# Patient Record
Sex: MALE | Race: WHITE | NOT HISPANIC OR LATINO | Employment: OTHER | URBAN - METROPOLITAN AREA
[De-identification: names, ages, dates, MRNs, and addresses within clinical notes are randomized per-mention and may not be internally consistent; named-entity substitution may affect disease eponyms.]

---

## 2017-11-05 ENCOUNTER — APPOINTMENT (OUTPATIENT)
Dept: RADIOLOGY | Facility: CLINIC | Age: 77
End: 2017-11-05
Payer: MEDICARE

## 2017-11-05 ENCOUNTER — OFFICE VISIT (OUTPATIENT)
Dept: URGENT CARE | Facility: CLINIC | Age: 77
End: 2017-11-05
Payer: MEDICARE

## 2017-11-05 DIAGNOSIS — M79.641 PAIN OF RIGHT HAND: ICD-10-CM

## 2017-11-05 PROCEDURE — 73130 X-RAY EXAM OF HAND: CPT

## 2017-11-05 PROCEDURE — 99202 OFFICE O/P NEW SF 15 MIN: CPT

## 2017-11-05 PROCEDURE — G0463 HOSPITAL OUTPT CLINIC VISIT: HCPCS

## 2017-11-06 ENCOUNTER — GENERIC CONVERSION - ENCOUNTER (OUTPATIENT)
Dept: OTHER | Facility: OTHER | Age: 77
End: 2017-11-06

## 2017-11-06 NOTE — PROGRESS NOTES
Assessment  1  Hand pain, right (319 5) (P76 323)    Plan  Hand pain, right    · Ibuprofen 600 MG Oral Tablet; TAKE 1 TABLET 3 TIMES DAILY AS NEEDED   · Ketorolac Tromethamine 60 MG/2ML Intramuscular Solution   · * XR HAND 3+ VIEW RIGHT; Status:Active - Retrospective By Protocol Authorization; Requested for:05Nov2017;    · * XR HAND 3+ VIEW RIGHT; Status:Canceled - Retrospective By Protocol Authorization; Discussion/Summary  Discussion Summary: An x-ray of Your right hand and wrist was performed and shows no signs of acute fracture  radiology will over-read this x-ray if any discrepancies noted you will be contacted  Wear wrist splint as needed  Ice to area as needed  Use Motrin as directed 1st dose is at least 6 hours from injection  You received Toradol injection in the office today  follow up with her family doctor if symptoms persist or worsen over the next 4-5 days  Chief Complaint  1  Hand Problem  Chief Complaint Free Text Note Form: Pain and swelling right hand started yesterday; denies injury      History of Present Illness  HPI: With wife with complaints of swelling and pain in his right hand and wrist area  He states he 1st noticed it yesterday morning upon awakening  He states the pain is minimal at rest but can get to attend a 10 when he tries to flex his hand  He is right-hand dominant  He denies any injury  He states the day before though he was doing a lot of repetitive activity but nothing out of the ordinary  He denies any associated numbness and tingling  He has tried ice to the area with no significant relief of symptoms  Past medical history is otherwise significant for hyperlipidemia hypertension and borderline diabetes  Hospital Based Practices Required Assessment:   Pain Assessment   the patient states they have pain  The pain is located in the hand   The patient describes the pain as aching  (on a scale of 0 to 10, the patient rates the pain at 5 )   Abuse And Domestic Violence Screen    Yes, the patient is safe at home  -- The patient states no one is hurting them  Depression And Suicide Screen  No, the patient has not had thoughts of hurting themself  No, the patient has not felt depressed in the past 7 days  Review of Systems  Focused-Male:   Constitutional: no fever or chills, feels well, no tiredness, no recent weight loss or weight gain  ENT: no complaints of earache, no loss of hearing, no nosebleeds or nasal discharge, no sore throat or hoarseness  Respiratory: no complaints of shortness of breath, no wheezing or cough, no dyspnea on exertion, no orthopnea or PND  Musculoskeletal: arthralgias,-- joint swelling,-- joint stiffness-- and-- Right hand and wrist    Integumentary: no complaints of skin rash or lesion, no itching or dry skin, no skin wounds  Active Problems  1  Borderline hyperlipidemia (272 4) (E78 5)   2  Borderline hypertension (796 2) (R03 0)    Past Medical History  1  History of heart valve abnormality (V12 59) (Z86 79)  Active Problems And Past Medical History Reviewed: The active problems and past medical history were reviewed and updated today  Family History  Family History Reviewed: The family history was reviewed and updated today  Social History   · Never a smoker  Social History Reviewed: The social history was reviewed and updated today  Surgical History  Surgical History Reviewed: The surgical history was reviewed and updated today  Current Meds   1  Aspirin 81 MG CAPS; Therapy: (Recorded:05Nov2017) to Recorded   2  Lisinopril TABS; Therapy: (Recorded:05Nov2017) to Recorded   3  Simvastatin TABS; Therapy: (20 157 176) to Recorded  Medication List Reviewed: The medication list was reviewed and updated today  Allergies  1   No Known Drug Allergies    Vitals  Signs   Recorded: [id number] 01:04PM   Temperature: 97 1 F  Heart Rate: 88  Respiration: 20  Systolic: 936  Diastolic: 90  Height: 5 ft 8 in  Weight: 231 lb   BMI Calculated: 35 12  BSA Calculated: 2 17  O2 Saturation: 99    Physical Exam    Constitutional   General appearance: No acute distress, well appearing and well nourished  Eyes   Conjunctiva and lids: No swelling, erythema, or discharge  Pupils and irises: Equal, round and reactive to light  Ears, Nose, Mouth, and Throat   External inspection of ears and nose: Normal     Otoscopic examination: Tympanic membrance translucent with normal light reflex  Canals patent without erythema  Nasal mucosa, septum, and turbinates: Normal without edema or erythema  Pulmonary   Respiratory effort: No increased work of breathing or signs of respiratory distress  Auscultation of lungs: Clear to auscultation  Cardiovascular   Palpation of heart: Normal PMI, no thrills  Auscultation of heart: Abnormal  -- Grade 2 systolic ejection murmur  Examination of extremities for edema and/or varicosities: Normal     Musculoskeletal   Gait and station: Normal     Inspection/palpation of joints, bones, and muscles: Abnormal  -- Diffuse swelling noted over the dorsal aspect of right hand mainly over the 1st and 2nd metacarpal area  Opposition is limited decreased range of motion with wrist flexion  Good radial pulses noted  Good capillary refill  No signs of any erythema or ecchymosis  Tenderness noted over the dorsal hand and wrist       Results/Data  Diagnostic Studies Reviewed: I personally reviewed the films/images/results in the office today  My interpretation follows  X-ray Review No acute fracture  DJD noted        Signatures   Electronically signed by : Lester Frazier DO; Nov 5 2017  1:48PM EST                       (Author)

## 2017-12-18 ENCOUNTER — ANESTHESIA EVENT (OUTPATIENT)
Dept: PERIOP | Facility: HOSPITAL | Age: 77
End: 2017-12-18
Payer: MEDICARE

## 2017-12-18 RX ORDER — SODIUM CHLORIDE 9 MG/ML
125 INJECTION, SOLUTION INTRAVENOUS CONTINUOUS
Status: CANCELLED | OUTPATIENT
Start: 2018-01-17

## 2017-12-19 ENCOUNTER — GENERIC CONVERSION - ENCOUNTER (OUTPATIENT)
Dept: OTHER | Facility: OTHER | Age: 77
End: 2017-12-19

## 2017-12-19 ENCOUNTER — APPOINTMENT (OUTPATIENT)
Dept: PREADMISSION TESTING | Facility: HOSPITAL | Age: 77
End: 2017-12-19
Payer: MEDICARE

## 2017-12-19 ENCOUNTER — TRANSCRIBE ORDERS (OUTPATIENT)
Dept: ADMINISTRATIVE | Facility: HOSPITAL | Age: 77
End: 2017-12-19

## 2017-12-19 ENCOUNTER — APPOINTMENT (OUTPATIENT)
Dept: LAB | Facility: HOSPITAL | Age: 77
End: 2017-12-19
Attending: ORTHOPAEDIC SURGERY
Payer: MEDICARE

## 2017-12-19 ENCOUNTER — HOSPITAL ENCOUNTER (OUTPATIENT)
Dept: NON INVASIVE DIAGNOSTICS | Facility: HOSPITAL | Age: 77
Discharge: HOME | End: 2017-12-19
Attending: ORTHOPAEDIC SURGERY
Payer: MEDICARE

## 2017-12-19 ENCOUNTER — HOSPITAL ENCOUNTER (OUTPATIENT)
Dept: RADIOLOGY | Facility: HOSPITAL | Age: 77
Discharge: HOME | End: 2017-12-19
Attending: ORTHOPAEDIC SURGERY
Payer: MEDICARE

## 2017-12-19 VITALS
WEIGHT: 175.2 LBS | HEIGHT: 63 IN | DIASTOLIC BLOOD PRESSURE: 100 MMHG | BODY MASS INDEX: 31.04 KG/M2 | TEMPERATURE: 96 F | RESPIRATION RATE: 18 BRPM | SYSTOLIC BLOOD PRESSURE: 150 MMHG | HEART RATE: 52 BPM

## 2017-12-19 DIAGNOSIS — Z01.818 PREOP EXAMINATION: Primary | ICD-10-CM

## 2017-12-19 DIAGNOSIS — M17.12 OSTEOARTHRITIS OF LEFT KNEE, UNSPECIFIED OSTEOARTHRITIS TYPE: ICD-10-CM

## 2017-12-19 DIAGNOSIS — Z01.818 PREOP EXAMINATION: ICD-10-CM

## 2017-12-19 LAB
ALBUMIN SERPL BCP-MCNC: 3.9 G/DL (ref 3.5–5)
ALP SERPL-CCNC: 55 U/L (ref 46–116)
ALT SERPL W P-5'-P-CCNC: 40 U/L (ref 12–78)
ANION GAP SERPL CALCULATED.3IONS-SCNC: 8 MMOL/L (ref 4–13)
AST SERPL W P-5'-P-CCNC: 25 U/L (ref 5–45)
ATRIAL RATE: 48 BPM
BASOPHILS # BLD AUTO: 0.01 THOUSANDS/ΜL (ref 0–0.1)
BASOPHILS NFR BLD AUTO: 0 % (ref 0–1)
BILIRUB SERPL-MCNC: 0.52 MG/DL (ref 0.2–1)
BILIRUB UR QL STRIP: NEGATIVE
BUN SERPL-MCNC: 19 MG/DL (ref 5–25)
CALCIUM SERPL-MCNC: 9.3 MG/DL (ref 8.3–10.1)
CHLORIDE SERPL-SCNC: 106 MMOL/L (ref 100–108)
CLARITY UR: CLEAR
CO2 SERPL-SCNC: 26 MMOL/L (ref 21–32)
COLOR UR: YELLOW
CREAT SERPL-MCNC: 1.05 MG/DL (ref 0.6–1.3)
EOSINOPHIL # BLD AUTO: 0.06 THOUSAND/ΜL (ref 0–0.61)
EOSINOPHIL NFR BLD AUTO: 1 % (ref 0–6)
ERYTHROCYTE [DISTWIDTH] IN BLOOD BY AUTOMATED COUNT: 13.4 % (ref 11.6–15.1)
GFR SERPL CREATININE-BSD FRML MDRD: 69 ML/MIN/1.73SQ M
GLUCOSE P FAST SERPL-MCNC: 121 MG/DL (ref 65–99)
GLUCOSE UR STRIP-MCNC: ABNORMAL MG/DL
HCT VFR BLD AUTO: 41.5 % (ref 36.5–49.3)
HGB BLD-MCNC: 14.3 G/DL (ref 12–17)
HGB UR QL STRIP.AUTO: NEGATIVE
INR PPP: 1 (ref 0.86–1.16)
KETONES UR STRIP-MCNC: NEGATIVE MG/DL
LEUKOCYTE ESTERASE UR QL STRIP: NEGATIVE
LYMPHOCYTES # BLD AUTO: 1.84 THOUSANDS/ΜL (ref 0.6–4.47)
LYMPHOCYTES NFR BLD AUTO: 36 % (ref 14–44)
MCH RBC QN AUTO: 29.9 PG (ref 26.8–34.3)
MCHC RBC AUTO-ENTMCNC: 34.5 G/DL (ref 31.4–37.4)
MCV RBC AUTO: 87 FL (ref 82–98)
MONOCYTES # BLD AUTO: 0.5 THOUSAND/ΜL (ref 0.17–1.22)
MONOCYTES NFR BLD AUTO: 10 % (ref 4–12)
NEUTROPHILS # BLD AUTO: 2.73 THOUSANDS/ΜL (ref 1.85–7.62)
NEUTS SEG NFR BLD AUTO: 53 % (ref 43–75)
NITRITE UR QL STRIP: NEGATIVE
NRBC BLD AUTO-RTO: 0 /100 WBCS
P AXIS: 53 DEGREES
PH UR STRIP.AUTO: 5.5 [PH] (ref 4.5–8)
PLATELET # BLD AUTO: 159 THOUSANDS/UL (ref 149–390)
PMV BLD AUTO: 11.1 FL (ref 8.9–12.7)
POTASSIUM SERPL-SCNC: 4.3 MMOL/L (ref 3.5–5.3)
PR INTERVAL: 200 MS
PROT SERPL-MCNC: 7.4 G/DL (ref 6.4–8.2)
PROT UR STRIP-MCNC: NEGATIVE MG/DL
PROTHROMBIN TIME: 13.2 SECONDS (ref 12.1–14.4)
QRS AXIS: -12 DEGREES
QRSD INTERVAL: 104 MS
QT INTERVAL: 490 MS
QTC INTERVAL: 437 MS
RBC # BLD AUTO: 4.78 MILLION/UL (ref 3.88–5.62)
SODIUM SERPL-SCNC: 140 MMOL/L (ref 136–145)
SP GR UR STRIP.AUTO: >=1.03 (ref 1–1.03)
T WAVE AXIS: 154 DEGREES
UROBILINOGEN UR QL STRIP.AUTO: 0.2 E.U./DL
VENTRICULAR RATE: 48 BPM
WBC # BLD AUTO: 5.14 THOUSAND/UL (ref 4.31–10.16)

## 2017-12-19 PROCEDURE — 36415 COLL VENOUS BLD VENIPUNCTURE: CPT

## 2017-12-19 PROCEDURE — 80053 COMPREHEN METABOLIC PANEL: CPT

## 2017-12-19 PROCEDURE — 85025 COMPLETE CBC W/AUTO DIFF WBC: CPT

## 2017-12-19 PROCEDURE — 81003 URINALYSIS AUTO W/O SCOPE: CPT | Performed by: ORTHOPAEDIC SURGERY

## 2017-12-19 PROCEDURE — 93005 ELECTROCARDIOGRAM TRACING: CPT

## 2017-12-19 PROCEDURE — 71020 HB CHEST X-RAY 2VW FRONTAL&LATL: CPT

## 2017-12-19 PROCEDURE — 85610 PROTHROMBIN TIME: CPT

## 2017-12-19 RX ORDER — IBUPROFEN 200 MG
200-800 TABLET ORAL EVERY 6 HOURS PRN
COMMUNITY
End: 2018-01-18 | Stop reason: HOSPADM

## 2017-12-19 RX ORDER — AMOXICILLIN 500 MG
3 CAPSULE ORAL DAILY
COMMUNITY
End: 2018-01-18 | Stop reason: HOSPADM

## 2017-12-19 RX ORDER — SIMVASTATIN 40 MG
40 TABLET ORAL
COMMUNITY
End: 2019-09-16 | Stop reason: SDUPTHER

## 2017-12-19 RX ORDER — MULTIVITAMIN
1 CAPSULE ORAL DAILY
COMMUNITY

## 2017-12-19 RX ORDER — LEVOTHYROXINE SODIUM 0.03 MG/1
25 TABLET ORAL DAILY
COMMUNITY
End: 2019-09-16 | Stop reason: SDUPTHER

## 2017-12-19 RX ORDER — LISINOPRIL 10 MG/1
10 TABLET ORAL DAILY
COMMUNITY
End: 2019-09-16 | Stop reason: SDUPTHER

## 2017-12-19 NOTE — PRE-PROCEDURE INSTRUCTIONS
Pre-Surgery Instructions:   Medication Instructions    aspirin (ECOTRIN) 325 mg EC tablet Patient was instructed to contact Physician for medication instruction   ibuprofen (MOTRIN) 200 mg tablet Patient was instructed by Physician and understands   levothyroxine 25 mcg tablet Instructed patient per Anesthesia Guidelines   lisinopril (ZESTRIL) 10 mg tablet Instructed patient per Anesthesia Guidelines   Magnesium 400 MG CAPS Instructed patient per Anesthesia Guidelines   Multiple Vitamin (MULTIVITAMIN) capsule Patient was instructed by Physician and understands   Omega-3 Fatty Acids (FISH OIL) 1200 MG CAPS Patient was instructed by Physician and understands   simvastatin (ZOCOR) 40 mg tablet Instructed patient per Anesthesia Guidelines  Instructed to take lisinopril and levothyroxine am of surgery with sip susan sebastian per anesthesia DR Pastor Cazares

## 2017-12-19 NOTE — ANESTHESIA PREPROCEDURE EVALUATION
Review of Systems/Medical History  Patient summary reviewed  Chart reviewed  No history of anesthetic complications     Cardiovascular  Hyperlipidemia, Hypertension controlled, Valve replacement (AVR 2007), No past MI , CAD, , History of CABG (4 vv), No angina ,    Pulmonary  Negative pulmonary ROS ,        GI/Hepatic  Negative GI/hepatic ROS          Negative  ROS        Endo/Other  History of thyroid disease , hypothyroidism, Arthritis     GYN       Hematology  Negative hematology ROS      Musculoskeletal  Obesity ,        Neurology  Negative neurology ROS     Comment: H/o lumbar fusion Psychology   Negative psychology ROS            Physical Exam    Airway    Mallampati score: II  TM Distance: >3 FB  Neck ROM: full     Dental   upper dentures,     Cardiovascular  Rhythm: regular, Rate: normal,     Pulmonary  Pulmonary exam normal Breath sounds clear to auscultation,     Other Findings        Anesthesia Plan  ASA Score- 2       Anesthesia Type- spinal with ASA Monitors  Additional Monitors:   Airway Plan:           Induction- intravenous        Informed Consent-

## 2017-12-19 NOTE — ANESTHESIA PREPROCEDURE EVALUATION
Review of Systems/Medical History  Patient summary reviewed  Chart reviewed  No history of anesthetic complications     Cardiovascular  Hyperlipidemia, Hypertension controlled, Valve replacement (AVR 2007), No past MI , CAD, , History of CABG (4 vv), No angina ,    Pulmonary  Negative pulmonary ROS ,        GI/Hepatic  Negative GI/hepatic ROS          Negative  ROS        Endo/Other  History of thyroid disease , hypothyroidism, Arthritis     GYN       Hematology  Negative hematology ROS      Musculoskeletal  Obesity ,        Neurology  Negative neurology ROS      Psychology   Negative psychology ROS            Physical Exam    Airway    Mallampati score: II  TM Distance: >3 FB  Neck ROM: full     Dental   upper dentures,     Cardiovascular  Rhythm: regular, Rate: normal,     Pulmonary  Pulmonary exam normal Breath sounds clear to auscultation,     Other Findings        Anesthesia Plan  ASA Score- 3     Anesthesia Type- spinal with ASA Monitors  Additional Monitors:   Airway Plan:     Comment: Adductor canal block preop  Plan Factors-    Induction- intravenous  Postoperative Plan-     Informed Consent- Anesthetic plan and risks discussed with patient

## 2017-12-19 NOTE — PERIOPERATIVE NURSING NOTE
Tc to DR [facility] office to report elevated bp today 150/100 initially then 183/102 left arm  Pt checks bp at home   Pt to be seen by md 1/3/18

## 2018-01-11 NOTE — RESULT NOTES
Discussion/Summary   Discused with Patient's wife that Radiology sees severe OA of Right wrist and hand  To F/U as previously discussed  Verified Results  * XR HAND 3+ VIEW RIGHT [id number] 01:24PM Kimberly Lugo Order Number: [id number]     Test Name Result Flag Reference   XR HAND 3+ VW RIGHT (Report)     RIGHT HAND     INDICATION: Right hand pain  COMPARISON: None     VIEWS: 3     IMAGES: 3     For the purposes of institution wide universal language the following terms will apply: (thumb=1st digit/finger, index finger=2nd digit/finger, long finger=3rd digit/finger, ring=4th digit/finger and small finger=5th digit/finger)     FINDINGS:     There is no acute fracture or dislocation  There is severe radiocarpal joint osteoarthritis  There is also severe 1st carpometacarpal joint osteoarthritis  There is moderate 3rd metacarpophalangeal joint osteoarthritis  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

## 2018-01-17 ENCOUNTER — ANESTHESIA (OUTPATIENT)
Dept: PERIOP | Facility: HOSPITAL | Age: 78
End: 2018-01-17
Payer: MEDICARE

## 2018-01-17 ENCOUNTER — APPOINTMENT (OUTPATIENT)
Dept: RADIOLOGY | Facility: HOSPITAL | Age: 78
End: 2018-01-17
Payer: MEDICARE

## 2018-01-17 ENCOUNTER — HOSPITAL ENCOUNTER (OUTPATIENT)
Facility: HOSPITAL | Age: 78
Setting detail: OUTPATIENT SURGERY
Discharge: HOME | End: 2018-01-18
Attending: ORTHOPAEDIC SURGERY | Admitting: ORTHOPAEDIC SURGERY
Payer: MEDICARE

## 2018-01-17 DIAGNOSIS — M17.12 PRIMARY OSTEOARTHRITIS OF LEFT KNEE: Primary | ICD-10-CM

## 2018-01-17 LAB
ABO GROUP BLD: NORMAL
BLD GP AB SCN SERPL QL: NEGATIVE
GLUCOSE SERPL-MCNC: 194 MG/DL (ref 65–140)
RH BLD: POSITIVE
SPECIMEN EXPIRATION DATE: NORMAL

## 2018-01-17 PROCEDURE — 73560 X-RAY EXAM OF KNEE 1 OR 2: CPT

## 2018-01-17 PROCEDURE — C1776 JOINT DEVICE (IMPLANTABLE): HCPCS | Performed by: ORTHOPAEDIC SURGERY

## 2018-01-17 PROCEDURE — 86850 RBC ANTIBODY SCREEN: CPT | Performed by: ORTHOPAEDIC SURGERY

## 2018-01-17 PROCEDURE — 97163 PT EVAL HIGH COMPLEX 45 MIN: CPT

## 2018-01-17 PROCEDURE — G8978 MOBILITY CURRENT STATUS: HCPCS

## 2018-01-17 PROCEDURE — 86900 BLOOD TYPING SEROLOGIC ABO: CPT | Performed by: ORTHOPAEDIC SURGERY

## 2018-01-17 PROCEDURE — 94762 N-INVAS EAR/PLS OXIMTRY CONT: CPT

## 2018-01-17 PROCEDURE — G8979 MOBILITY GOAL STATUS: HCPCS

## 2018-01-17 PROCEDURE — 86901 BLOOD TYPING SEROLOGIC RH(D): CPT | Performed by: ORTHOPAEDIC SURGERY

## 2018-01-17 PROCEDURE — C1713 ANCHOR/SCREW BN/BN,TIS/BN: HCPCS | Performed by: ORTHOPAEDIC SURGERY

## 2018-01-17 PROCEDURE — 82948 REAGENT STRIP/BLOOD GLUCOSE: CPT

## 2018-01-17 DEVICE — ATTUNE KNEE SYSTEM TIBIAL INSERT ROTATING PLATFORM POSTERIOR STABILIZED 5 15MM AOX
Type: IMPLANTABLE DEVICE | Site: KNEE | Status: FUNCTIONAL
Brand: ATTUNE

## 2018-01-17 DEVICE — ATTUNE KNEE SYSTEM FEMORAL POSTERIOR STABILIZED SIZE 5 LEFT CEMENTED
Type: IMPLANTABLE DEVICE | Site: KNEE | Status: FUNCTIONAL
Brand: ATTUNE

## 2018-01-17 DEVICE — SMARTSET HIGH PERFORMANCE MV MEDIUM VISCOSITY BONE CEMENT 40G
Type: IMPLANTABLE DEVICE | Site: KNEE | Status: FUNCTIONAL
Brand: SMARTSET

## 2018-01-17 DEVICE — ATTUNE PATELLA MEDIALIZED ANATOMIC 35MM CEMENTED AOX
Type: IMPLANTABLE DEVICE | Site: PATELLA | Status: FUNCTIONAL
Brand: ATTUNE

## 2018-01-17 DEVICE — ATTUNE KNEE SYSTEM TIBIAL BASE ROTATING PLATFORM SIZE 5 CEMENTED
Type: IMPLANTABLE DEVICE | Site: KNEE | Status: FUNCTIONAL
Brand: ATTUNE

## 2018-01-17 RX ORDER — METOCLOPRAMIDE HYDROCHLORIDE 5 MG/ML
5 INJECTION INTRAMUSCULAR; INTRAVENOUS EVERY 6 HOURS PRN
Status: ACTIVE | OUTPATIENT
Start: 2018-01-17 | End: 2018-01-18

## 2018-01-17 RX ORDER — EPHEDRINE SULFATE 50 MG/ML
25 INJECTION, SOLUTION INTRAVENOUS ONCE
Status: COMPLETED | OUTPATIENT
Start: 2018-01-17 | End: 2018-01-17

## 2018-01-17 RX ORDER — MAGNESIUM HYDROXIDE 1200 MG/15ML
LIQUID ORAL AS NEEDED
Status: DISCONTINUED | OUTPATIENT
Start: 2018-01-17 | End: 2018-01-17 | Stop reason: HOSPADM

## 2018-01-17 RX ORDER — LISINOPRIL 10 MG/1
10 TABLET ORAL DAILY
Status: DISCONTINUED | OUTPATIENT
Start: 2018-01-18 | End: 2018-01-17

## 2018-01-17 RX ORDER — BUPIVACAINE HYDROCHLORIDE 7.5 MG/ML
INJECTION, SOLUTION INTRASPINAL AS NEEDED
Status: DISCONTINUED | OUTPATIENT
Start: 2018-01-17 | End: 2018-01-17 | Stop reason: SURG

## 2018-01-17 RX ORDER — DEXAMETHASONE SODIUM PHOSPHATE 4 MG/ML
4 INJECTION, SOLUTION INTRA-ARTICULAR; INTRALESIONAL; INTRAMUSCULAR; INTRAVENOUS; SOFT TISSUE ONCE AS NEEDED
Status: ACTIVE | OUTPATIENT
Start: 2018-01-17 | End: 2018-01-18

## 2018-01-17 RX ORDER — ASPIRIN 325 MG
325 TABLET ORAL 2 TIMES DAILY
Status: DISCONTINUED | OUTPATIENT
Start: 2018-01-17 | End: 2018-01-18 | Stop reason: HOSPADM

## 2018-01-17 RX ORDER — PRAVASTATIN SODIUM 20 MG
10 TABLET ORAL
Status: DISCONTINUED | OUTPATIENT
Start: 2018-01-17 | End: 2018-01-18 | Stop reason: HOSPADM

## 2018-01-17 RX ORDER — ROPIVACAINE HYDROCHLORIDE 5 MG/ML
INJECTION, SOLUTION EPIDURAL; INFILTRATION; PERINEURAL AS NEEDED
Status: DISCONTINUED | OUTPATIENT
Start: 2018-01-17 | End: 2018-01-17 | Stop reason: SURG

## 2018-01-17 RX ORDER — ACETAMINOPHEN 325 MG/1
650 TABLET ORAL EVERY 6 HOURS PRN
Status: DISCONTINUED | OUTPATIENT
Start: 2018-01-17 | End: 2018-01-18 | Stop reason: HOSPADM

## 2018-01-17 RX ORDER — DOCUSATE SODIUM 100 MG/1
100 CAPSULE, LIQUID FILLED ORAL 2 TIMES DAILY
Status: DISCONTINUED | OUTPATIENT
Start: 2018-01-17 | End: 2018-01-18 | Stop reason: HOSPADM

## 2018-01-17 RX ORDER — FERROUS SULFATE 325(65) MG
325 TABLET ORAL
Status: DISCONTINUED | OUTPATIENT
Start: 2018-01-18 | End: 2018-01-18 | Stop reason: HOSPADM

## 2018-01-17 RX ORDER — LEVOTHYROXINE SODIUM 0.03 MG/1
25 TABLET ORAL DAILY
Status: DISCONTINUED | OUTPATIENT
Start: 2018-01-18 | End: 2018-01-18 | Stop reason: HOSPADM

## 2018-01-17 RX ORDER — FENTANYL CITRATE 50 UG/ML
INJECTION, SOLUTION INTRAMUSCULAR; INTRAVENOUS AS NEEDED
Status: DISCONTINUED | OUTPATIENT
Start: 2018-01-17 | End: 2018-01-17 | Stop reason: SURG

## 2018-01-17 RX ORDER — SENNOSIDES 8.6 MG
1 TABLET ORAL DAILY
Status: DISCONTINUED | OUTPATIENT
Start: 2018-01-18 | End: 2018-01-18 | Stop reason: HOSPADM

## 2018-01-17 RX ORDER — LISINOPRIL 10 MG/1
10 TABLET ORAL DAILY
Status: DISCONTINUED | OUTPATIENT
Start: 2018-01-18 | End: 2018-01-18 | Stop reason: HOSPADM

## 2018-01-17 RX ORDER — ONDANSETRON 2 MG/ML
4 INJECTION INTRAMUSCULAR; INTRAVENOUS ONCE AS NEEDED
Status: DISCONTINUED | OUTPATIENT
Start: 2018-01-17 | End: 2018-01-17 | Stop reason: HOSPADM

## 2018-01-17 RX ORDER — ONDANSETRON 2 MG/ML
4 INJECTION INTRAMUSCULAR; INTRAVENOUS EVERY 4 HOURS PRN
Status: ACTIVE | OUTPATIENT
Start: 2018-01-17 | End: 2018-01-18

## 2018-01-17 RX ORDER — ONDANSETRON 2 MG/ML
INJECTION INTRAMUSCULAR; INTRAVENOUS AS NEEDED
Status: DISCONTINUED | OUTPATIENT
Start: 2018-01-17 | End: 2018-01-17 | Stop reason: SURG

## 2018-01-17 RX ORDER — ONDANSETRON 2 MG/ML
4 INJECTION INTRAMUSCULAR; INTRAVENOUS EVERY 8 HOURS PRN
Status: DISCONTINUED | OUTPATIENT
Start: 2018-01-17 | End: 2018-01-18 | Stop reason: HOSPADM

## 2018-01-17 RX ORDER — SODIUM CHLORIDE, SODIUM LACTATE, POTASSIUM CHLORIDE, CALCIUM CHLORIDE 600; 310; 30; 20 MG/100ML; MG/100ML; MG/100ML; MG/100ML
100 INJECTION, SOLUTION INTRAVENOUS CONTINUOUS
Status: DISCONTINUED | OUTPATIENT
Start: 2018-01-17 | End: 2018-01-18 | Stop reason: HOSPADM

## 2018-01-17 RX ORDER — FENTANYL CITRATE/PF 50 MCG/ML
50 SYRINGE (ML) INJECTION
Status: DISCONTINUED | OUTPATIENT
Start: 2018-01-17 | End: 2018-01-17 | Stop reason: HOSPADM

## 2018-01-17 RX ORDER — SODIUM CHLORIDE 9 MG/ML
125 INJECTION, SOLUTION INTRAVENOUS CONTINUOUS
Status: DISCONTINUED | OUTPATIENT
Start: 2018-01-17 | End: 2018-01-18 | Stop reason: HOSPADM

## 2018-01-17 RX ORDER — SODIUM CHLORIDE 9 MG/ML
100 INJECTION, SOLUTION INTRAVENOUS CONTINUOUS
Status: DISCONTINUED | OUTPATIENT
Start: 2018-01-17 | End: 2018-01-18 | Stop reason: HOSPADM

## 2018-01-17 RX ORDER — MEPERIDINE HYDROCHLORIDE 50 MG/ML
12.5 INJECTION INTRAMUSCULAR; INTRAVENOUS; SUBCUTANEOUS ONCE AS NEEDED
Status: DISCONTINUED | OUTPATIENT
Start: 2018-01-17 | End: 2018-01-17 | Stop reason: HOSPADM

## 2018-01-17 RX ORDER — MORPHINE SULFATE 0.5 MG/ML
INJECTION, SOLUTION EPIDURAL; INTRATHECAL; INTRAVENOUS AS NEEDED
Status: DISCONTINUED | OUTPATIENT
Start: 2018-01-17 | End: 2018-01-17 | Stop reason: SURG

## 2018-01-17 RX ORDER — EPHEDRINE SULFATE 50 MG/ML
INJECTION, SOLUTION INTRAVENOUS AS NEEDED
Status: DISCONTINUED | OUTPATIENT
Start: 2018-01-17 | End: 2018-01-17 | Stop reason: SURG

## 2018-01-17 RX ORDER — MIDAZOLAM HYDROCHLORIDE 1 MG/ML
INJECTION INTRAMUSCULAR; INTRAVENOUS AS NEEDED
Status: DISCONTINUED | OUTPATIENT
Start: 2018-01-17 | End: 2018-01-17 | Stop reason: SURG

## 2018-01-17 RX ORDER — DIPHENHYDRAMINE HYDROCHLORIDE 50 MG/ML
25 INJECTION INTRAMUSCULAR; INTRAVENOUS EVERY 6 HOURS PRN
Status: ACTIVE | OUTPATIENT
Start: 2018-01-17 | End: 2018-01-18

## 2018-01-17 RX ORDER — SODIUM CHLORIDE, SODIUM LACTATE, POTASSIUM CHLORIDE, CALCIUM CHLORIDE 600; 310; 30; 20 MG/100ML; MG/100ML; MG/100ML; MG/100ML
INJECTION, SOLUTION INTRAVENOUS CONTINUOUS PRN
Status: DISCONTINUED | OUTPATIENT
Start: 2018-01-17 | End: 2018-01-17 | Stop reason: SURG

## 2018-01-17 RX ORDER — PROPOFOL 10 MG/ML
INJECTION, EMULSION INTRAVENOUS CONTINUOUS PRN
Status: DISCONTINUED | OUTPATIENT
Start: 2018-01-17 | End: 2018-01-17 | Stop reason: SURG

## 2018-01-17 RX ORDER — KETOROLAC TROMETHAMINE 30 MG/ML
15 INJECTION, SOLUTION INTRAMUSCULAR; INTRAVENOUS EVERY 6 HOURS PRN
Status: DISCONTINUED | OUTPATIENT
Start: 2018-01-17 | End: 2018-01-18 | Stop reason: HOSPADM

## 2018-01-17 RX ORDER — CHLORAL HYDRATE 500 MG
1000 CAPSULE ORAL 2 TIMES DAILY
Status: DISCONTINUED | OUTPATIENT
Start: 2018-01-18 | End: 2018-01-18 | Stop reason: HOSPADM

## 2018-01-17 RX ORDER — HYDROCODONE BITARTRATE AND ACETAMINOPHEN 5; 325 MG/1; MG/1
1 TABLET ORAL EVERY 6 HOURS PRN
Status: DISCONTINUED | OUTPATIENT
Start: 2018-01-18 | End: 2018-01-18 | Stop reason: HOSPADM

## 2018-01-17 RX ADMIN — EPHEDRINE SULFATE 25 MG: 50 INJECTION INTRAMUSCULAR; INTRAVENOUS; SUBCUTANEOUS at 14:31

## 2018-01-17 RX ADMIN — EPHEDRINE SULFATE 10 MG: 50 INJECTION, SOLUTION INTRAMUSCULAR; INTRAVENOUS; SUBCUTANEOUS at 12:40

## 2018-01-17 RX ADMIN — DEXAMETHASONE SODIUM PHOSPHATE 4 MG: 10 INJECTION INTRAMUSCULAR; INTRAVENOUS at 11:39

## 2018-01-17 RX ADMIN — INSULIN LISPRO 2 UNITS: 100 INJECTION, SOLUTION INTRAVENOUS; SUBCUTANEOUS at 21:52

## 2018-01-17 RX ADMIN — PRAVASTATIN SODIUM 10 MG: 20 TABLET ORAL at 19:52

## 2018-01-17 RX ADMIN — CEFAZOLIN SODIUM 1000 MG: 1 SOLUTION INTRAVENOUS at 19:52

## 2018-01-17 RX ADMIN — FENTANYL CITRATE 150 MCG: 50 INJECTION INTRAMUSCULAR; INTRAVENOUS at 10:51

## 2018-01-17 RX ADMIN — PROPOFOL 60 MCG/KG/MIN: 10 INJECTION, EMULSION INTRAVENOUS at 11:27

## 2018-01-17 RX ADMIN — MIDAZOLAM HYDROCHLORIDE 2 MG: 1 INJECTION, SOLUTION INTRAMUSCULAR; INTRAVENOUS at 10:51

## 2018-01-17 RX ADMIN — SODIUM CHLORIDE, SODIUM LACTATE, POTASSIUM CHLORIDE, AND CALCIUM CHLORIDE 100 ML/HR: .6; .31; .03; .02 INJECTION, SOLUTION INTRAVENOUS at 16:31

## 2018-01-17 RX ADMIN — EPHEDRINE SULFATE 15 MG: 50 INJECTION, SOLUTION INTRAMUSCULAR; INTRAVENOUS; SUBCUTANEOUS at 11:33

## 2018-01-17 RX ADMIN — DOCUSATE SODIUM 100 MG: 100 CAPSULE, LIQUID FILLED ORAL at 18:25

## 2018-01-17 RX ADMIN — ROPIVACAINE HYDROCHLORIDE 30 ML: 5 INJECTION, SOLUTION EPIDURAL; INFILTRATION; PERINEURAL at 10:59

## 2018-01-17 RX ADMIN — SODIUM CHLORIDE, SODIUM LACTATE, POTASSIUM CHLORIDE, AND CALCIUM CHLORIDE 100 ML/HR: .6; .31; .03; .02 INJECTION, SOLUTION INTRAVENOUS at 14:02

## 2018-01-17 RX ADMIN — ONDANSETRON HYDROCHLORIDE 4 MG: 2 INJECTION, SOLUTION INTRAVENOUS at 11:39

## 2018-01-17 RX ADMIN — MORPHINE SULFATE 0.15 MG: 0.5 INJECTION, SOLUTION EPIDURAL; INTRATHECAL; INTRAVENOUS at 11:24

## 2018-01-17 RX ADMIN — SODIUM CHLORIDE, SODIUM LACTATE, POTASSIUM CHLORIDE, AND CALCIUM CHLORIDE 100 ML/HR: .6; .31; .03; .02 INJECTION, SOLUTION INTRAVENOUS at 15:22

## 2018-01-17 RX ADMIN — BUPIVACAINE HYDROCHLORIDE IN DEXTROSE 1.6 ML: 7.5 INJECTION, SOLUTION SUBARACHNOID at 11:24

## 2018-01-17 RX ADMIN — SODIUM CHLORIDE 125 ML/HR: 0.9 INJECTION, SOLUTION INTRAVENOUS at 09:44

## 2018-01-17 RX ADMIN — ASPIRIN 325 MG: 325 TABLET ORAL at 18:25

## 2018-01-17 RX ADMIN — SODIUM CHLORIDE: 0.9 INJECTION, SOLUTION INTRAVENOUS at 11:46

## 2018-01-17 RX ADMIN — PHENYLEPHRINE HYDROCHLORIDE 10 MCG/MIN: 10 INJECTION INTRAVENOUS at 11:55

## 2018-01-17 RX ADMIN — SODIUM CHLORIDE, SODIUM LACTATE, POTASSIUM CHLORIDE, AND CALCIUM CHLORIDE: .6; .31; .03; .02 INJECTION, SOLUTION INTRAVENOUS at 12:41

## 2018-01-17 RX ADMIN — EPHEDRINE SULFATE 15 MG: 50 INJECTION, SOLUTION INTRAMUSCULAR; INTRAVENOUS; SUBCUTANEOUS at 11:38

## 2018-01-17 RX ADMIN — CEFAZOLIN SODIUM 2000 MG: 2 SOLUTION INTRAVENOUS at 11:27

## 2018-01-17 NOTE — ANESTHESIA PROCEDURE NOTES
Peripheral Block    Patient location during procedure: holding area  Start time: 1/17/2018 10:51 AM  End time: 1/17/2018 10:59 AM  Reason for block: procedure for pain, at surgeon's request and post-op pain management  Staffing  Anesthesiologist: Dory Moss  Performed: anesthesiologist   Preanesthetic Checklist  Completed: patient identified, site marked, surgical consent, pre-op evaluation, timeout performed, IV checked, risks and benefits discussed and monitors and equipment checked  Peripheral Block  Patient position: supine  Prep: ChloraPrep  Patient monitoring: continuous pulse ox and frequent blood pressure checks  Block type: adductor canal block  Laterality: left  Injection technique: single-shot  Procedures: ultrasound guided  Local infiltration: ropivacaine  Infiltration strength: 0 5 %  Dose: 30 mL  Needle  Needle type: Stimuplex   Needle gauge: 27 G  Needle length: 5 cm  Needle localization: anatomical landmarks and ultrasound guidance  Test dose: negative  Assessment  Injection assessment: incremental injection, local visualized surrounding nerve on ultrasound and negative aspiration for heme  Paresthesia pain: none  Heart rate change: no  Slow fractionated injection: yes  Post-procedure:  site cleaned  patient tolerated the procedure well with no immediate complications

## 2018-01-17 NOTE — PLAN OF CARE
Problem: PHYSICAL THERAPY ADULT  Goal: Performs mobility at highest level of function for planned discharge setting  See evaluation for individualized goals  Treatment/Interventions: Functional transfer training, LE strengthening/ROM, Elevations, Therapeutic exercise, Endurance training, Patient/family training, Bed mobility, Gait training, Spoke to nursing  Equipment Recommended: Júnior Flowers       See flowsheet documentation for full assessment, interventions and recommendations  Outcome: Progressing  Prognosis: Good  Problem List: Decreased strength, Decreased endurance, Impaired balance, Decreased mobility, Decreased range of motion  Assessment: Pt  78 y  o male s/p L TKR 2* to severe DJD  Pt referred to PT for mobility assessment & D/C planning  PTA, pt reports being I w/o AD  On eval, pt demonstrate dec mobility, balance, endurance & amb 2* to BLE weakness & numbness  Pt require maxAx2 for most functional mobility + cues for techniques  Gait deviations as above, unsteady w/ difficulty advancing BLE + BLE buckling but able to ambulate from stretcher to chair w/ inc BUE support & maxAx2  Will continue skilled PT to improve function & safety  Pt plans to return home at D/C  At this time, will anticipate good progress in PT for safe D/C to home  Feel once BLE numbness resolves pt will progress quickly in PT  If home, will recommend HHPT, RW & family support at D/C  Pt will need to achieve PT goals prior to D/C for safe return to home  CM to follow  Pt tolerated OOB in chair at end of session w/o issues  Call bell in reach  Nsg staff to continue to mobilized pt (OOB in chair for all meals & ambulate in room/unit) as tolerated to prevent further decline in function  Nsg notified  Barriers to Discharge: None     Recommendation: Home PT, Home with family support     PT - OK to Discharge: No (pt to achieve PT goals prior to D/C home)    See flowsheet documentation for full assessment

## 2018-01-17 NOTE — ANESTHESIA POSTPROCEDURE EVALUATION
Post-Op Assessment Note      CV Status:  Stable    Mental Status:  Alert and awake    Hydration Status:  Euvolemic    PONV Controlled:  Controlled    Airway Patency:  Patent    Post Op Vitals Reviewed: Yes          Staff: Anesthesiologist           BP (!) 83/54 (01/17/18 1433)    Temp     Pulse 64 (01/17/18 1433)   Resp 18 (01/17/18 1433)    SpO2 95 % (01/17/18 1433)

## 2018-01-17 NOTE — PHYSICAL THERAPY NOTE
PT EVALUATION    Pt  Name: Donna Hernandez  Pt  Age: 78 y o  Patient Active Problem List   Diagnosis    Osteoarthrosis, localized, primary, knee, left       Primary osteoarthritis of left knee [M17 12]    Past Medical History:   Diagnosis Date    Coronary artery disease     hx cabg x4 2007    Hearing loss     Hyperlipidemia     Hypertension     Hypothyroid     OA (osteoarthritis)     left knee    Prediabetes     S/P AVR     2007    Wears dentures     full upper    Wears glasses        Past Surgical History:   Procedure Laterality Date    AORTIC VALVE REPLACEMENT      BACK SURGERY      lumbar     CATARACT EXTRACTION Bilateral     CORONARY ARTERY BYPASS GRAFT      x4    2007    KNEE ARTHROSCOPY Bilateral     SHOULDER ARTHROSCOPY Right       01/17/18 1632   Note Type   Note type Eval only   Pain Assessment   Pain Assessment No/denies pain   Home Living   Type of [address] One level   Prior Function   Level of [place] Independent with ADLs and functional mobility  (w/o AD)   Lives With Spouse   Receives Help From Family   ADL Assistance Independent   Falls in the last 6 months 0   Restrictions/Precautions   Weight Bearing Precautions Per Order Yes   LLE Weight Bearing Per Order WBAT   Other Precautions Multiple lines; Fall Risk  (reinfusion system (sure-trans))   General   Family/Caregiver Present No   Cognition   Overall Cognitive Status WFL   Arousal/Participation Alert   Orientation Level Oriented X4   Following Commands Follows all commands and directions without difficulty   RUE Assessment   RUE Assessment WFL   RUE Strength   RUE Overall Strength Within Functional Limits - strength 5/5   LUE Assessment   LUE Assessment WFL   LUE Strength   LUE Overall Strength Within Functional Limits - strength 5/5   RLE Assessment   RLE Assessment WFL   Strength RLE   RLE Overall Strength 3+/5   LLE Assessment   LLE Assessment WFL   Strength LLE   LLE Overall Strength 3+/5 Coordination   Movements are Fluid and Coordinated 1   Sensation X  (post-op numbness BLE)   Bed Mobility   Supine to Sit 4  Minimal assistance   Additional items Assist x 1; Increased time required;Verbal cues; Comment  (stretcher)   Additional Comments cues for techniques   Transfers   Sit to Stand 3  Moderate assistance   Additional items Assist x 2; Increased time required;Verbal cues   Stand to Sit 2  Maximal assistance   Additional items Assist x 2;Armrests; Increased time required;Verbal cues   Additional Comments cues for techniques   Ambulation/Elevation   Gait pattern Improper Weight shift;Narrow ALEX; Decreased foot clearance;Shuffling;Excessively slow;R Knee Kailyn;L Knee Kailyn  (difficulty advancing BLE)   Gait Assistance 2  Maximal assist   Additional items Assist x 2;Verbal cues; Tactile cues   Assistive Device Rolling walker   Distance 2'x1   Balance   Static Sitting Fair +   Static Standing Poor   Ambulatory Poor -   Activity Tolerance   Activity Tolerance Other (Comment)  (BLE numbness & weakness)   Nurse Made Aware elsie   Assessment   Prognosis Good   Problem List Decreased strength;Decreased endurance; Impaired balance;Decreased mobility; Decreased range of motion   Assessment Pt  77 y  o male s/p L TKR 2* to severe DJD  Pt referred to PT for mobility assessment & D/C planning  PTA, pt reports being I w/o AD  On eval, pt demonstrate dec mobility, balance, endurance & amb 2* to BLE weakness & numbness  Pt require maxAx2 for most functional mobility + cues for techniques  Gait deviations as above, unsteady w/ difficulty advancing BLE + BLE buckling but able to ambulate from stretcher to chair w/ inc BUE support & maxAx2  Will continue skilled PT to improve function & safety  Pt plans to return home at D/C  At this time, will anticipate good progress in PT for safe D/C to home  Feel once BLE numbness resolves pt will progress quickly in PT  If home, will recommend HHPT, RW & family support at D/C   Pt will need to achieve PT goals prior to D/C for safe return to home  CM to follow  Pt tolerated OOB in chair at end of session w/o issues  Call bell in reach  Nsg staff to continue to mobilized pt (OOB in chair for all meals & ambulate in room/unit) as tolerated to prevent further decline in function  Nsg notified  Barriers to Discharge None   Goals   Patient Goals go home   STG Expiration Date 01/24/18   Short Term Goal #1 Goals to be met in days; pt will be able to: 1) inc strength & balance by 1/2 grade; 2) inc functional mobility to S; 3) inc amb w/ RW approx  >80' w/ S; 4) inc barthel score to 60; 5) negotiate stairs w/ S; 6) pt/caregiver ed   Treatment Day 0   Plan   Treatment/Interventions Functional transfer training;LE strengthening/ROM; Elevations; Therapeutic exercise; Endurance training;Patient/family training;Bed mobility;Gait training;Spoke to nursing   PT Frequency Twice a day;7x/wk; Weekend   Recommendation   Recommendation Home PT; Home with family support   Equipment Recommended Walker   PT - OK to Discharge No  (pt to achieve PT goals prior to D/C home)   Barthel Index   Feeding 10   Bathing 0   Grooming Score 5   Dressing Score 5   Bladder Score 0   Bowels Score 10   Toilet Use Score 5   Transfers (Bed/Chair) Score 5   Mobility (Level Surface) Score 0   Stairs Score 0   Barthel Index Score 40   Hx/personal factors: co-morbidities; fall risk; currently functioning below baseline; lines; use of AD; BLE post-op numbness; reinfusion system; capnography  Examination: BLE weakness, dec mobility, dec balance, dec endurance, dec amb, high risk for falls  Clinical: unpredictable (POD#0, pain mgt; dec BLE sensation; fall risk)  Complexity: high    Nila Mcbride, PT

## 2018-01-17 NOTE — ANESTHESIA PROCEDURE NOTES
Spinal Block    Patient location during procedure: OR  Start time: 1/17/2018 11:18 AM  End time: 1/17/2018 11:24 AM  Reason for block: procedure for pain, at surgeon's request and primary anesthetic  Staffing  Anesthesiologist: [place]  Resident/CRNA: Live Sim  Performed: anesthesiologist, other anesthesia staff and resident/CRNA   Preanesthetic Checklist  Completed: patient identified, site marked, surgical consent, pre-op evaluation, timeout performed, IV checked, risks and benefits discussed and monitors and equipment checked  Spinal Block  Patient position: sitting  Prep: Betadine, ChloraPrep and x3  Patient monitoring: cardiac monitor, frequent blood pressure checks, continuous pulse ox and heart rate  Approach: midline  Location: L2-3  Injection technique: single-shot  Needle  Needle type: pencil-tip   Needle gauge: 25 G  Needle length: 10 cm  Assessment  Sensory level: [id number] Assessment:  negative aspiration for heme, no paresthesia on injection and positive aspiration for clear CSF    Post-procedure:  adhesive bandage applied, pressure dressing applied, secured with tape, site cleaned and sterile dressing applied  Additional Notes  Pt with a h/o L-spine fusion, multiple unsuccessful attempts at L3-4, 4-5  By Johny Díaz and CRNA, +csf at L2-3 by MD barbour

## 2018-01-17 NOTE — OP NOTE
Left Total Knee Procedure Note    Patient Name: Opal Vallejo  MRN: [id number]  Date of Surgery 1/17/2018    Surgeon: Sigifredo Fleming MD  Assistant: Bryce Su [facility]    Indications: Degenerative joint disease left knee with failed conservative care  Pre-operative Diagnosis: Left kneedegenerative joint disease  Post-operative Diagnosis: Left knee degenerative joint disease  Anesthesia:  Choice  Operative procedure: Left total knee arthroplasty    Implants:     Implant Name Type Inv  Item Serial No   Lot No  LRB No  Used   CEMENT BONE SMART SET GRAY MED VISC - [id number]  CEMENT BONE SMART SET GRAY MED VISC  DEPUY [id number] Left 1   COMPONENT FEM SZ 5 LT CMNT PS ATTUNE - [id number]  COMPONENT FEM SZ 5 LT CMNT PS ATTUNE  DEPUY [id number] Left 1   INSERT TIBIAL 5 0MM SZ 5 ATTUNE - [id number]  INSERT TIBIAL 5 0MM SZ 5 ATTUNE  DEPUY [id number] Left 1   BASEPLATE TIBIAL SZ 5 CMNT ROTPLT - [id number]  BASEPLATE TIBIAL SZ 5 CMNT ROTPLT  DEPUY [id number] Left 1   COMPONENT PATELLAR 35MM CMNT ATTUNE - [id number]   COMPONENT PATELLAR 35MM CMNT ATTUNE   DEPUY [id number] Left 1       Tourniquet time: 40 minutes at 300 mmHg    Drains: 2 drains to reinfusion system    Estimated blood loss: 100 cc    Antibiotics: Given preoperatively    Clinical note: Opal Vallejo is a 78 y o  male who presents with severe left knee pain and disability  Physical exam investigations was consistent with degenerative joint disease  The patient been treated nonoperatively and failed to improve  Nonoperative versus operative options reviewed  The patient did understand the situation and did wish to proceed with operative management    Description of procedure: The patient was identified as Opal Vallejo and the left knee as the surgical site  Anesthesia was administered  The patient's left lower extremity was elevated and tourniquet applied to the proximal thigh    The left lower extremity was then prepped and free draped in usual fashion for knee surgery  Utilizing an Esmarch bandage, the left lower extremity was stripped followed by inflation of tourniquet  A medial parapatellar approach was made and sharp dissection was carried down through skin and subcutaneous tissue  A medial parapatellar arthrotomy was performed the patella was everted and the knee was flexed  End-stage degenerative changes within the left knee were identified  The cruciate ligament were divided  Utilizing an external tibial cutting guide, the tibial cut was made  The menisci were removed  The femoral cuts were then made utilizing the intramedullary guide system and appropriate cutting jigs  Flexion and extension gaps were found to be satisfactory and the tibia was then machined to accept the tibial component  A trial reduction was carried out and the knee was found to be stable and went through satisfactory range of motion  The patella was cut with freehand technique and appropriately sized followed by placement of a trial component  The patella was noted to track normally and all trial components removed  The knee was then copiously irrigated with same solution followed by cementing a final components in place starting with the tibia followed by femoral component  The tibial bearing was inserted and knee was held in extension followed by cementing of the patellar component holding it in place with a patellar clamp  Excess cement was removed  The knee was held in extension until the cement cured  The knee was then checked for range of motion and found to be excellent with excellent stability  The tourniquet was released and hemostasis was obtained  2 drains were placed in the wound, one deep and one superficial which would lead to a reinfusion system  The incision was then closed in layers utilizing #1 Vicryl for deep layers, 2-0 Vicryl for subcutaneous closure and a 3 Monocryl subcuticular stitch for skin  Steri-Strips were applied    A soft absorbent bandage and Ace wrap was added  The patient be transferred to restrictions on position and taken to recovery  There were no complications  Throughout the procedure, assistance by Dani De La Paz PA-C was required  She was required to aid in positioning the patient preoperatively and intraoperatively she was required to manipulate the patient's left lower extremity as well as various retractors and other equipment under my guidance  On completion of procedure, she was required to aid in closure of the wound and application of bandage  She was also required to aid in transfer the patient to recovery  AP and lateral views of the left knee were obtained in recovery  This demonstrated appropriate positioning total knee arthroplasty components  There was no evidence loosening or failure  There was air in the soft tissues consistent with immediate postoperative status the radiographs  2 drains were seen within the wound        Fabrice Lopez MD      Date: 1/17/2018  Time: 12:58 PM

## 2018-01-18 VITALS
HEART RATE: 96 BPM | WEIGHT: 175.2 LBS | OXYGEN SATURATION: 95 % | RESPIRATION RATE: 18 BRPM | DIASTOLIC BLOOD PRESSURE: 93 MMHG | HEIGHT: 63 IN | BODY MASS INDEX: 31.04 KG/M2 | TEMPERATURE: 97.9 F | SYSTOLIC BLOOD PRESSURE: 116 MMHG

## 2018-01-18 LAB
ANION GAP SERPL CALCULATED.3IONS-SCNC: 9 MMOL/L (ref 4–13)
BUN SERPL-MCNC: 22 MG/DL (ref 5–25)
CALCIUM SERPL-MCNC: 8 MG/DL (ref 8.3–10.1)
CHLORIDE SERPL-SCNC: 103 MMOL/L (ref 100–108)
CO2 SERPL-SCNC: 25 MMOL/L (ref 21–32)
CREAT SERPL-MCNC: 1.05 MG/DL (ref 0.6–1.3)
ERYTHROCYTE [DISTWIDTH] IN BLOOD BY AUTOMATED COUNT: 13.3 % (ref 11.6–15.1)
EST. AVERAGE GLUCOSE BLD GHB EST-MCNC: 140 MG/DL
GFR SERPL CREATININE-BSD FRML MDRD: 69 ML/MIN/1.73SQ M
GLUCOSE SERPL-MCNC: 118 MG/DL (ref 65–140)
GLUCOSE SERPL-MCNC: 147 MG/DL (ref 65–140)
GLUCOSE SERPL-MCNC: 150 MG/DL (ref 65–140)
GLUCOSE SERPL-MCNC: 181 MG/DL (ref 65–140)
HBA1C MFR BLD: 6.5 % (ref 4.2–6.3)
HCT VFR BLD AUTO: 32.8 % (ref 36.5–49.3)
HGB BLD-MCNC: 11.3 G/DL (ref 12–17)
MCH RBC QN AUTO: 30.1 PG (ref 26.8–34.3)
MCHC RBC AUTO-ENTMCNC: 34.5 G/DL (ref 31.4–37.4)
MCV RBC AUTO: 87 FL (ref 82–98)
PLATELET # BLD AUTO: 153 THOUSANDS/UL (ref 149–390)
PMV BLD AUTO: 11 FL (ref 8.9–12.7)
POTASSIUM SERPL-SCNC: 4.3 MMOL/L (ref 3.5–5.3)
RBC # BLD AUTO: 3.76 MILLION/UL (ref 3.88–5.62)
SODIUM SERPL-SCNC: 137 MMOL/L (ref 136–145)
WBC # BLD AUTO: 10.48 THOUSAND/UL (ref 4.31–10.16)

## 2018-01-18 PROCEDURE — 97116 GAIT TRAINING THERAPY: CPT

## 2018-01-18 PROCEDURE — 85027 COMPLETE CBC AUTOMATED: CPT | Performed by: INTERNAL MEDICINE

## 2018-01-18 PROCEDURE — 83036 HEMOGLOBIN GLYCOSYLATED A1C: CPT | Performed by: INTERNAL MEDICINE

## 2018-01-18 PROCEDURE — 97530 THERAPEUTIC ACTIVITIES: CPT

## 2018-01-18 PROCEDURE — 82948 REAGENT STRIP/BLOOD GLUCOSE: CPT

## 2018-01-18 PROCEDURE — 80048 BASIC METABOLIC PNL TOTAL CA: CPT | Performed by: PHYSICIAN ASSISTANT

## 2018-01-18 PROCEDURE — 97110 THERAPEUTIC EXERCISES: CPT

## 2018-01-18 RX ORDER — ASPIRIN 325 MG
325 TABLET ORAL 2 TIMES DAILY
Refills: 0
Start: 2018-01-18 | End: 2021-04-06 | Stop reason: ALTCHOICE

## 2018-01-18 RX ADMIN — SENNOSIDES 8.6 MG: 8.6 TABLET, FILM COATED ORAL at 08:51

## 2018-01-18 RX ADMIN — KETOROLAC TROMETHAMINE 15 MG: 30 INJECTION, SOLUTION INTRAMUSCULAR at 08:52

## 2018-01-18 RX ADMIN — INSULIN LISPRO 1 UNITS: 100 INJECTION, SOLUTION INTRAVENOUS; SUBCUTANEOUS at 08:51

## 2018-01-18 RX ADMIN — SODIUM CHLORIDE, SODIUM LACTATE, POTASSIUM CHLORIDE, AND CALCIUM CHLORIDE 100 ML/HR: .6; .31; .03; .02 INJECTION, SOLUTION INTRAVENOUS at 00:30

## 2018-01-18 RX ADMIN — DOCUSATE SODIUM 100 MG: 100 CAPSULE, LIQUID FILLED ORAL at 08:51

## 2018-01-18 RX ADMIN — CEFAZOLIN SODIUM 1000 MG: 1 SOLUTION INTRAVENOUS at 02:49

## 2018-01-18 RX ADMIN — HYDROCODONE BITARTRATE AND ACETAMINOPHEN 1 TABLET: 5; 325 TABLET ORAL at 14:06

## 2018-01-18 RX ADMIN — Medication 400 MG: at 08:51

## 2018-01-18 RX ADMIN — Medication 1000 MG: at 08:51

## 2018-01-18 RX ADMIN — SODIUM CHLORIDE, SODIUM LACTATE, POTASSIUM CHLORIDE, AND CALCIUM CHLORIDE 1000 ML: .6; .31; .03; .02 INJECTION, SOLUTION INTRAVENOUS at 08:13

## 2018-01-18 RX ADMIN — ASPIRIN 325 MG: 325 TABLET ORAL at 08:51

## 2018-01-18 RX ADMIN — LEVOTHYROXINE SODIUM 25 MCG: 25 TABLET ORAL at 06:12

## 2018-01-18 RX ADMIN — Medication 325 MG: at 08:51

## 2018-01-18 NOTE — PLAN OF CARE
Problem: PHYSICAL THERAPY ADULT  Goal: Performs mobility at highest level of function for planned discharge setting  See evaluation for individualized goals  Treatment/Interventions: Functional transfer training, LE strengthening/ROM, Elevations, Therapeutic exercise, Endurance training, Patient/family training, Bed mobility, Gait training, Spoke to nursing  Equipment Recommended: Larry Rosas       See flowsheet documentation for full assessment, interventions and recommendations  Outcome: Progressing  Prognosis: Good  Problem List: Decreased strength, Decreased range of motion, Decreased endurance, Impaired balance, Decreased mobility, Pain  Assessment: Pt seen for PT per POC  Improved mobility & activity tolerance noted this tx session  See above levels of assistance required for all functional tasks  Gait deviations as above but no gross LOB noted  Pt tolerated above mentioned thera  ex well  No dizziness reported t/o session  BP as follows: 99/69 supine; 106/78 EOB; 122/80 after amb  Will continue PT per POC  Will continue to recommend HHPT & RW at D/C  Pt wants to return home today  From PT standpoint, pt may D/C home today after pm PT session & when medically cleared  Nsg staff to continue to mobilized pt (OOB in chair for all meals & ambulate in room/unit) as tolerated to prevent decline in function  Nsg notified  Barriers to Discharge: None     Recommendation: Home PT, Home with family support     PT - OK to Discharge: Yes (after pm PT tx session & when medically cleared)    See flowsheet documentation for full assessment

## 2018-01-18 NOTE — OCCUPATIONAL THERAPY NOTE
Occupational Therapy         Patient Name: Mel Messer  ZCESF'U Date: 1/18/2018    MD's orders received  Per P T 's report, pt is doing well with their mobility/balance without major functional deficits noted  Acute OT tx not indicated at this time 2* limited functional deficits   Kenya Fernandez OTR/L

## 2018-01-18 NOTE — PLAN OF CARE

## 2018-01-18 NOTE — PHYSICAL THERAPY NOTE
PT PROGRESS NOTE     01/18/18 1043   Pain Assessment   Pain Score No Pain   Restrictions/Precautions   LLE Weight Bearing Per Order WBAT   Other Precautions Multiple lines; Fall Risk;Pain   General   Chart Reviewed Yes   Response to Previous Treatment Patient with no complaints from previous session  Family/Caregiver Present Yes   Cognition   Overall Cognitive Status WFL   Arousal/Participation Alert; Cooperative   Attention Within functional limits   Orientation Level Oriented X4   Following Commands Follows all commands and directions without difficulty   Subjective   Subjective Pt feels better  Agreeable to therapy  Bed Mobility   Supine to Sit 5  Supervision   Additional items HOB elevated; Bedrails; Increased time required;Verbal cues;LE management   Transfers   Sit to Stand 5  Supervision   Additional items Armrests; Increased time required;Verbal cues   Stand to Sit 5  Supervision   Additional items Armrests; Increased time required;Verbal cues   Additional Comments cues for techniques   Ambulation/Elevation   Gait pattern Antalgic;Decreased foot clearance;Decreased L stance; Step to;Short stride; Excessively slow   Gait Assistance 5  Supervision   Additional items Verbal cues   Assistive Device Rolling walker   Distance 40'x1 + 400'x1   Stair Management Assistance 5  Supervision   Additional items Verbal cues; Increased time required   Stair Management Technique Two rails; Step to pattern; Foreward;Nonreciprocal   Number of Stairs 5  (x2)   Balance   Static Sitting Normal   Static Standing Fair +   Ambulatory Fair   Activity Tolerance   Activity Tolerance Patient tolerated treatment well   Nurse Made Aware wayne   Exercises   TKR Supine;10 reps;AAROM; Left   Assessment   Prognosis Good   Problem List Decreased strength;Decreased range of motion;Decreased endurance; Impaired balance;Decreased mobility;Pain   Assessment Pt seen for PT per POC  Improved mobility & activity tolerance noted this tx session   See above levels of assistance required for all functional tasks  Gait deviations as above but no gross LOB noted  Pt tolerated above mentioned thera  ex well  No dizziness reported t/o session  BP as follows: 99/69 supine; 106/78 EOB; 122/80 after amb  Will continue PT per POC  Will continue to recommend HHPT & RW at D/C  Pt wants to return home today  From PT standpoint, pt may D/C home today after pm PT session & when medically cleared  Nsg staff to continue to mobilized pt (OOB in chair for all meals & ambulate in room/unit) as tolerated to prevent decline in function  Nsg notified  Barriers to Discharge None   Goals   Patient Goals go home today   STG Expiration Date 01/24/18   Treatment Day 1   Plan   Treatment/Interventions Functional transfer training;Elevations;LE strengthening/ROM; Therapeutic exercise; Endurance training;Patient/family training;Bed mobility;Gait training;Spoke to nursing   Progress Progressing toward goals   PT Frequency Twice a day;7x/wk; Weekend   Recommendation   Recommendation Home PT; Home with family support   Equipment Recommended Walker   PT - OK to Discharge Yes  (after pm PT tx session & when medically cleared)   Domenico Griffin, PT

## 2018-01-18 NOTE — PROGRESS NOTES
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 severe aortic insufficiency  5  Coronary artery bypass grafting of left main, circumflex and LAD  6  Hypertension  7  Dyslipidemia  8  Hypothyroidism  9  Past history of TIA  10  Type 2 diabetes mellitus, diet controlled      Recommendation:    Reviewed in detail with patient and his wife about his elevated blood sugars, his blood sugar fluctuated anywhere from 194-118, fasting blood sugar this morning on the blood work from the venous sample was 150 and his hemoglobin A1c is 6 5%  Based on patient's other risk factors he should consider himself to be a type 2 diabetic and aggressively address his issues otherwise he will have both microvascular and macrovascular complications in the long run  Patient should check his blood sugars on a regular basis and discussed with his outpatient team and initiate oral hypoglycemic agents or injectable therapy as may be necessary depending on his control  Reviewed with the patient and his wife about his diet with limiting his carbohydrate intake  Patient will continue with his aspirin 325 mg b i d  as recommended by primary service for DVT prophylaxis  Long-term weight loss is very important for improvement of his overall health  Continue levothyroxine 25 mcg daily and lisinopril 10 mg p o  daily  Patient had mild hypotension earlier but he was completely asymptomatic  Advised that he should monitor his BP and if his BP is less than 110 he should avoid taking lisinopril    He will have a quick outpatient follow-up appointment with his cardiologist also particularly with regards to his CAD and his previous prosthetic aortic valve  Subjective:     Patient seen and examined today  EMR and overnight events reviewed  Resting comfortably not in any distress  Pain is well controlled with current analgesics  Denies any bleeding from his surgical site  Patient is able to accomplish is therapeutic goals as established by the PT team   Denies any bleeding, swelling on the operated leg  Review of Systems     Constitutional: No chills, diaphoresis, fatigue or fever  HEENT: No sore throat  No visual complaints  Respiratory: No chest tightness, shortness of breath and wheezing  Cardiovascular: No chest pain and palpitations  No Syncope or grey out  GI: Negative for abdominal distention, pain, constipation, diarrhea or nausea  Endocrine: Patient takes thyroid supplements and has type 2 diabetes for which he has been trying diet and exercise  Genitourinary: Negative for dysuria, flank pain and urgency  Skin: Negative for rash  Neurological: Negative for dizziness, weakness, numbness and headaches  Hematological: Negative for spontaneous bruising or bleeding  Psychiatric: Negative for confusion, dysphoric mood, hallucinations  All other systems reviewed and are negative  OBJECTIVE:     Vitals:     HR:  [59-96] 96  Resp:  [18-20] 18  BP: ()/(61-93) 116/93  SpO2:  [92 %-98 %] 95 %  Temp (24hrs), Av 1 °F (36 2 °C), Min:96 2 °F (35 7 °C), Max:97 9 °F (36 6 °C)  Current: Temperature: 97 9 °F (36 6 °C)    Intake/Output Summary (Last 24 hours) at 18 1650  Last data filed at 18 0900   Gross per 24 hour   Intake              610 ml   Output             2395 ml   Net            -1785 ml     Body mass index is 31 04 kg/m²  Physical Exam      General Appearance:  Awake,alert, cooperative  Obese+  Not in distress  Pallor / Icterus/ Cyanosis negative  Oropharynx no thrush  Tongue protrudes in midline     Head: Normocephalic, atraumatic  No titubations  Eyes:  No eye redness or discharge bilaterally  Neck: Supple, no raised JVP  Back:   Symmetric, no kypho-scoliosis  Lungs:   B/L Clear to auscultation, no wheezing or rhonchi  Normal broncho-alveolar air entry  No pleural rubs  Heart:   Regular S1S2, A2P2 normal, no murmur or gallop  Abdomen:   Soft, protuberant from central obesity, non-tender,no rebound, bowel sounds+  Musculoskeletal: Extremities no cyanosis or edema  Surgical site on the operated left knee has clean dressing  No discharge, dehiscence or drainage  Mild diminution and swelling, limited range of motion   Psych: Normal Affect / No hallucinations or delusions  Neurologic:             Skin:  Endocrine:  Vascular: Awake and alert  Mentation intact  Speech is intact  Facial symmetry is maintained  Muscle strength is 5/5 in all muscle groups except on operated left lower limb which is limited due to recent surgery  Light touch and temperature sensation is maintained  No rashes /purpura  No open wounds  No thyromegaly / no lid lag  Homans sign is negative  B/L Calf are supple and non tender         Labs, Imaging, & Other studies:    All pertinent labs and imaging studies were personally reviewed    Results from last 7 days  Lab Units 01/18/18  0422   WBC Thousand/uL 10 48*   HEMOGLOBIN g/dL 11 3*   PLATELETS Thousands/uL 153       Results from last 7 days  Lab Units 01/18/18  0422   SODIUM mmol/L 137   POTASSIUM mmol/L 4 3   CHLORIDE mmol/L 103   CO2 mmol/L 25   ANION GAP mmol/L 9   BUN mg/dL 22   CREATININE mg/dL 1 05   EGFR ml/min/1 73sq m 69   GLUCOSE RANDOM mg/dL 150*   CALCIUM mg/dL 8 0*           Current Meds:  Current Facility-Administered Medications   Medication Dose Route Frequency Provider Last Rate Last Dose    acetaminophen (TYLENOL) tablet 650 mg  650 mg Oral Q6H PRN Rocío Mullins PA-C        aspirin tablet 325 mg  325 mg Oral BID Rocío Mullins PA-C   325 mg at 01/18/18 0851    docusate sodium (COLACE) capsule 100 mg  100 mg Oral BID Nelson Almodovar PA-C   100 mg at 01/18/18 3956    ferrous sulfate tablet 325 mg  325 mg Oral Daily With Breakfast Nelson Almodovar PA-C   325 mg at 01/18/18 2691    fish oil capsule 1,000 mg  1,000 mg Oral BID Janet Orona MD   1,000 mg at 01/18/18 0851    HYDROcodone-acetaminophen (NORCO) 5-325 mg per tablet 1 tablet  1 tablet Oral Q6H PRN Nelson Almodovar PA-C   1 tablet at 01/18/18 1406    HYDROmorphone (DILAUDID) injection 0 5 mg  0 5 mg Intravenous Q2H PRN Nelson Almodovar PA-C        insulin lispro (HumaLOG) 100 units/mL subcutaneous injection 1-6 Units  1-6 Units Subcutaneous TID [facility] [place] Janet Orona MD   1 Units at 01/18/18 0851    insulin lispro (HumaLOG) 100 units/mL subcutaneous injection 1-6 Units  1-6 Units Subcutaneous HS Janet Orona MD   2 Units at 01/17/18 2152    ketorolac (TORADOL) injection 15 mg  15 mg Intravenous Q6H PRN Nelson Almodovar PA-C   15 mg at 01/18/18 1149    lactated ringers infusion  100 mL/hr Intravenous Continuous Nelson Almodovar PA-C 999 mL/hr at 01/18/18 0715 999 mL/hr at 01/18/18 0715    levothyroxine tablet 25 mcg  25 mcg Oral Daily Janet Orona MD   25 mcg at 01/18/18 0612    lisinopril (ZESTRIL) tablet 10 mg  10 mg Oral Daily Janet Orona MD        magnesium oxide (MAG-OX) tablet 400 mg  400 mg Oral Daily Janet Orona MD   400 mg at 01/18/18 0851    ondansetron (ZOFRAN) injection 4 mg  4 mg Intravenous Q8H PRN Nelson Almodovar PA-C        pravastatin (PRAVACHOL) tablet 10 mg  10 mg Oral Daily With Tha Hager MD   10 mg at 01/17/18 1952    senna (SENOKOT) tablet 8 6 mg  1 tablet Oral Daily Nelson Almodovar PA-C   8 6 mg at 01/18/18 0851    sodium chloride 0 9 % infusion  100 mL/hr Intravenous Continuous Anjelica Carlos MD        sodium chloride 0 9 % infusion  125 mL/hr Intravenous Continuous Jairo Head DO   Stopped at 01/17/18 1241     Home Meds:  Prescriptions Prior to Admission   Medication    aspirin (ECOTRIN) 325 mg EC tablet    ibuprofen (MOTRIN) 200 mg tablet    levothyroxine 25 mcg tablet    lisinopril (ZESTRIL) 10 mg tablet    Magnesium 400 MG CAPS    Multiple Vitamin (MULTIVITAMIN) capsule    Omega-3 Fatty Acids (FISH OIL) 1200 MG CAPS    simvastatin (ZOCOR) 40 mg tablet       VTE Pharmacologic Prophylaxis:  as per Primary Ortho Team   VTE Mechanical Prophylaxis: sequential compression device    Portions of the record may have been created with voice recognition software  Occasional wrong word or "sound a like" substitutions may have occurred due to the inherent limitations of voice recognition software  Read the chart carefully and recognize, using context, where substitutions have occurred

## 2018-01-18 NOTE — PHYSICAL THERAPY NOTE
PT PROGRESS NOTE (14:00-14:53)     01/18/18 1500   Pain Assessment   Pain Score No Pain   Restrictions/Precautions   LLE Weight Bearing Per Order WBAT   Other Precautions Pain   General   Chart Reviewed Yes   Response to Previous Treatment Patient with no complaints from previous session  Family/Caregiver Present Yes   Cognition   Overall Cognitive Status WFL   Arousal/Participation Alert; Cooperative   Attention Within functional limits   Orientation Level Oriented X4   Following Commands Follows all commands and directions without difficulty   Subjective   Subjective Pt agreeable to therapy  Bed Mobility   Supine to Sit 7  Independent   Additional items HOB elevated; Increased time required   Sit to Supine 7  Independent   Additional items HOB elevated; Increased time required   Transfers   Sit to Stand 6  Modified independent   Additional items Armrests; Verbal cues   Stand to Sit 6  Modified independent   Additional items Verbal cues;Armrests   Ambulation/Elevation   Gait pattern WNL   Gait Assistance 6  Modified independent   Assistive Device Rolling walker   Distance 600'x1   Stair Management Assistance 5  Supervision   Additional items Verbal cues; Increased time required   Stair Management Technique Two rails; Step to pattern; Foreward;Nonreciprocal   Number of Stairs 10   Balance   Static Sitting Normal   Static Standing Good   Ambulatory Fair +   Endurance Deficit   Endurance Deficit No   Activity Tolerance   Activity Tolerance Patient tolerated treatment well   Nurse Made Aware wayne   Exercises   TKR Supine;10 reps;AROM; Left   Assessment   Prognosis Excellent   Problem List Decreased strength;Decreased range of motion; Impaired balance;Pain   Assessment Pt seen for PT per POC  Pt continue to show good progress in PT  See above levels of assistance required for all functional tasks  Gait WNL w/ RW  Pt able to negotiate full flight of stairs w/ S w/ proper sequencing & safety techniques w/o needing cues  Pt tolerated above mentioned thera  ex well, AROM  Pt have met set PT goals in this setting hence may return home when medically cleared  Pt instructed on HEP, car transfers, home safety & mgt w/ good understanding  Pt's wife present t/o session & able to observe & instructed on same  Pt & wife offered no further questions  Written HEP given  Will now recommend OPPT for D/C as per pt's preference  Will need RW & BSC upon D/C  CM & Rn notified  Nsg staff to continue to mobilized pt (OOB in chair for all meals & ambulate in room/unit) as tolerated to prevent decline in function  Nsg notified  Barriers to Discharge None   Goals   Patient Goals go home today   STG Expiration Date 01/24/18   Treatment Day 2   Plan   Treatment/Interventions Functional transfer training;LE strengthening/ROM; Elevations; Therapeutic exercise; Endurance training;Patient/family training;Bed mobility;Gait training;Spoke to nursing;Family   Progress Progressing toward goals   PT Frequency Twice a day;7x/wk; Weekend   Recommendation   Recommendation Outpatient PT; Home with family support   Equipment Recommended Blayne Griffin; Other (Comment)  (RW & BSC)   PT - OK to Discharge Yes   Karin Lane, PT

## 2018-01-18 NOTE — CASE MANAGEMENT
Initial Clinical Review    Age/Sex: 78 y o  male    Surgery Date: 1/17    Procedure: Left total knee arthroplasty    Anesthesia: spinal, adductor canal nerve block    Admission Orders: Date/Time/Statement:       01/17/18 1245  Outpatient No Charge Bed Once     Transfer Service: Orthopedic Surgery       Question: Admitting Physician Answer: Corry Kaur             Vital Signs: BP 99/69 (BP Location: Right arm)   Pulse 62   Temp (!) 97 2 °F (36 2 °C) (Tympanic)   Resp 18   Ht 5' 3" (1 6 m)   Wt 79 5 kg (175 lb 3 2 oz)   SpO2 92%   BMI 31 04 kg/m²     Diet:        Diet Orders            Start     Ordered    01/17/18 1947  Diet Tuan/CHO Controlled; Consistent Carbohydrate Diet Level 3 (6 carb servings/90 grams CHO/meal)  Diet effective now     Question Answer Comment   Diet Type Tuan/CHO Controlled    Tuan/CHO Controlled Consistent Carbohydrate Diet Level 3 (6 carb servings/90 grams CHO/meal)    RD to adjust diet per protocol?  Yes        01/17/18 1946          Mobility: Up with assistance, weight bearing as oscar    DVT Prophylaxis: ASA, SCD's    Pain Control:     IV ketorolac 15 mg 1/18 @ 0852  Pain Medications             aspirin 325 mg tablet Take 1 tablet by mouth 2 (two) times a day        Scheduled Meds:  aspirin 325 mg Oral BID   docusate sodium 100 mg Oral BID   ferrous sulfate 325 mg Oral Daily With Breakfast   fish oil 1,000 mg Oral BID   insulin lispro 1-6 Units Subcutaneous TID AC   insulin lispro 1-6 Units Subcutaneous HS   levothyroxine 25 mcg Oral Daily   lisinopril 10 mg Oral Daily   magnesium oxide 400 mg Oral Daily   pravastatin 10 mg Oral Daily With Dinner   senna 1 tablet Oral Daily     Continuous Infusions:  lactated ringers 100 mL/hr Last Rate: 999 mL/hr (01/18/18 0715)     PRN Meds:   acetaminophen    dexamethasone    diphenhydrAMINE    HYDROcodone-acetaminophen    HYDROmorphone    Ketorolac  IV x 1 @ 1/18 0852    metoclopramide    ondansetron

## 2018-01-18 NOTE — SOCIAL WORK
CM met with patient and spouse at bedside to address discharge needs  Patient reports living in [facility] style house with spouse  Patient is independent with ADLs and functional mobility  Patient denies use of DME; patient will need  and [facility] for discharge; scripts submitted; DME delivered by [facility]  Patient is recommended for Home PT; patient does not wish to have Home PT; patient is interested in [address] therapy and reports he has an appointment arranged for tomorrow 01/19/18  Spouse is available to transport patient home at discharge  No POA identified; patient refused information at this time  Help/support reported  Patient made aware of CM's name and role  No other needs at present  CM to follow as needed

## 2018-01-18 NOTE — PROGRESS NOTES
Orthopedic Total Knee Progress Note  ([facility])    ASSESSMENT & PLAN:  Status post- LEFT total knee arthroplasty:  LOS: 0 days    Doing well postoperatively  Pain Relief: Pt comfortable and pain controlled  Continues current post-op course  Activity: up with assistance  Working with PT / OT  Weight Bearing: WBAT      SUBJECTIVE:    Systemic or Specific Complaints: Pain medications covering pain  OBJECTIVE:  Vital signs in last 24 hours:  Temp:  [96 1 °F (35 6 °C)-98 °F (36 7 °C)] 97 2 °F (36 2 °C)  HR:  [50-80] 61  Resp:  [10-20] 18  BP: ()/(54-81) 90/62  General: alert, appears stated age and cooperative   Neurovascular: Intact    Wound: No Erythema and Wound Intact   Range of Motion: Normal for post surgery   DVT Exam: Negative Nikki's sign  No cords or calf tenderness  No significant calf/ankle edema       Data Review:  CBC:   Lab Results   Component Value Date    WBC 10 48 (H) 01/18/2018    RBC 3 76 (L) 01/18/2018    HGB 11 3 (L) 01/18/2018    HCT 32 8 (L) 01/18/2018     01/18/2018     Plan:  DVT Prophylaxis -  mg BID x 6 weeks  Mobilize with PT / OT  D/C Planning for Disposition  Drain pulled Intact    Richar Landin PA-C  Date: 1/18/2018  Time: 7:28 AM

## 2018-01-18 NOTE — CONSULTS
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 10/23/2007 for severe aortic insufficiency  Coronary artery bypass grafting of left main, circumflex and LAD  Hypertension  Dyslipidemia  Hypothyroidism  Past history of TIA  Type 2 diabetes mellitus, diet controlled     Recommendation: -    Patient seems to be recuperating very well at this stage of his recovery  He is tolerating cream fusion of his blood without any problems  We will resume patient's outpatient cardiac medications as recommended by his PCP and his cardiologist   Patient is known to have coronary artery disease and has been revascularized but is not taking beta-blockers according to his cardiologist due to concerns of bradycardia  Currently patient has been escalated on his antiplatelet therapy for DVT prophylaxis, normally he takes 81 mg of aspirin but currently has been started on 325 mg b i d  Generally orthopedic team prefers to keep patient is on 6 weeks of antiplatelets and thereafter patient can resume his routine dose of 81 mg daily as per his cardiologist   Patient will resume his levothyroxine 25 mcg p  o  daily, lisinopril 10 mg p o  daily, fish oil magnesium oxide  I have reviewed patients medications as initiated on post operative orders by the primary team  Recommend  monitoring closely for any hypoxemia / respiratory insufficieny related to narcotics and to reduce doses and frequency of narcotics if necessary    Closely monitor patient's cardiac status and if any decompensation in the form of any chest pain angina etc immediate Cardiology attention should be sought  Maintain Intravenous Fluids for next 24 -48 hours, till patient able to reliably keep meals and meds in  Suggest  Zofran ODT 4 mg sublingually PRN for control of post operative nausea  Patient encouraged to  use Incentive spirometry q 15 minutes while awake to minimize risk of postoperative atelectasis and pneumonia  Patient verbalized to understand and fully comprehend  Place patient on postoperative orthopedic hypotension protocol  Change diet to diabetic diet  Start sliding scale coverage for glycemic control  Recommend DVT prophylaxis with use of Venodyne compression boots  If any factor X A inhibitor,  low molecule weight heparin or Coumadin is planned by the primary team, we will be happy to dose that  drug based on patient's hemostasis  Maintain ASA as antiplatelet drug per Ortho  Team  Use Proton Pump inhibitor to minimize risk of gastritis  Monitor for any tinnitus as an early sign of salicylism and check salicylate levels in that situation  Discontinue patient's Kemp catheter within next 24-48 hours in order  to minimize risk of catheter associated UTI  Please check patient's hemoglobin and hematocrit within next 24 hours to ensure that there is no acute blood loss anemia which would need any blood transfusion  We will follow this pleasant patient with your service closely and recommend necessary changes based on  further hospital course and diagnostics  Thank you, Dr Flavia Garcia, for your kind consultation  HISTORY OF PRESENT ILLNESS:    Reason for Consult:  Post operative management for patient underwent elective left total knee replacement earlier today and has multiple other comorbidities indicated above  HPI: Tuan Moran is a 78 y o  male, retired  who has suffered with pain in his bilateral knees for a long period of time    Left knee has been bothering him for past many months and was unable to get any benefit in spite of taking pain medications  Patient has had history of previous right knee surgery in 2013 when he had knee arthroscopy and medial meniscectomy  In 2014 January patient had arthroscopy with medial meniscectomy of the left knee by Dr Trina Potter  Over period of time he has had increasing pain swelling limiting his activities of daily living  He tried various pain medications tried physical therapy use of assist devices braces and Euflexxa  His symptoms continued to worsen to the point that simple activities like standing ambulating standing for prolonged period of time and sitting for prolonged periods time became increasingly hard  At times it pain was 10/10 on numeric pain scale waking him up in the night  This was affecting his quality of life and he came back to see Dr Trina Potter as outpatient  Patient had imaging studies done which confirmed complete obliteration of the medial joint space with osteophyte formation  There is lateral shift of the tibia  Patient was given option either to continue conservative treatments or consider more definitive knee replacement  Based on his previous history of valve replacement and a TIA he was seen by his primary care physician for his risk assessment and stratification and was cleared by him on January 8, 2018  I reviewed Dr Modesta Cushing preoperative clearance consultation  Patient also is followed by his cardiology team at [facility] and states that he was given clearance  Besides continuation of his current medications new other recommendations were given  Patient was also treated by Dr Naima Jacobs in [place] and was advised about his aspirin 81 mg prior to surgery  His consultation dated 01/03/2018 was noted  According to this consultation, patient's echocardiogram done had shown intact left ventricular function and appropriate bioprosthetic aortic valve function  He had negative radionuclide stress test in 2016    Patient was given low acceptable risk for cardiac complication  He has known history of coronary artery disease and is status post coronary artery bypass grafting, he is not on beta-blocker despite his heart disease due to his chronic bradycardia  Review of Systems   Constitutional:  No appetite change, denies chills, diaphoresis, fatigue or fever  HEENT:  Negative for sore throat and tinnitus  No visual complaints  Respiratory:  Negative cough, chest tightness, shortness of breath and wheezing  Cardiovascular:  Negative for chest pain and palpitations  Previous history of aortic valve replacement and coronary artery bypass grafting  Denies any angina symptoms  Gastrointestinal: Negative for abdominal distention or pain, constipation, diarrhea and nausea  Endocrine: Negative for cold intolerance, heat intolerance, polydipsia and polyuria  Genitourinary:                Negative for  dysuria, flank pain  Tolerating Kemp without difficulty  Skin:   Negative for color change, pallor and rash  Neurological:   Negative for dizziness, tremors, seizures, syncope, facial asymmetry, speech difficulty, weakness, light-headedness, numbness and headaches  Patient has a remote history of having TIA  He indicates that he had some infection in his carotid 1 time and was immediately sent to [place] for treatment  Hematological:  Musculoskeletal:  Psychiatric:  No h/o spontaneous bruising/bleeding  Joint pains as above  Negative for agitation, behavioral problems, confusion, decreased concentration, dysphoric mood and sleep disturbance  A complete system-based review of systems as discussed with patient is otherwise negative      PAST MEDICAL HISTORY:  Past Medical History:   Diagnosis Date    Coronary artery disease     hx cabg x4 2007    Hearing loss     Hyperlipidemia     Hypertension     Hypothyroid     OA (osteoarthritis)     left knee    Prediabetes     S/P AVR     2007    Wears dentures     full upper    Wears glasses      Past Surgical History:   Procedure Laterality Date    AORTIC VALVE REPLACEMENT      BACK SURGERY      lumbar     CATARACT EXTRACTION Bilateral     CORONARY ARTERY BYPASS GRAFT      x4    2007    KNEE ARTHROSCOPY Bilateral     SHOULDER ARTHROSCOPY Right        FAMILY HISTORY:  Non-contributory    SOCIAL HISTORY:  History   Alcohol Use No     History   Drug Use No     History   Smoking Status    Never Smoker   Smokeless Tobacco    Never Used       ALLERGIES:  Allergies   Allergen Reactions    Percocet [Oxycodone-Acetaminophen] Itching     Throat swelling       MEDICATIONS:  All current active medications have been reviewed    Current Facility-Administered Medications   Medication Dose Route Frequency Provider Last Rate Last Dose    acetaminophen (TYLENOL) tablet 650 mg  650 mg Oral Q6H PRN Nicolas Cam, PA-C        aspirin tablet 325 mg  325 mg Oral BID Nicolas Cam, PA-C   325 mg at 01/17/18 1825    ceFAZolin (ANCEF) IVPB (premix) 1,000 mg  1,000 mg Intravenous Q8H Nicolas Krishna, PA-C        dexamethasone (DECADRON) injection 4 mg  4 mg Intravenous Once PRN Delphy Defalcis, DO        diphenhydrAMINE (BENADRYL) injection 25 mg  25 mg Intravenous Q6H PRN Delphy Defalcis, DO        docusate sodium (COLACE) capsule 100 mg  100 mg Oral BID Nicolas Krishna PA-C   100 mg at 01/17/18 1825    [START ON 1/18/2018] ferrous sulfate tablet 325 mg  325 mg Oral Daily With Breakfast Nicolas Krishna PA-C        [START ON 1/18/2018] fish oil capsule 1,000 mg  1,000 mg Oral BID Stevphen Gitelman, MD        [START ON 1/18/2018] HYDROcodone-acetaminophen (NORCO) 5-325 mg per tablet 1 tablet  1 tablet Oral Q6H PRN Nicolas Krishna PA-C        [START ON 1/18/2018] HYDROmorphone (DILAUDID) injection 0 5 mg  0 5 mg Intravenous Q2H PRN Nicolas Krishna, PA-C        ketorolac (TORADOL) injection 15 mg  15 mg Intravenous Q6H PRN Nicolas Krishna, PA-C        lactated ringers infusion  100 mL/hr Intravenous Continuous Belle Medrano PA-C 100 mL/hr at 18 1631 100 mL/hr at 18 1631    [START ON 2018] levothyroxine tablet 25 mcg  25 mcg Oral Daily Adam Castleman, MD        [START ON 2018] lisinopril (ZESTRIL) tablet 10 mg  10 mg Oral Daily Adam Castleman, MD        [START ON 2018] magnesium oxide (MAG-OX) tablet 400 mg  400 mg Oral Daily Adam Castleman, MD        metoclopramide (REGLAN) injection 5 mg  5 mg Intravenous Q6H PRN Delphy Defalcis, DO        ondansetron (ZOFRAN) injection 4 mg  4 mg Intravenous Q8H PRN Belle Medrano PA-C        ondansetron [facility]) injection 4 mg  4 mg Intravenous Q4H PRN Delphy Defalcis, DO        pravastatin (PRAVACHOL) tablet 10 mg  10 mg Oral Daily With Blanquita Lorenz MD        [START ON 2018] senna (SENOKOT) tablet 8 6 mg  1 tablet Oral Daily Belle Medrano PA-C        sodium chloride 0 9 % infusion  100 mL/hr Intravenous Continuous Barbara Johnson MD        sodium chloride 0 9 % infusion  125 mL/hr Intravenous Continuous Nery Kent DO   Stopped at 18 1241       Prescriptions Prior to Admission   Medication    aspirin (ECOTRIN) 325 mg EC tablet    ibuprofen (MOTRIN) 200 mg tablet    levothyroxine 25 mcg tablet    lisinopril (ZESTRIL) 10 mg tablet    Magnesium 400 MG CAPS    Multiple Vitamin (MULTIVITAMIN) capsule    Omega-3 Fatty Acids (FISH OIL) 1200 MG CAPS    simvastatin (ZOCOR) 40 mg tablet           PHYSICAL EXAM:    Vitals:  HR:  [50-80] 80  Resp:  [10-20] 18  BP: ()/(54-81) 93/67  SpO2:  [94 %-97 %] 97 %  Temp (24hrs), Av °F (36 1 °C), Min:96 1 °F (35 6 °C), Max:98 °F (36 7 °C)  Current: Temperature: (!) 96 5 °F (35 8 °C)  Body mass index is 31 04 kg/m²  General Appearance:    Awake, alert, cooperative, no distress, appears of stated age  Head:    Normocephalic, without obvious abnormality, atraumatic   Eyes:    Pupils equal in size,conjunctiva/corneas clear, EOM's intact     Ears:    External ear no drainage or redness  Nose:   No epistaxis/ discharge from nares  Throat:   Lips, mucosa, and tongue normal    Neck:   Supple, symmetrical, trachea midline, no JVP  Lungs:     Bilateral air entry is broncho-alveolar and equal        No crepitation or rales  No pleural rub  Heart:    Regular rate and rhythm, S1S2 normal, II/VI ES murmur LV   apex radiating to mid sternal area, no rub  Abdomen:     Soft, non-tender, no masses, no organomegaly   Genitalia:     + Kemp catheter  Clear urine +, No hematuria  Musculoskeletal:  No edema  Surgical site on the operated left knee has clear bulky Ace wrap dressing / no bleeding or hemorrhage apparent  Vascular:   Bilateral lower extremity 2+ in Posterior tibialis / dorsalis pedis pulsations  Skin:   Skin color, texture, turgor normal, no rashes or lesions   Neurologic:   Awake/ facial symmetry maintained  Speech is intact  Muscle bulk and strength is equivocal in B/L Upper and lower extremities except on operated left lower limb  Light sensation is intact B/l LE  B/L [place] flexion is WNL  LABS, IMAGING, & OTHER STUDIES:  Lab Results:  I have personally reviewed pertinent labs  Lab Results   Component Value Date     12/19/2017     12/19/2017    CO2 26 12/19/2017    ANIONGAP 8 12/19/2017    BUN 19 12/19/2017    CREATININE 1 05 12/19/2017    EGFR 69 12/19/2017    CALCIUM 9 3 12/19/2017    AST 25 12/19/2017    ALT 40 12/19/2017    ALKPHOS 55 12/19/2017    PROT 7 4 12/19/2017    ALBUMIN 3 9 12/19/2017    BILITOT 0 52 12/19/2017     Lab Results   Component Value Date    WBC 5 14 12/19/2017    HGB 14 3 12/19/2017     12/19/2017       Lab Results   Component Value Date    INR 1 00 12/19/2017         Imaging Studies:   I have personally reviewed pertinent imaging study reports       Imaging:     Xr Chest Pa & Lateral    Result Date: 12/20/2017  Narrative: CHEST - DUAL ENERGY INDICATION:  Aortic valve replacement; multiple coronary bypasses  COMPARISON: None VIEWS:  PA (including soft tissue/bone algorithms) and lateral projections IMAGES:  5 FINDINGS:  There are median sternotomy wires indicating prior cardiac surgery  The heart is normal in size  A prosthetic aortic valve is seen in standard position  The lungs are clear of active disease  Prominent nipple shadows  The visualized osseous structures appear within normal limits for the patient's age  There is a lower thoracic scoliosis convex to the right; degenerative arthritis is seen in the mid and lower thoracic spine  Impression: No active disease is seen in the chest  PERFORMED, DICTATED AND SIGNED BY:  Sherly Mosqueda MD Workstation performed: [id number]       EKG, Pathology, and Other Studies:   I have personally reviewed pertinent reports  Patient's outpatient cardiology workup from [facility] team was noted  Reports of echocardiogram and stress test apparent in his paperwork was noted  Portions of the record may have been created with voice recognition software  Occasional wrong word or "sound a like" substitutions may have occurred due to the inherent limitations of voice recognition software  Read the chart carefully and recognize, using context, where substitutions have occurred

## 2018-04-03 ENCOUNTER — OFFICE VISIT (OUTPATIENT)
Dept: PHYSICAL THERAPY | Facility: CLINIC | Age: 78
End: 2018-04-03
Payer: MEDICARE

## 2018-04-03 DIAGNOSIS — Z96.652 S/P TKR (TOTAL KNEE REPLACEMENT), LEFT: Primary | ICD-10-CM

## 2018-04-03 DIAGNOSIS — M17.12 OSTEOARTHROSIS, LOCALIZED, PRIMARY, KNEE, LEFT: ICD-10-CM

## 2018-04-03 PROCEDURE — 97110 THERAPEUTIC EXERCISES: CPT

## 2018-04-03 PROCEDURE — 97140 MANUAL THERAPY 1/> REGIONS: CPT

## 2018-04-03 NOTE — LETTER
2018    Stephanie Yuen MD  [facility]    Patient: Ruby Matute   YOB: 1940   Date of Visit: 4/3/2018     Encounter Diagnosis     ICD-10-CM    1  S/P TKR (total knee replacement), left Z96 652    2  Osteoarthrosis, localized, primary, knee, left M17 12        Dear Dr Betito Pop:    Please review the attached Plan of Care from Saida Branham 71 recent visit  Please verify that you agree therapy should continue by signing the attached document and sending it back to our office  If you have any questions or concerns, please don't hesitate to call  Sincerely,    Corine George PT      Referring Provider:      I certify that I have read the below Plan of Care and certify the need for these services furnished under this plan of treatment while under my care  Stephanie Yuen MD  [facility] 31: [phone]          Daily Note     Today's date: 2018  Patient name: Ruby Matute  : 1940  MRN: [id number]  Referring provider: Aria Oliveros MD  Dx:   Encounter Diagnosis     ICD-10-CM    1   S/P TKR (total knee replacement), left I23 416                   Subject: "I felt good after I left here yesterday, 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 "      Objective: See treatment diary below  Precautions L TKR    Specialty Daily Treatment Diary     Manual  4/3       STM 5       DTM 5       PROM 5       TPR                    Exercise Diary  4/3       Balance Board 30x       T/G Squats/PF 30x       WP-Hip ABD/ADD&Flex/Ext 30#, 30xea       Trimax-TKE 30x       Saul-PF stretch 2min       Treadmill 20min       Bike 10min       NK Table ex/stretch 10#, 30xea 10min,ea                                                                                                           Modalities 4/3       MH 20min       IFC 20min                         Assessment: Tolerated treatment well  Patient exhibited good technique with therapeutic exercises and would benefit from continued PT      Plan: Continue per plan of care  Progress treatment as tolerated  PT Re-Evaluation     Today's date: 2018  Patient name: Porsha Johnson  : 1940  MRN: [id number]  Referring provider: Danika Francis MD  Dx:   Encounter Diagnosis     ICD-10-CM    1  S/P TKR (total knee replacement), left Z96 652        Start Time: 0800  Stop Time: 1000  Total time in clinic (min): 120 minutes    Assessment    Assessment details: Patient presents with total left knee rehab and fell and sprained / aggravated his left knee  Understanding of Dx/Px/POC: excellent  Goals  STG-Decrease pain levels  STG- Increase ROM  LTG- Return to work full time  LTG- No more pain with use  Plan  Planned modality interventions: unattended electrical stimulation and hydrotherapy  Planned therapy interventions: balance, body mechanics training, compression, coordination, flexibility, functional ROM exercises, gait training, manual therapy, patient education, postural training, strengthening, stretching, therapeutic activities, therapeutic exercise, therapeutic training, transfer training, graded exercise and home exercise program  Frequency: 3x week  Duration in weeks: 6  Treatment plan discussed with: patient        Subjective Evaluation    History of Present Illness  Date of onset: 3/14/2018  Date of surgery: 2018  Mechanism of injury: surgery  Mechanism of injury: Total Knee with patient spraining himself while on the slippery driveway after his total knee surgery  Quality of life: excellent    Pain  Current pain ratin  At best pain ratin  At worst pain ratin  Location: Left knee and rest of his left leg    Quality: cramping, pressure, discomfort and pulling  Relieving factors: change in position, relaxation, rest and heat  Aggravating factors: standing, walking, stair climbing and running  Progression: improved    Treatments  Previous treatment: physical therapy  Current treatment: physical therapy  Patient Goals  Patient goals for therapy: decreased pain, improved balance, increased motion, independence with ADLs/IADLs, return to sport/leisure activities, return to work and increased strength  Patient goal: increase range of motion        Objective     Static Posture     Knee   Knee (Left): Flexed  Comments  Left knee does not fully extend with standing or walking  Palpation   Left   Muscle spasm in the distal biceps femoris, distal semimembranosus, distal semitendinosus, lateral gastrocnemius, medial gastrocnemius and rectus femoris  Tenderness of the distal biceps femoris, distal semimembranosus, distal semitendinosus, lateral gastrocnemius, medial gastrocnemius and rectus femoris  Tenderness   Left Knee   Tenderness in the inferior patella, ITB, LCL (distal) and LCL (proximal)  Active Range of Motion   Left Knee   Flexion: 107 degrees   Extension: 4 degrees     Passive Range of Motion   Left Knee   Flexion: 107 degrees   Extension: 4 degrees     Right Knee   Flexion: 146 degrees   Extension: 0 degrees     Strength/Myotome Testing     Left Knee   Left knee flexion strength: 5/10  15 lbs  Left knee extension strength: 5/10   12 lbs  Quadriceps contraction: good    Right Knee   Right knee flexion strength: 0/10   48 lbs  Right knee extension strength: 0/10   49 lbs  Quadriceps contraction: good    Additional Strength Details  strength is measured with pain scale and in pounds  Ambulation     Ambulation: Level Surfaces   Ambulation with assistive device: independent  Ambulation without assistive device: independent    Ambulation: Stairs   Ascend stairs: independent  Pattern: non-reciprocal  Railings: one rail  Descend stairs: independent  Pattern: creeps down    Observational Gait   Stride length within functional limits

## 2018-04-04 ENCOUNTER — OFFICE VISIT (OUTPATIENT)
Dept: PHYSICAL THERAPY | Facility: CLINIC | Age: 78
End: 2018-04-04
Payer: MEDICARE

## 2018-04-04 DIAGNOSIS — M17.12 OSTEOARTHROSIS, LOCALIZED, PRIMARY, KNEE, LEFT: ICD-10-CM

## 2018-04-04 DIAGNOSIS — Z96.652 S/P TKR (TOTAL KNEE REPLACEMENT), LEFT: Primary | ICD-10-CM

## 2018-04-04 PROCEDURE — 97014 ELECTRIC STIMULATION THERAPY: CPT

## 2018-04-04 NOTE — PROGRESS NOTES
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 "      Objective: See treatment diary below  Precautions L TKR    Specialty Daily Treatment Diary     Manual  4/3       STM 5       DTM 5       PROM 5       TPR                    Exercise Diary  4/3       Balance Board 30x       T/G Squats/PF 30x       WP-Hip ABD/ADD&Flex/Ext 30#, 30xea       Trimax-TKE 30x       Saul-PF stretch 2min       Treadmill 20min       Bike 10min       NK Table ex/stretch 10#, 30xea 10min,ea                                                                                                           Modalities 4/3       MH 20min       IFC 20min                         Assessment: Tolerated treatment well  Patient exhibited good technique with therapeutic exercises and would benefit from continued PT      Plan: Continue per plan of care  Progress treatment as tolerated

## 2018-04-05 ENCOUNTER — APPOINTMENT (OUTPATIENT)
Dept: PHYSICAL THERAPY | Facility: CLINIC | Age: 78
End: 2018-04-05
Payer: MEDICARE

## 2018-04-05 NOTE — PROGRESS NOTES
PT Re-Evaluation     Today's date: 2018  Patient name: Porsha Johnson  : 1940  MRN: [id number]  Referring provider: Danika Francis MD  Dx:   Encounter Diagnosis     ICD-10-CM    1  S/P TKR (total knee replacement), left Z96 652        Start Time: 0800  Stop Time: 1000  Total time in clinic (min): 120 minutes    Assessment    Assessment details: Patient presents with total left knee rehab and fell and sprained / aggravated his left knee  Understanding of Dx/Px/POC: excellent  Goals  STG-Decrease pain levels  STG- Increase ROM  LTG- Return to work full time  LTG- No more pain with use  Plan  Planned modality interventions: unattended electrical stimulation and hydrotherapy  Planned therapy interventions: balance, body mechanics training, compression, coordination, flexibility, functional ROM exercises, gait training, manual therapy, patient education, postural training, strengthening, stretching, therapeutic activities, therapeutic exercise, therapeutic training, transfer training, graded exercise and home exercise program  Frequency: 3x week  Duration in weeks: 6  Treatment plan discussed with: patient        Subjective Evaluation    History of Present Illness  Date of onset: 3/14/2018  Date of surgery: 2018  Mechanism of injury: surgery  Mechanism of injury: Total Knee with patient spraining himself while on the slippery driveway after his total knee surgery  Quality of life: excellent    Pain  Current pain ratin  At best pain ratin  At worst pain ratin  Location: Left knee and rest of his left leg    Quality: cramping, pressure, discomfort and pulling  Relieving factors: change in position, relaxation, rest and heat  Aggravating factors: standing, walking, stair climbing and running  Progression: improved    Treatments  Previous treatment: physical therapy  Current treatment: physical therapy  Patient Goals  Patient goals for therapy: decreased pain, improved balance, increased motion, independence with ADLs/IADLs, return to sport/leisure activities, return to work and increased strength  Patient goal: increase range of motion        Objective     Static Posture     Knee   Knee (Left): Flexed  Comments  Left knee does not fully extend with standing or walking  Palpation   Left   Muscle spasm in the distal biceps femoris, distal semimembranosus, distal semitendinosus, lateral gastrocnemius, medial gastrocnemius and rectus femoris  Tenderness of the distal biceps femoris, distal semimembranosus, distal semitendinosus, lateral gastrocnemius, medial gastrocnemius and rectus femoris  Tenderness   Left Knee   Tenderness in the inferior patella, ITB, LCL (distal) and LCL (proximal)  Active Range of Motion   Left Knee   Flexion: 107 degrees   Extension: 4 degrees     Passive Range of Motion   Left Knee   Flexion: 107 degrees   Extension: 4 degrees     Right Knee   Flexion: 146 degrees   Extension: 0 degrees     Strength/Myotome Testing     Left Knee   Left knee flexion strength: 5/10  15 lbs  Left knee extension strength: 5/10   12 lbs  Quadriceps contraction: good    Right Knee   Right knee flexion strength: 0/10   48 lbs  Right knee extension strength: 0/10   49 lbs  Quadriceps contraction: good    Additional Strength Details  strength is measured with pain scale and in pounds  Ambulation     Ambulation: Level Surfaces   Ambulation with assistive device: independent  Ambulation without assistive device: independent    Ambulation: Stairs   Ascend stairs: independent  Pattern: non-reciprocal  Railings: one rail  Descend stairs: independent  Pattern: creeps down    Observational Gait   Stride length within functional limits

## 2018-04-06 ENCOUNTER — OFFICE VISIT (OUTPATIENT)
Dept: PHYSICAL THERAPY | Facility: CLINIC | Age: 78
End: 2018-04-06
Payer: MEDICARE

## 2018-04-06 DIAGNOSIS — Z96.652 S/P TKR (TOTAL KNEE REPLACEMENT), LEFT: Primary | ICD-10-CM

## 2018-04-06 PROCEDURE — 97140 MANUAL THERAPY 1/> REGIONS: CPT

## 2018-04-06 PROCEDURE — 97110 THERAPEUTIC EXERCISES: CPT

## 2018-04-06 PROCEDURE — 97014 ELECTRIC STIMULATION THERAPY: CPT

## 2018-04-06 NOTE — PROGRESS NOTES
Daily Note     Today's date: 2018  Patient name: Nathan Huddleston  : 1940  MRN: [id number]  Referring provider: Rin Bagley MD  Dx:   Encounter Diagnosis     ICD-10-CM    1  S/P TKR (total knee replacement), left Z96 652                   Subjective: No new c/o pain today  Objective: See treatment diary below  Precautions L TKR    Specialty Daily Treatment Diary     Manual  4/3 4/6      STM 5 5min      DTM 5       PROM 5 5min      TPR  5min                  Exercise Diary  4/3 4/6      Balance Board 30x 30x      T/G Squats/PF 30x 30x      WP-Hip ABD/ADD&Flex/Ext 30#, 30xea 30x      Trimax-TKE 30x 30x      Saul-PF stretch 2min 2min      Treadmill 20min 20min      Bike 10min 10min      NK Table ex/stretch 10#, 30xea 10min,ea 30x                                                                                                          Modalities 4/3 4/6      MH 20min 20min      IFC 20min 20min                        Assessment: Tolerated treatment well  Patient exhibited good technique with therapeutic exercises and would benefit from continued PT      Plan: Continue per plan of care  Progress treatment as tolerated

## 2018-04-09 ENCOUNTER — OFFICE VISIT (OUTPATIENT)
Dept: PHYSICAL THERAPY | Facility: CLINIC | Age: 78
End: 2018-04-09
Payer: MEDICARE

## 2018-04-09 DIAGNOSIS — Z96.652 S/P TKR (TOTAL KNEE REPLACEMENT), LEFT: Primary | ICD-10-CM

## 2018-04-09 PROCEDURE — 97140 MANUAL THERAPY 1/> REGIONS: CPT

## 2018-04-09 PROCEDURE — 97014 ELECTRIC STIMULATION THERAPY: CPT

## 2018-04-09 PROCEDURE — 97150 GROUP THERAPEUTIC PROCEDURES: CPT

## 2018-04-09 NOTE — PROGRESS NOTES
Daily Note     Today's date: 2018  Patient name: Tawny Berrios  : 1940  MRN: [id number]  Referring provider: Piper Barnhart MD  Dx:   Encounter Diagnosis     ICD-10-CM    1  S/P TKR (total knee replacement), left Z96 652                   Subjective: No new c/o pain today  Objective: See treatment diary below  Precautions L TKR    Specialty Daily Treatment Diary     Manual  4/3 4/6 4/9     STM 5 5min 5min     DTM 5       PROM 5 5min 5min     TPR  5min 5min                 Exercise Diary  4/3 4/6 4/9     Balance Board 30x 30x 30x     T/G Squats/PF 30x 30x 30x     WP-Hip ABD/ADD&Flex/Ext 30#, 30xea 30x 30x     Trimax-TKE 30x 30x 30x     Saul-PF stretch 2min 2min 2min     Treadmill 20min 20min 20min     Bike 10min 10min 10min     NK Table ex/stretch 10#, 30xea 10min,ea 30x 30x                                                                                                         Modalities 4/3 4/6 4/9     MH 20min 20min 20min     IFC 20min 20min 20min                     Assessment: Tolerated treatment well  Patient exhibited good technique with therapeutic exercises and would benefit from continued PT      Plan: Continue per plan of care  Progress treatment as tolerated

## 2018-04-09 NOTE — PROGRESS NOTES
Today's date: 2018  Patient name: Tim Ware  : 1940  MRN: [id number]  Referring provider: Sebastian Flores MD  Dx: No diagnosis found  Assessment/Plan    Subjective    Objective      Note opened in error

## 2018-04-10 ENCOUNTER — OFFICE VISIT (OUTPATIENT)
Dept: PHYSICAL THERAPY | Facility: CLINIC | Age: 78
End: 2018-04-10
Payer: MEDICARE

## 2018-04-10 DIAGNOSIS — Z96.652 S/P TKR (TOTAL KNEE REPLACEMENT), LEFT: Primary | ICD-10-CM

## 2018-04-10 DIAGNOSIS — M17.12 OSTEOARTHROSIS, LOCALIZED, PRIMARY, KNEE, LEFT: ICD-10-CM

## 2018-04-10 PROCEDURE — 97014 ELECTRIC STIMULATION THERAPY: CPT | Performed by: PHYSICAL THERAPIST

## 2018-04-10 PROCEDURE — 97140 MANUAL THERAPY 1/> REGIONS: CPT | Performed by: PHYSICAL THERAPIST

## 2018-04-10 PROCEDURE — 97110 THERAPEUTIC EXERCISES: CPT | Performed by: PHYSICAL THERAPIST

## 2018-04-10 NOTE — PROGRESS NOTES
Daily Note     Today's date: 4/10/2018  Patient name: Yuniel Webber  : 1940  MRN: [id number]  Referring provider: Alaina Romo MD  Dx: No diagnosis found  Subjective:  Patient states he is feeling real good  His strength and endurance have really improved  Objective: See treatment diary below      Assessment: Tolerated treatment well  Patient would benefit from continued PT      Plan: Progress treatment as tolerated          Manual  4/3 4/6 4/9 4/10    STM 5 5min 5min 5 min    DTM 5   3 min    PROM 5 5min 5min 5 min    TPR  5min 5min 5 min                Exercise Diary  4/3 4/6 4/9 4/10    Balance Board 30x 30x 30x 30x    T/G Squats/PF 30x 30x 30x 30x    WP-Hip ABD/ADD&Flex/Ext 30#, 30xea 30x 30x 30x    Trimax-TKE 30x 30x 30x 30x    Saul-PF stretch 2min 2min 2min 2 min    Treadmill 20min 20min 20min 20 min    Bike 10min 10min 10min     NK Table ex/stretch 10#, 30xea 10min,ea 30x 30x 12 5 lbs 10 min   12 5  30x                        Modalities 4/3 4/6 4/9 4/10    MH 20min 20min 20min 20 min    IFC 20min 20min 20min 20 min

## 2018-04-11 ENCOUNTER — APPOINTMENT (OUTPATIENT)
Dept: PHYSICAL THERAPY | Facility: CLINIC | Age: 78
End: 2018-04-11
Payer: MEDICARE

## 2018-04-13 ENCOUNTER — OFFICE VISIT (OUTPATIENT)
Dept: PHYSICAL THERAPY | Facility: CLINIC | Age: 78
End: 2018-04-13
Payer: MEDICARE

## 2018-04-13 DIAGNOSIS — Z96.652 S/P TKR (TOTAL KNEE REPLACEMENT), LEFT: Primary | ICD-10-CM

## 2018-04-13 DIAGNOSIS — M17.12 OSTEOARTHROSIS, LOCALIZED, PRIMARY, KNEE, LEFT: ICD-10-CM

## 2018-04-13 PROCEDURE — 97140 MANUAL THERAPY 1/> REGIONS: CPT | Performed by: PHYSICAL THERAPIST

## 2018-04-13 PROCEDURE — G8979 MOBILITY GOAL STATUS: HCPCS | Performed by: PHYSICAL THERAPIST

## 2018-04-13 PROCEDURE — 97164 PT RE-EVAL EST PLAN CARE: CPT | Performed by: PHYSICAL THERAPIST

## 2018-04-13 PROCEDURE — G8978 MOBILITY CURRENT STATUS: HCPCS | Performed by: PHYSICAL THERAPIST

## 2018-04-13 PROCEDURE — 97110 THERAPEUTIC EXERCISES: CPT | Performed by: PHYSICAL THERAPIST

## 2018-04-13 PROCEDURE — 97014 ELECTRIC STIMULATION THERAPY: CPT | Performed by: PHYSICAL THERAPIST

## 2018-04-13 NOTE — PROGRESS NOTES
Daily Note     Today's date: 2018  Patient name: Apurva Ovalle  : 1940  MRN: [id number]  Referring provider: Jaden Richards MD  Dx:   Encounter Diagnosis     ICD-10-CM    1  S/P TKR (total knee replacement), left Z96 652    2  Osteoarthrosis, localized, primary, knee, left M17 12        Start Time: 0730  Stop Time: 0930  Total time in clinic (min): 120 minutes    Subjective: No new c/o pain today  "the doctor was pleased with my knee  Today is my last day here at therapy!"      Objective: See treatment diary below      Assessment: Tolerated treatment well  Patient exhibited good technique with therapeutic exercises and would benefit from continued PT      Plan: Continue per plan of care  Progress treatment as tolerated      Manual  4/3 4/6 4/9 4/10 4/13   STM 5 5min 5min 5 min 5'   DTM 5   3 min 3'   PROM 5 5min 5min 5 min 5'   TPR  5min 5min 5 min 5'               Exercise Diary  4/3 4/6 4/9 4/10 4/13   Balance Board 30x 30x 30x 30x 30x   T/G Squats/PF 30x 30x 30x 30x 30x   WP-Hip ABD/ADD&Flex/Ext 30#, 30xea 30x 30x 30x 30#-30x   Trimax-TKE 30x 30x 30x 30x 30x   Saul-PF stretch 2min 2min 2min 2 min 2min   Treadmill 20min 20min 20min 20 min    Bike 10min 10min 10min  20'   NK Table ex/stretch 10#, 30xea 10min,ea 30x 30x 12 5 lbs 10 min   12 5  30x 15#-10'x2                       Modalities 4/3 4/6 4/9 4/10 4/13   MH 20min 20min 20min 20 min 20'   IFC 20min 20min 20min 20 min 20'

## 2018-04-13 NOTE — LETTER
2018    Teresa Oconnell MD  [facility]    Patient: Farheen Voss   YOB: 1940   Date of Visit: 2018     Encounter Diagnosis     ICD-10-CM    1  S/P TKR (total knee replacement), left Z96 652    2  Osteoarthrosis, localized, primary, knee, left M17 12        Dear Dr Emmanuel Love:    Please review the attached Plan of Care from [facility] recent visit  Please verify that you agree therapy should continue by signing the attached document and sending it back to our office  If you have any questions or concerns, please don't hesitate to call  Sincerely,    Angelica Obrien PT      Referring Provider:      I certify that I have read the below Plan of Care and certify the need for these services furnished under this plan of treatment while under my care  Teresa Oconnell MD  [facility]: [phone]          Daily Note     Today's date: 2018  Patient name: Farheen Voss  : 1940  MRN: [id number]  Referring provider: Marcello Jung MD  Dx:   Encounter Diagnosis     ICD-10-CM    1  S/P TKR (total knee replacement), left Z96 652    2  Osteoarthrosis, localized, primary, knee, left M17 12        Start Time: 0730  Stop Time: 930  Total time in clinic (min): 120 minutes    Subjective: No new c/o pain today  "the doctor was pleased with my knee  Today is my last day here at therapy!"      Objective: See treatment diary below      Assessment: Tolerated treatment well  Patient exhibited good technique with therapeutic exercises and would benefit from continued PT      Plan: Continue per plan of care  Progress treatment as tolerated      Manual  4/3 4/6 4/9 4/10 4/13   STM 5 5min 5min 5 min 5'   DTM 5   3 min 3'   PROM 5 5min 5min 5 min 5'   TPR  5min 5min 5 min 5'               Exercise Diary  4/3 4/6 4/9 4/10 4/13   Balance Board 30x 30x 30x 30x 30x   T/G Squats/PF 30x 30x 30x 30x 30x   WP-Hip ABD/ADD&Flex/Ext 30#, 30xea 30x 30x 30x 30#-30x   Trimax-TKE 30x 30x 30x 30x 30x   Saul-PF stretch 2min 2min 2min 2 min 2min   Treadmill 20min 20min 20min 20 min    Bike 10min 10min 10min  20'   NK Table ex/stretch 10#, 30xea 10min,ea 30x 30x 12 5 lbs 10 min   12 5  30x 15#-10'x2                       Modalities 4/3 4/6 4/9 4/10 4/13   MH 20min 20min 20min 20 min 20'   IFC 20min 20min 20min 20 min 20'                       PT Re-Evaluation     Today's date: 2018  Patient name: Ruby Matute  : 1940  MRN: [id number]  Referring provider: Aria Oliveros MD  Dx:   Encounter Diagnosis     ICD-10-CM    1  S/P TKR (total knee replacement), left Z96 652    2  Osteoarthrosis, localized, primary, knee, left M17 12        Start Time: 07  Stop Time: 930  Total time in clinic (min): 120 minutes    Assessment    Assessment details: Patient presents with total left knee rehab and fell and sprained / aggravated his left knee  Understanding of Dx/Px/POC: excellent  Goals  STG-Decrease pain levels  STG- Increase ROM  LTG- Return to work full time  LTG- No more pain with use  Plan  Patient is discharged at this time  Treatment plan discussed with: patient        Subjective Evaluation    History of Present Illness  Date of onset: 3/14/2018  Date of surgery: 2018  Mechanism of injury: surgery  Mechanism of injury: Total Knee with patient spraining himself while on the slippery driveway after his total knee surgery  Quality of life: excellent    Pain  Current pain ratin-2  At best pain ratin  At worst pain ratin  Location: Left knee and rest of his left leg    Quality: cramping, pressure, discomfort and pulling  Relieving factors: change in position, relaxation, rest and heat  Aggravating factors: standing, walking, stair climbing and running  Progression: improved    Treatments  Previous treatment: physical therapy  Current treatment: physical therapy  Patient Goals  Patient goals for therapy: decreased pain, improved balance, increased motion, independence with ADLs/IADLs, return to sport/leisure activities, return to work and increased strength  Patient goal: increase range of motion        Objective     Static Posture     Knee   Knee (Left): Flexed  Comments  Left knee does not fully extend with standing or walking  Still limited but almost at full extension  Palpation   Left   Muscle spasm in the distal biceps femoris, distal semimembranosus, distal semitendinosus, lateral gastrocnemius, medial gastrocnemius and rectus femoris  Tenderness of the distal biceps femoris, distal semimembranosus, distal semitendinosus, lateral gastrocnemius, medial gastrocnemius and rectus femoris  Tenderness   Left Knee   Tenderness in the inferior patella, ITB, LCL (distal) and LCL (proximal)  Active Range of Motion   Left Knee   Flexion: 107 degrees   Extension: 4 degrees     Passive Range of Motion   Left Knee   Flexion: 107 degrees   Extension: 4 degrees     Right Knee   Flexion: 146 degrees   Extension: 0 degrees     Strength/Myotome Testing     Left Knee   Left knee flexion strength: 5/10  15 lbs  Left knee extension strength: 5/10   12 lbs  Quadriceps contraction: good    Right Knee   Right knee flexion strength: 0/10   48 lbs  Right knee extension strength: 0/10   49 lbs  Quadriceps contraction: good    Additional Strength Details  strength is measured with pain scale and in pounds  Ambulation     Ambulation: Level Surfaces   Ambulation with assistive device: independent  Ambulation without assistive device: independent    Ambulation: Stairs   Ascend stairs: independent  Pattern: non-reciprocal  Railings: one rail  Descend stairs: independent  Pattern: creeps down    Observational Gait   Stride length within functional limits         Start Time: 0730  Stop Time: 0930  Total time in clinic (min): 120 minutes    Assessment    Assessment details: L TKR Sx  Understanding of Dx/Px/POC: excellent  Goals  Discharge    Plan  Plan details: Discharge        Subjective Evaluation    History of Present Illness  Date of onset: 3/14/2018  Mechanism of injury: L TKR Sx  Quality of life: excellent    Pain  Current pain ratin  At best pain ratin  At worst pain ratin  Quality: dull ache  Aggravating factors: stair climbing, standing and walking    Treatments  Current treatment: physical therapy  Patient Goals  Patient goal: Discharge        Objective     Observations   Left Knee   Negative for incision  Additional Observation Details  Looks good   Minimal swelling  Tenderness   Left Knee   No tenderness in the lateral patella, LCL (proximal), MCL (distal) and patellar tendon  Active Range of Motion   Left Knee   Flexion: 130 degrees   Extension: 3 degrees     Right Knee   Normal active range of motion  Flexion: 140 degrees   Extension: 0 degrees     Additional Active Range of Motion Details  Still needs a little more extension  Passive Range of Motion   Left Knee   Flexion: 127 degrees   Extension: 3 degrees     Right Knee   Flexion: 140 degrees   Extension: 0 degrees     Strength/Myotome Testing     Left Knee   Normal strength    Right Knee   Normal strength    Additional Strength Details  Still a little weaker on the left than the right but doing well  General Comments     Knee Comments  Patient states he is doing well enough and is now discharged  Precautions:     Daily Treatment Diary       PT Discharge    Today's date: 2018  Patient name: Yamilka Case  : 1940  MRN: [id number]  Referring provider: Paul Todd MD  Dx:   Encounter Diagnosis     ICD-10-CM    1  S/P TKR (total knee replacement), left Z96 652    2  Osteoarthrosis, localized, primary, knee, left M17 12        Start Time: 0730  Stop Time: 0930  Total time in clinic (min): 120 minutes    Assessment/Plan    Subjective    Objective        Precautions: Patient has not returned  Patient is discharged at this time  Daily Treatment Diary   No treatment provided today

## 2018-04-13 NOTE — PROGRESS NOTES
PT Re-Evaluation     Today's date: 2018  Patient name: Myron Partida  : 1940  MRN: [id number]  Referring provider: Jonnathan Barton MD  Dx:   Encounter Diagnosis     ICD-10-CM    1  S/P TKR (total knee replacement), left Z96 652    2  Osteoarthrosis, localized, primary, knee, left M17 12        Start Time: 0730  Stop Time: 0930  Total time in clinic (min): 120 minutes    Assessment    Assessment details: Patient presents with total left knee rehab and fell and sprained / aggravated his left knee  Understanding of Dx/Px/POC: excellent  Goals  STG-Decrease pain levels  STG- Increase ROM  LTG- Return to work full time  LTG- No more pain with use  Plan  Patient is discharged at this time  Treatment plan discussed with: patient        Subjective Evaluation    History of Present Illness  Date of onset: 3/14/2018  Date of surgery: 2018  Mechanism of injury: surgery  Mechanism of injury: Total Knee with patient spraining himself while on the slippery driveway after his total knee surgery  Quality of life: excellent    Pain  Current pain ratin-2  At best pain ratin  At worst pain ratin  Location: Left knee and rest of his left leg  Quality: cramping, pressure, discomfort and pulling  Relieving factors: change in position, relaxation, rest and heat  Aggravating factors: standing, walking, stair climbing and running  Progression: improved    Treatments  Previous treatment: physical therapy  Current treatment: physical therapy  Patient Goals  Patient goals for therapy: decreased pain, improved balance, increased motion, independence with ADLs/IADLs, return to sport/leisure activities, return to work and increased strength  Patient goal: increase range of motion        Objective     Static Posture     Knee   Knee (Left): Flexed  Comments  Left knee does not fully extend with standing or walking  Still limited but almost at full extension      Palpation   Left   Muscle spasm in the distal biceps femoris, distal semimembranosus, distal semitendinosus, lateral gastrocnemius, medial gastrocnemius and rectus femoris  Tenderness of the distal biceps femoris, distal semimembranosus, distal semitendinosus, lateral gastrocnemius, medial gastrocnemius and rectus femoris  Tenderness   Left Knee   Tenderness in the inferior patella, ITB, LCL (distal) and LCL (proximal)  Active Range of Motion   Left Knee   Flexion: 107 degrees   Extension: 4 degrees     Passive Range of Motion   Left Knee   Flexion: 107 degrees   Extension: 4 degrees     Right Knee   Flexion: 146 degrees   Extension: 0 degrees     Strength/Myotome Testing     Left Knee   Left knee flexion strength: 5/10  15 lbs  Left knee extension strength: 5/10   12 lbs  Quadriceps contraction: good    Right Knee   Right knee flexion strength: 0/10   48 lbs  Right knee extension strength: 0/10   49 lbs  Quadriceps contraction: good    Additional Strength Details  strength is measured with pain scale and in pounds  Ambulation     Ambulation: Level Surfaces   Ambulation with assistive device: independent  Ambulation without assistive device: independent    Ambulation: Stairs   Ascend stairs: independent  Pattern: non-reciprocal  Railings: one rail  Descend stairs: independent  Pattern: creeps down    Observational Gait   Stride length within functional limits         Start Time: 0730  Stop Time: 0930  Total time in clinic (min): 120 minutes    Assessment    Assessment details: L TKR Sx  Understanding of Dx/Px/POC: excellent  Goals  Discharge    Plan  Plan details: Discharge        Subjective Evaluation    History of Present Illness  Date of onset: 3/14/2018  Mechanism of injury: L TKR Sx  Quality of life: excellent    Pain  Current pain ratin  At best pain ratin  At worst pain ratin  Quality: dull ache  Aggravating factors: stair climbing, standing and walking    Treatments  Current treatment: physical therapy  Patient Goals  Patient goal: Discharge        Objective     Observations   Left Knee   Negative for incision  Additional Observation Details  Looks good   Minimal swelling  Tenderness   Left Knee   No tenderness in the lateral patella, LCL (proximal), MCL (distal) and patellar tendon  Active Range of Motion   Left Knee   Flexion: 130 degrees   Extension: 3 degrees     Right Knee   Normal active range of motion  Flexion: 140 degrees   Extension: 0 degrees     Additional Active Range of Motion Details  Still needs a little more extension  Passive Range of Motion   Left Knee   Flexion: 127 degrees   Extension: 3 degrees     Right Knee   Flexion: 140 degrees   Extension: 0 degrees     Strength/Myotome Testing     Left Knee   Normal strength    Right Knee   Normal strength    Additional Strength Details  Still a little weaker on the left than the right but doing well  General Comments     Knee Comments  Patient states he is doing well enough and is now discharged            Precautions:     Daily Treatment Diary

## 2018-04-13 NOTE — LETTER
2018    Joe Mcihael MD  [facility]    Patient: Mecca Gómez   YOB: 1940   Date of Visit: 2018     Encounter Diagnosis     ICD-10-CM    1  S/P TKR (total knee replacement), left Z96 652    2  Osteoarthrosis, localized, primary, knee, left M17 12        Dear Dr Lane Duong:    [facility]  Please verify that you agree therapy should continue by signing the attached document and sending it back to our office  If you have any questions or concerns, please don't hesitate to call  Sincerely,    Ryan Martinez, PT      Referring Provider:      I certify that I have read the below Plan of Care and certify the need for these services furnished under this plan of treatment while under my care  Joe Michael MD  [facility] 31: [phone]          Daily Note     Today's date: 2018  Patient name: Mecca Gómez  : 1940  MRN: [id number]  Referring provider: Daphnie Vigil MD  Dx:   Encounter Diagnosis     ICD-10-CM    1  S/P TKR (total knee replacement), left Z96 652    2  Osteoarthrosis, localized, primary, knee, left M17 12        Start Time: 0730  Stop Time: 930  Total time in clinic (min): 120 minutes    Subjective: No new c/o pain today  "the doctor was pleased with my knee  Today is my last day here at therapy!"      Objective: See treatment diary below      Assessment: Tolerated treatment well  Patient exhibited good technique with therapeutic exercises and would benefit from continued PT      Plan: Continue per plan of care  Progress treatment as tolerated      Manual  4/3 4/6 4/9 4/10 4/13   STM 5 5min 5min 5 min 5'   DTM 5   3 min 3'   PROM 5 5min 5min 5 min 5'   TPR  5min 5min 5 min 5'               Exercise Diary  4/3 4/6 4/9 4/10 4/13   Balance Board 30x 30x 30x 30x 30x   T/G Squats/PF 30x 30x 30x 30x 30x   WP-Hip ABD/ADD&Flex/Ext 30#, 30xea 30x 30x 30x 30#-30x   Trimax-TKE 30x 30x 30x 30x 30x   Saul-PF stretch 2min 2min 2min 2 min 2min   Treadmill 20min 20min 20min 20 min    Bike 10min 10min 10min  20'   NK Table ex/stretch 10#, 30xea 10min,ea 30x 30x 12 5 lbs 10 min   12 5  30x 15#-10'x2                       Modalities 4/3 4 4/9 4/10 4/13   MH 20min 20min 20min 20 min 20'   IFC 20min 20min 20min 20 min 20'                       PT Re-Evaluation     Today's date: 2018  Patient name: Lizzy Kong  : 1940  MRN: [id number]  Referring provider: Baldemar Gill MD  Dx:   Encounter Diagnosis     ICD-10-CM    1  S/P TKR (total knee replacement), left Z96 652    2  Osteoarthrosis, localized, primary, knee, left M17 12        Start Time: 0730  Stop Time: 30  Total time in clinic (min): 120 minutes    Assessment    Assessment details: Patient presents with total left knee rehab and fell and sprained / aggravated his left knee  Understanding of Dx/Px/POC: excellent  Goals  STG-Decrease pain levels  STG- Increase ROM  LTG- Return to work full time  LTG- No more pain with use  Plan  Patient is discharged at this time  Treatment plan discussed with: patient        Subjective Evaluation    History of Present Illness  Date of onset: 3/14/2018  Date of surgery: 2018  Mechanism of injury: surgery  Mechanism of injury: Total Knee with patient spraining himself while on the slippery driveway after his total knee surgery  Quality of life: excellent    Pain  Current pain ratin-2  At best pain ratin  At worst pain ratin  Location: Left knee and rest of his left leg    Quality: cramping, pressure, discomfort and pulling  Relieving factors: change in position, relaxation, rest and heat  Aggravating factors: standing, walking, stair climbing and running  Progression: improved    Treatments  Previous treatment: physical therapy  Current treatment: physical therapy  Patient Goals  Patient goals for therapy: decreased pain, improved balance, increased motion, independence with ADLs/IADLs, return to sport/leisure activities, return to work and increased strength  Patient goal: increase range of motion        Objective     Static Posture     Knee   Knee (Left): Flexed  Comments  Left knee does not fully extend with standing or walking  Still limited but almost at full extension  Palpation   Left   Muscle spasm in the distal biceps femoris, distal semimembranosus, distal semitendinosus, lateral gastrocnemius, medial gastrocnemius and rectus femoris  Tenderness of the distal biceps femoris, distal semimembranosus, distal semitendinosus, lateral gastrocnemius, medial gastrocnemius and rectus femoris  Tenderness   Left Knee   Tenderness in the inferior patella, ITB, LCL (distal) and LCL (proximal)  Active Range of Motion   Left Knee   Flexion: 107 degrees   Extension: 4 degrees     Passive Range of Motion   Left Knee   Flexion: 107 degrees   Extension: 4 degrees     Right Knee   Flexion: 146 degrees   Extension: 0 degrees     Strength/Myotome Testing     Left Knee   Left knee flexion strength: 5/10  15 lbs  Left knee extension strength: 5/10   12 lbs  Quadriceps contraction: good    Right Knee   Right knee flexion strength: 0/10   48 lbs  Right knee extension strength: 0/10   49 lbs  Quadriceps contraction: good    Additional Strength Details  strength is measured with pain scale and in pounds  Ambulation     Ambulation: Level Surfaces   Ambulation with assistive device: independent  Ambulation without assistive device: independent    Ambulation: Stairs   Ascend stairs: independent  Pattern: non-reciprocal  Railings: one rail  Descend stairs: independent  Pattern: creeps down    Observational Gait   Stride length within functional limits         Start Time: 0730  Stop Time: 0930  Total time in clinic (min): 120 minutes    Assessment    Assessment details: L TKR Sx  Understanding of Dx/Px/POC: excellent  Goals  Discharge    Plan  Plan details: Discharge        Subjective Evaluation    History of Present Illness  Date of onset: 3/14/2018  Mechanism of injury: L TKR Sx  Quality of life: excellent    Pain  Current pain ratin  At best pain ratin  At worst pain ratin  Quality: dull ache  Aggravating factors: stair climbing, standing and walking    Treatments  Current treatment: physical therapy  Patient Goals  Patient goal: Discharge        Objective     Observations   Left Knee   Negative for incision  Additional Observation Details  Looks good   Minimal swelling  Tenderness   Left Knee   No tenderness in the lateral patella, LCL (proximal), MCL (distal) and patellar tendon  Active Range of Motion   Left Knee   Flexion: 130 degrees   Extension: 3 degrees     Right Knee   Normal active range of motion  Flexion: 140 degrees   Extension: 0 degrees     Additional Active Range of Motion Details  Still needs a little more extension  Passive Range of Motion   Left Knee   Flexion: 127 degrees   Extension: 3 degrees     Right Knee   Flexion: 140 degrees   Extension: 0 degrees     Strength/Myotome Testing     Left Knee   Normal strength    Right Knee   Normal strength    Additional Strength Details  Still a little weaker on the left than the right but doing well  General Comments     Knee Comments  Patient states he is doing well enough and is now discharged            Precautions:     Daily Treatment Diary

## 2018-04-19 ENCOUNTER — TRANSCRIBE ORDERS (OUTPATIENT)
Dept: PHYSICAL THERAPY | Facility: CLINIC | Age: 78
End: 2018-04-19

## 2018-04-19 DIAGNOSIS — Z96.652 PRESENCE OF LEFT ARTIFICIAL KNEE JOINT: Primary | ICD-10-CM

## 2018-04-19 DIAGNOSIS — M17.12 OSTEOARTHRITIS OF LEFT KNEE, UNSPECIFIED OSTEOARTHRITIS TYPE: ICD-10-CM

## 2018-04-26 NOTE — PROGRESS NOTES
PT Discharge    Today's date: 2018  Patient name: Myron Partida  : 1940  MRN: [id number]  Referring provider: Jonnathan Barton MD  Dx:   Encounter Diagnosis     ICD-10-CM    1  S/P TKR (total knee replacement), left Z96 652    2  Osteoarthrosis, localized, primary, knee, left M17 12        Start Time: 07  Stop Time: 930  Total time in clinic (min): 120 minutes    Assessment/Plan    Subjective    Objective        Precautions: Patient has not returned  Patient is discharged at this time  Daily Treatment Diary   No treatment provided today

## 2018-04-26 NOTE — PROGRESS NOTES
Today's date: 2018  Patient name: Fredrick Boyer  : 1940  MRN: [id number]  Referring provider: Chaitanya Cottrell MD  Dx:   Encounter Diagnosis     ICD-10-CM    1  S/P TKR (total knee replacement), left Z96 652    2  Osteoarthrosis, localized, primary, knee, left M17 12                   Subjective: Felix reports that his discharged    Objective: See treatment diary below      Assessment: Tolerated treatment well  Patient would benefit from continued PT      Plan: Progress treatment as tolerated

## 2019-03-06 LAB — HBA1C MFR BLD HPLC: 6.8 %

## 2019-06-13 ENCOUNTER — OFFICE VISIT (OUTPATIENT)
Dept: FAMILY MEDICINE CLINIC | Facility: CLINIC | Age: 79
End: 2019-06-13
Payer: MEDICARE

## 2019-06-13 VITALS
WEIGHT: 168.4 LBS | DIASTOLIC BLOOD PRESSURE: 76 MMHG | SYSTOLIC BLOOD PRESSURE: 114 MMHG | BODY MASS INDEX: 30.99 KG/M2 | HEIGHT: 62 IN

## 2019-06-13 DIAGNOSIS — E11.9 TYPE 2 DIABETES MELLITUS WITHOUT COMPLICATION, WITHOUT LONG-TERM CURRENT USE OF INSULIN (HCC): Chronic | ICD-10-CM

## 2019-06-13 DIAGNOSIS — Z00.00 MEDICARE ANNUAL WELLNESS VISIT, SUBSEQUENT: Primary | ICD-10-CM

## 2019-06-13 DIAGNOSIS — I25.810 CORONARY ARTERY DISEASE INVOLVING CORONARY BYPASS GRAFT OF NATIVE HEART WITHOUT ANGINA PECTORIS: Chronic | ICD-10-CM

## 2019-06-13 DIAGNOSIS — M17.12 OSTEOARTHROSIS, LOCALIZED, PRIMARY, KNEE, LEFT: Chronic | ICD-10-CM

## 2019-06-13 DIAGNOSIS — E03.9 ACQUIRED HYPOTHYROIDISM: Chronic | ICD-10-CM

## 2019-06-13 DIAGNOSIS — I10 ESSENTIAL HYPERTENSION: Chronic | ICD-10-CM

## 2019-06-13 PROCEDURE — G0439 PPPS, SUBSEQ VISIT: HCPCS | Performed by: FAMILY MEDICINE

## 2019-06-13 PROCEDURE — 99214 OFFICE O/P EST MOD 30 MIN: CPT | Performed by: FAMILY MEDICINE

## 2019-06-13 RX ORDER — LISINOPRIL AND HYDROCHLOROTHIAZIDE 12.5; 1 MG/1; MG/1
1 TABLET ORAL DAILY
Refills: 3 | COMMUNITY
Start: 2019-04-06 | End: 2020-01-10 | Stop reason: SDUPTHER

## 2019-06-14 PROBLEM — E11.9 TYPE 2 DIABETES MELLITUS WITHOUT COMPLICATION, WITHOUT LONG-TERM CURRENT USE OF INSULIN (HCC): Status: ACTIVE | Noted: 2019-06-14

## 2019-06-14 PROBLEM — I10 ESSENTIAL HYPERTENSION: Status: ACTIVE | Noted: 2019-06-14

## 2019-06-14 PROBLEM — I25.810 CORONARY ARTERY DISEASE INVOLVING CORONARY BYPASS GRAFT OF NATIVE HEART WITHOUT ANGINA PECTORIS: Chronic | Status: ACTIVE | Noted: 2019-06-14

## 2019-06-14 PROBLEM — M17.12 OSTEOARTHROSIS, LOCALIZED, PRIMARY, KNEE, LEFT: Chronic | Status: ACTIVE | Noted: 2018-01-17

## 2019-06-14 PROBLEM — E03.9 ACQUIRED HYPOTHYROIDISM: Chronic | Status: ACTIVE | Noted: 2019-06-14

## 2019-06-14 PROBLEM — I10 ESSENTIAL HYPERTENSION: Chronic | Status: ACTIVE | Noted: 2019-06-14

## 2019-06-14 PROBLEM — E11.9 TYPE 2 DIABETES MELLITUS WITHOUT COMPLICATION, WITHOUT LONG-TERM CURRENT USE OF INSULIN (HCC): Chronic | Status: ACTIVE | Noted: 2019-06-14

## 2019-09-16 ENCOUNTER — OFFICE VISIT (OUTPATIENT)
Dept: FAMILY MEDICINE CLINIC | Facility: CLINIC | Age: 79
End: 2019-09-16
Payer: MEDICARE

## 2019-09-16 VITALS
SYSTOLIC BLOOD PRESSURE: 118 MMHG | HEIGHT: 62 IN | WEIGHT: 168 LBS | BODY MASS INDEX: 30.91 KG/M2 | DIASTOLIC BLOOD PRESSURE: 68 MMHG

## 2019-09-16 DIAGNOSIS — E78.00 HYPERCHOLESTEROLEMIA: ICD-10-CM

## 2019-09-16 DIAGNOSIS — M17.12 OSTEOARTHROSIS, LOCALIZED, PRIMARY, KNEE, LEFT: Chronic | ICD-10-CM

## 2019-09-16 DIAGNOSIS — I10 ESSENTIAL HYPERTENSION: Chronic | ICD-10-CM

## 2019-09-16 DIAGNOSIS — E11.9 TYPE 2 DIABETES MELLITUS WITHOUT COMPLICATION, WITHOUT LONG-TERM CURRENT USE OF INSULIN (HCC): Primary | Chronic | ICD-10-CM

## 2019-09-16 DIAGNOSIS — E03.9 ACQUIRED HYPOTHYROIDISM: ICD-10-CM

## 2019-09-16 DIAGNOSIS — I25.810 CORONARY ARTERY DISEASE INVOLVING CORONARY BYPASS GRAFT OF NATIVE HEART WITHOUT ANGINA PECTORIS: Chronic | ICD-10-CM

## 2019-09-16 PROCEDURE — 99214 OFFICE O/P EST MOD 30 MIN: CPT | Performed by: FAMILY MEDICINE

## 2019-09-16 RX ORDER — SIMVASTATIN 40 MG
40 TABLET ORAL
Qty: 90 TABLET | Refills: 3 | Status: SHIPPED | OUTPATIENT
Start: 2019-09-16 | End: 2020-10-15

## 2019-09-16 RX ORDER — AMOXICILLIN 500 MG
4 CAPSULE ORAL DAILY
COMMUNITY

## 2019-09-16 RX ORDER — LEVOTHYROXINE SODIUM 0.03 MG/1
25 TABLET ORAL DAILY
Qty: 90 TABLET | Refills: 3 | Status: SHIPPED | OUTPATIENT
Start: 2019-09-16 | End: 2019-11-07 | Stop reason: SDUPTHER

## 2019-09-16 NOTE — PROGRESS NOTES
Assessment/Plan:    No problem-specific Assessment & Plan notes found for this encounter  Diagnoses and all orders for this visit:    Type 2 diabetes mellitus without complication, without long-term current use of insulin ([facility] [address] )  Comments:  Has been showing good control continue to watch sweets and starch in diet  Orders:  -     HEMOGLOBIN A1C W/ EAG ESTIMATION; Future    Acquired hypothyroidism  Comments:  Overall stable continue current regimen  Orders:  -     levothyroxine 25 mcg tablet; Take 1 tablet (25 mcg total) by mouth daily    Hypercholesterolemia  Comments:  Overall stable continue baseline meds  Orders:  -     simvastatin (ZOCOR) 40 mg tablet; Take 1 tablet (40 mg total) by mouth daily at bedtime    Coronary artery disease involving coronary bypass graft of native heart without angina pectoris  Comments:  Doing well  Continue current regimen and follow-up  Push activity as tolerated    Essential hypertension  Comments:  Stable continue current regimen    Osteoarthrosis, localized, primary, knee, left  Comments:  Discussed problem is extremely active may need to address this with Orthopedics  Continue p r n  Meds    Other orders  -     Omega-3 Fatty Acids (FISH OIL) 1200 MG CAPS; Take by mouth          Subjective:      Patient ID: Benji Pack is a 70 y o  male  Patient has history of hypertension, hypothyroidism, coronary artery disease, diabetes mellitus adult and degenerative arthritis has been having a lot more trouble with pain in the low back does continue to have the right knee discomfort but continues to be very physically active      The following portions of the patient's history were reviewed and updated as appropriate: allergies, current medications, past medical history, past surgical history and problem list     Review of Systems   Constitutional: Negative for activity change, appetite change, chills, fatigue, fever and unexpected weight change     HENT: Negative for congestion, rhinorrhea and trouble swallowing  Eyes: Negative for visual disturbance  Respiratory: Negative for apnea, cough, chest tightness and shortness of breath  Cardiovascular: Negative for chest pain, palpitations and leg swelling  Gastrointestinal: Negative for abdominal distention, abdominal pain, constipation and diarrhea  Endocrine: Negative for polyuria (Nocturia x1)  Genitourinary: Positive for enuresis  Negative for difficulty urinating  Musculoskeletal: Positive for arthralgias, back pain and myalgias  Skin: Negative for rash  Allergic/Immunologic: Negative for environmental allergies  Neurological: Negative for dizziness, weakness, light-headedness, numbness and headaches  Hematological: Negative for adenopathy  Psychiatric/Behavioral: Negative for agitation  Objective:      /68 (BP Location: Left arm, Patient Position: Sitting, Cuff Size: Standard)   Ht 5' 2" (1 575 m)   Wt 76 2 kg (168 lb)   BMI 30 73 kg/m²          Physical Exam   Constitutional: He is oriented to person, place, and time  He appears well-developed  HENT:   Head: Normocephalic  Nose: Nose normal    Mouth/Throat: Oropharynx is clear and moist    Eyes: Pupils are equal, round, and reactive to light  Neck: Normal range of motion  Neck supple  No thyromegaly present  Cardiovascular: Normal rate, regular rhythm and intact distal pulses  Murmur (Mid systolic murmur at left sternal border heart rate is 72 no bruits are noted) heard  Pulmonary/Chest: Effort normal and breath sounds normal    Abdominal: Soft  He exhibits no distension and no mass  There is no tenderness  Musculoskeletal: Normal range of motion  He exhibits no edema  Lymphadenopathy:     He has no cervical adenopathy  Neurological: He is alert and oriented to person, place, and time  He displays abnormal reflex ( reflexes are 1+)  Coordination normal    Skin: Skin is warm and dry  Capillary refill takes 2 to 3 seconds   No rash noted    Psychiatric: He has a normal mood and affect  His behavior is normal  Judgment and thought content normal    Nursing note and vitals reviewed

## 2019-09-16 NOTE — PATIENT INSTRUCTIONS
Overall seems to be doing well but continues to have a lot of trouble with his back pain much of which I feel relates to his excessive activity  May want to address this with Orthopedics  Does take Tylenol for this which helps a little  Will have him check A1c for the diabetes should get flu shot in the fall    Should call here in about 2-3 weeks to see if we have the vaccine in

## 2019-09-17 LAB — HBA1C MFR BLD HPLC: 6.7 %

## 2019-09-17 NOTE — PROGRESS NOTES
Diabetic Foot Exam    Patient's shoes and socks removed  Right Foot/Ankle   Right Foot Inspection    Toe Exam: right toe deformity (Multiple hammertoes)  Sensory       Monofilament testing: intact  Vascular  Capillary refills: < 3 seconds  The right DP pulse is 1+  The right PT pulse is 1+  Left Foot/Ankle  Left Foot Inspection                           Toe Exam: left toe deformity ( multiple hammertoes)                   Sensory       Monofilament: intact  Vascular  Capillary refills: < 3 seconds  The left DP pulse is 1+  The left PT pulse is 1+  Assign Risk Category:  Deformity present; No loss of protective sensation;  No weak pulses       Risk: 1

## 2019-10-14 ENCOUNTER — EVALUATION (OUTPATIENT)
Dept: PHYSICAL THERAPY | Facility: CLINIC | Age: 79
End: 2019-10-14
Payer: MEDICARE

## 2019-10-14 ENCOUNTER — TRANSCRIBE ORDERS (OUTPATIENT)
Dept: PHYSICAL THERAPY | Facility: CLINIC | Age: 79
End: 2019-10-14

## 2019-10-14 DIAGNOSIS — M47.896 OTHER SPONDYLOSIS, LUMBAR REGION: Primary | ICD-10-CM

## 2019-10-14 DIAGNOSIS — M54.50 ACUTE BILATERAL LOW BACK PAIN WITHOUT SCIATICA: ICD-10-CM

## 2019-10-14 PROCEDURE — 97014 ELECTRIC STIMULATION THERAPY: CPT | Performed by: PHYSICAL THERAPIST

## 2019-10-14 PROCEDURE — 97535 SELF CARE MNGMENT TRAINING: CPT | Performed by: PHYSICAL THERAPIST

## 2019-10-14 PROCEDURE — 97112 NEUROMUSCULAR REEDUCATION: CPT | Performed by: PHYSICAL THERAPIST

## 2019-10-14 PROCEDURE — 97162 PT EVAL MOD COMPLEX 30 MIN: CPT | Performed by: PHYSICAL THERAPIST

## 2019-10-14 PROCEDURE — 97140 MANUAL THERAPY 1/> REGIONS: CPT | Performed by: PHYSICAL THERAPIST

## 2019-10-14 PROCEDURE — 97110 THERAPEUTIC EXERCISES: CPT | Performed by: PHYSICAL THERAPIST

## 2019-10-14 NOTE — LETTER
2019  Signature Required / Plan of Care / PT Evaluation  Jennifer Muro DO  [address]  [address] [place] [address]    Patient: Pam Chery   YOB: 1940   Date of Visit: 10/14/2019     Encounter Diagnosis     ICD-10-CM    1  Other spondylosis, lumbar region M47 896    2  Acute bilateral low back pain without sciatica M54 5      Dear Dr Simon Nez Perce: Thank you for your recent referral of Pam Chery  Please review the attached evaluation summary from [address] [place] [address] 1 recent visit  Please verify that you agree with the plan of care by signing the attached order  If you have any questions or concerns, please do not hesitate to call  I sincerely appreciate the opportunity to share in the care of one of your patients and hope to have another opportunity to work with you in the near future  Sincerely,    Jc Whitney, PT    Referring Provider:   I certify that I have read the below Plan of Care and certify the need for these services furnished under this plan of treatment while under my care  Jennifer Muro DO  [address]  [address] [place] [address]  96 : [phone]    Please SIGN ABOVE and return THIS PAGE ONLY to Fax # [phone]        PT Evaluation   Today's date: 10/14/2019  Patient name: Pam Chery  : 1940  MRN: [id number]  Referring provider: Linda Carver DO  Dx:   Encounter Diagnosis     ICD-10-CM    1  Other spondylosis, lumbar region M47 896    2  Acute bilateral low back pain without sciatica M54 5      Assessment  Assessment details: Patient reported low back pain and difficulty after standing  Impairments: abnormal or restricted ROM, abnormal movement, activity intolerance, impaired balance, impaired physical strength, pain with function and safety issue  Understanding of Dx/Px/POC: excellent   Prognosis: fair    Goals  STG  2-4 weeks:   Increase lumbar spine AROM by 2-4 degrees     Increase lower extremity strength by 2-4 lbs in all weak areas  Improve sitting posture and body mechanics  Increase standing/ADL tolerance minutes  Decrease pain by 25-50% with standing, walking, and stairs  Initiate HEP  LTG  6-8 weeks:   Demonstrate normal lumbar rotation  Decrease pain to 1-2/10 with activity  Increase lower extremity strength by 10-20 lbs in all weak areas  Improve spinal stability  Improve gait pattern and balance  Decrease limitations with standing, walking and ADL's  DC with HEP  Plan  Patient would benefit from: PT eval and skilled physical therapy  Planned modality interventions: TENS and unattended electrical stimulation  Planned therapy interventions: joint mobilization, manual therapy, neuromuscular re-education, patient education, postural training, self care, strengthening, stretching, therapeutic activities, therapeutic exercise, therapeutic training, transfer training, home exercise program, graded exercise, gait training, flexibility, coordination, balance and balance/weight bearing training  Frequency: 3x week  Duration in weeks: 6  Treatment plan discussed with: patient      Subjective Evaluation    Pain  Quality: discomfort, knife-like, pulling, squeezing, sharp, radiating and tight  Relieving factors: heat, change in position, relaxation and rest  Aggravating factors: lifting, running, stair climbing, walking and standing  Progression: worsening    Treatments  Current treatment: physical therapy  Patient Goals  Patient goals for therapy: decreased pain, improved balance, increased motion, return to work, return to [employer] activities, independence with ADLs/IADLs and increased strength        Objective    Date of onset:  10/1/2019    Date of Surgery:  None Recent    History of Present Episode: 10/14/2019  Mitzi Sexton states he has had back issues since 1996  He received surgery in 1996 and has had multiple acute episodes since    He does report falling down an embankment this summer and that flared up his back  Past Medical History:    10/14/2019  Felix reports lumbar back surgery in 1996  Heart issues  Type 2 diabetes  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  Left TKR SX     Previous Level of Functional Ability:  10/14/2019  Felix states his ability to stand and walk has become more and more of a challenge  His back is just getting worse and worse  Inspection / Palpation:  Lumbar:  10/14/2019  Mesomorphic body type  No signs of infection  No signs of wounds  No signs of drainage  No signs of ecchymotic regions  No signs of erythremic regions  Moderate signs of muscle spasm  Moderate signs of muscle guarding  Mild to moderate signs of tenderness reported to palpation  No signs of swelling  Positive signs of a surgery site  Current conditions appears consistent with recent acute episode  Chief Complaints:  10/14/2019  Felix reports mild to severe difficulty with standing  Fredia Cashing reports moderate to severe difficulty with walking  Fredia Cashing reports moderate to severe difficulty with movement / use of his lumbar region  Fredia Cashing reports moderate to severe difficulty with use of stairs  Fredia Cashing reports severe difficulty with running  Fredia Cashing reports severe difficulty with jumping  Fredia Cashing reports moderate to severe difficulty with use of inclines  Fredia Cashing reports mild to moderate difficulty with sleeping  Fredia Cashing reports mild to moderate difficulty with his strength and endurance  Fredia Cashing reports moderate limitations with his range of motion  Fredia Cashing reports mild difficulty lying on his lumbar region  Felix reports moderate difficulty twisting / turning his lumbar region      LUMBAR PAIN     Resting Palpation Bending Extending Walking Lifting   10/14/2019 0-4 2-6 3-6 3-6 3-9 4-9     LUMBAR PAIN     Pulling Pushing Sleeping Twisting Standing   10/14/2019 3-7 3-7 2-4 4-8 3-9     LUMBAR AROM Flexion Extension Rotation Right   10/14/2019 65° 5° 45° LUMBAR AROM Rotation Left Side Bending Right Side Bending Left   10/14/2019 45° 35° 35°     LUMBAR MMT Flexion Extension Rotation Right   10/14/2019 2/10  22 lbs 3/10  27 lbs 4/10  21 lbs     LUMBAR MMT Rotation Left Side Bend Right Side Bend Left   10/14/2019 3/10  22 lbs 3/10  23 lbs 3/10  22 lbs     LE MMT Dorsiflexion Plantarflexion Knee flexion Knee extension   10/14/2019 Rt 0/10  25 lbs 0/10  28 lbs 0/10  24 lbs 0/10  25 lbs   10/14/2019 Lt 0/10  32 lbs 0/10  35 lbs 0/10  20 lbs 0/10  19 lbs     LE MMT Hip Flexion Hip Abduction Hip Adduction   10/14/2019  Rt 0/10  18 lbs 0/10  21 lbs 0/10  20 lbs   10/14/2019  Lt 0/10  19 lbs 0/10  22 lbs 0/10  21 lbs     LUMBAR SCREEN Referred Pain Sacroiliac Joint test Squish Test Straight Leg  Raise   10/14/2019 Rt Negative Negative Negative Negative   10/14/2019 Lt Negative Negative Negative Negative     LUMBAR SCREEN Valsalva Gapping Bowstring sign Hip Bursitis   10/14/2019 Rt Negative Negative Negative Negative   10/14/2019 Lt Negative Negative Negative Negative     Precautions:  Low Back Pain    Daily Treatment Log  Manual  10/14       MT, ROM 15'       HEP 15'       Exercise Log 10/14       Balance Board 30x       Chair Squats        BOSU-Walk 5'       Foam Pad SLR,Hip/KneeFl,Step Ups 30x       Foam Beam 30x       P-Bar-GT-Forward, Backward,Side-Even & Dips        Monster Steps        Fitter-Slalom        T/G-Squats,PF 30x       W/P-PNF,IR,ER,PU,PS:Top,Mid,Bot 10#-30x       Trimax-BP,TKE        Saul-BP,Lats,PD,Row 35#-30x       Djbdubd-FD-Pt 2x2'       NK Table 10#-30x       TM        UBC 10'       Bike 10'       Stepper        ME, PE 15'               Modalities 10/14       MH&ES 20'       US

## 2019-10-16 ENCOUNTER — OFFICE VISIT (OUTPATIENT)
Dept: PHYSICAL THERAPY | Facility: CLINIC | Age: 79
End: 2019-10-16
Payer: MEDICARE

## 2019-10-16 DIAGNOSIS — M54.50 ACUTE BILATERAL LOW BACK PAIN WITHOUT SCIATICA: ICD-10-CM

## 2019-10-16 DIAGNOSIS — M47.896 OTHER SPONDYLOSIS, LUMBAR REGION: Primary | ICD-10-CM

## 2019-10-16 PROCEDURE — 97110 THERAPEUTIC EXERCISES: CPT

## 2019-10-16 PROCEDURE — 97014 ELECTRIC STIMULATION THERAPY: CPT

## 2019-10-16 PROCEDURE — 97140 MANUAL THERAPY 1/> REGIONS: CPT

## 2019-10-16 NOTE — PROGRESS NOTES
Today's date: 10/16/2019  Patient name: Mellisa Katz  : 1940  MRN: [id number]  Referring provider: Jazmine Koo DO  Dx:   Encounter Diagnosis     ICD-10-CM    1  Other spondylosis, lumbar region M47 896    2  Acute bilateral low back pain without sciatica M54 5      Subjective:  No new c/o pain today  Objective: See treatment log below    Assessment: Tolerated treatment well  Patient exhibited good technique with therapeutic exercises and would benefit from continued PT to increase ROM/strength and endurance to improve mobility  Plan: Continue per plan of care  Progress treatment as tolerated         Precautions:  Low Back Pain    Daily Treatment Log  Manual  10/14 10/16      MT, ROM 15' 20'      HEP 15'       Exercise Log 10/14 10/16      Balance Board 30x 30x ea      Chair Squats        BOSU-Walk 5' 5'      Foam Pad SLR,Hip/KneeFl,Step Ups 30x       Foam Beam 30x       P-Bar-GT-Forward, Backward,Side-Even & Dips        Monster Steps        Fitter-Slalom        T/G-Squats,PF 30x 30x ea      W/P-PNF,IR,ER,PU,PS:Top,Mid,Bot 10#-30x 10# 30x ea      Trimax-BP,TKE  30x ea      Saul-BP,Lats,PD,Row 35#-30x 35# 30x ea      Rdatmwi-NL-Xl 2x2' 2x2'      NK Table 10#-30x       TM        UBC 10' 10'      Bike 10'       Stepper        ME, PE 15' 15'              Modalities 10/14 10/16      MH&ES 20' 20'      US  NT

## 2019-10-18 ENCOUNTER — OFFICE VISIT (OUTPATIENT)
Dept: PHYSICAL THERAPY | Facility: CLINIC | Age: 79
End: 2019-10-18
Payer: MEDICARE

## 2019-10-18 DIAGNOSIS — M54.50 ACUTE BILATERAL LOW BACK PAIN WITHOUT SCIATICA: ICD-10-CM

## 2019-10-18 DIAGNOSIS — M47.896 OTHER SPONDYLOSIS, LUMBAR REGION: Primary | ICD-10-CM

## 2019-10-18 PROCEDURE — 97140 MANUAL THERAPY 1/> REGIONS: CPT

## 2019-10-18 PROCEDURE — 97110 THERAPEUTIC EXERCISES: CPT

## 2019-10-18 PROCEDURE — 97014 ELECTRIC STIMULATION THERAPY: CPT

## 2019-10-18 NOTE — PROGRESS NOTES
Today's date: 10/18/2019  Patient name: Hans Izquierdo  : 1940  MRN: [id number]  Referring provider: Xena Becerra DO  Dx:   Encounter Diagnosis     ICD-10-CM    1  Other spondylosis, lumbar region M47 896    2  Acute bilateral low back pain without sciatica M54 5      Subjective:  No new c/o pain today  Objective: See treatment log below    Assessment: Tolerated treatment well  Patient exhibited good technique with therapeutic exercises and would benefit from continued PT to increase ROM/strength and endurance to improve mobility  Plan: Continue per plan of care  Progress treatment as tolerated         Precautions:  Low Back Pain    Daily Treatment Log  Manual  10/14 10/16 10/18     MT, ROM 15' 20' 20'     HEP 15'       Exercise Log 10/14 10/16 10/18     Balance Board 30x 30x ea 30x ea     Chair Squats        BOSU-Walk 5' 5' 5'     Foam Pad SLR,Hip/KneeFl,Step Ups 30x       Foam Beam 30x       P-Bar-GT-Forward, Backward,Side-Even & Dips        T/G-Squats,PF 30x 30x ea 30x ea     W/P-PNF,IR,ER,PU,PS:Top,Mid,Bot 10#-30x 10# 30x ea 10# 30x ea     Trimax-BP & TKE  30x ea 30x      Saul-BP,Lats,PD,Row 35#-30x 35# 30x ea 45# 30x ea     Sqajmeh-MO-Be 2x2' 2x2' 2x2'     NK Table 10#-30x       UBC 10' 10'      Bike 10'       ME, PE 15' 15' 15'             Modalities 10/14 10/16 10/18     MH&ES 20' 20' 20'     US  NT NT

## 2019-10-21 ENCOUNTER — OFFICE VISIT (OUTPATIENT)
Dept: PHYSICAL THERAPY | Facility: CLINIC | Age: 79
End: 2019-10-21
Payer: MEDICARE

## 2019-10-21 DIAGNOSIS — M47.896 OTHER SPONDYLOSIS, LUMBAR REGION: Primary | ICD-10-CM

## 2019-10-21 DIAGNOSIS — M54.50 ACUTE BILATERAL LOW BACK PAIN WITHOUT SCIATICA: ICD-10-CM

## 2019-10-21 PROCEDURE — 97110 THERAPEUTIC EXERCISES: CPT

## 2019-10-21 PROCEDURE — 97014 ELECTRIC STIMULATION THERAPY: CPT

## 2019-10-21 PROCEDURE — 97140 MANUAL THERAPY 1/> REGIONS: CPT

## 2019-10-21 NOTE — PROGRESS NOTES
Today's date: 10/21/2019  Patient name: Sangita Kevin  : 1940  MRN: [id number]  Referring provider: Edith Ronquillo DO  Dx:   Encounter Diagnosis     ICD-10-CM    1  Other spondylosis, lumbar region M47 896    2  Acute bilateral low back pain without sciatica M54 5      Subjective:  No new c/o pain today  Objective: See treatment log below    Assessment: Tolerated treatment well  Patient exhibited good technique with therapeutic exercises and would benefit from continued PT to increase ROM/strength and endurance to improve mobility  Plan: Continue per plan of care  Progress treatment as tolerated         Precautions:  Low Back Pain    Daily Treatment Log  Manual  10/14 10/16 10/18 10/21    MT, ROM 15' 20' 20' 20'    HEP 15'       Exercise Log 10/14 10/16 10/18 10/21    Balance Board 30x 30x ea 30x ea 30x ea    Chair Squats        BOSU-Walk 5' 5' 5' 30x     Foam Pad SLR,Hip/KneeFl,Step Ups 30x       Foam Beam 30x       P-Bar-GT-Forward, Backward,Side-Even & Dips        T/G-Squats,PF 30x 30x ea 30x ea 30x ea    W/P-PNF,IR,ER,PU,PS:Top,Mid,Bot 10#-30x 10# 30x ea 10# 30x ea 5# 30x ea    Trimax-BP & TKE  30x ea 30x  30x ea    Saul-BP,Lats,PD,Row 35#-30x 35# 30x ea 45# 30x ea 45# 30x ea    Ndqqeev-UA-Lp 2x2' 2x2' 2x2' 2x2'    NK Table 10#-30x       UBC 10' 10'  10'    Bike 10'       ME, PE 15' 15' 15' 15'            Modalities 10/14 10/16 10/18 10/21    MH&ES 20' 20' 20' 20'    US  NT NT NT

## 2019-10-23 ENCOUNTER — OFFICE VISIT (OUTPATIENT)
Dept: PHYSICAL THERAPY | Facility: CLINIC | Age: 79
End: 2019-10-23
Payer: MEDICARE

## 2019-10-23 DIAGNOSIS — M54.50 ACUTE BILATERAL LOW BACK PAIN WITHOUT SCIATICA: ICD-10-CM

## 2019-10-23 DIAGNOSIS — M47.896 OTHER SPONDYLOSIS, LUMBAR REGION: Primary | ICD-10-CM

## 2019-10-23 PROCEDURE — 97014 ELECTRIC STIMULATION THERAPY: CPT

## 2019-10-23 PROCEDURE — 97140 MANUAL THERAPY 1/> REGIONS: CPT

## 2019-10-23 PROCEDURE — 97110 THERAPEUTIC EXERCISES: CPT

## 2019-10-23 NOTE — PROGRESS NOTES
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 "    Objective: See treatment log below    Assessment: Tolerated treatment well  Patient exhibited good technique with therapeutic exercises and would benefit from continued PT to increase ROM/strength and endurance to improve mobility  Plan: Continue per plan of care  Progress treatment as tolerated         Precautions:  Low Back Pain    Daily Treatment Log  Manual  10/14 10/16 10/18 10/21 10/23   MT, ROM 15' 20' 20' 20' 20'   HEP 15'       Exercise Log 10/14 10/16 10/18 10/21 10/23   Balance Board 30x 30x ea 30x ea 30x ea 30x ea   Chair Squats        BOSU-Walk 5' 5' 5' 30x  5'   Foam Pad SLR,Hip/KneeFl,Step Ups 30x       Foam Beam 30x       P-Bar-GT-Forward, Backward,Side-Even & Dips        T/G-Squats,PF 30x 30x ea 30x ea 30x ea 30x ea   W/P-PNF,IR,ER,PU,PS:Top,Mid,Bot 10#-30x 10# 30x ea 10# 30x ea 5# 30x ea 10# 30x ea   Trimax-BP & TKE  30x ea 30x  30x ea 30x ea   Saul-BP,Lats,PD,Row 35#-30x 35# 30x ea 45# 30x ea 45# 30x ea 45# 30x ea   Xgkenkw-GJ-Ra 2x2' 2x2' 2x2' 2x2' 2x2'   NK Table 10#-30x       UBC 10' 10'  10' 10'   Bike 10'       ME, PE 15' 15' 15' 15' 15'           Modalities 10/14 10/16 10/18 10/21 10/23   MH&ES 20' 20' 20' 20' 20'   US  NT NT NT NT

## 2019-10-25 ENCOUNTER — OFFICE VISIT (OUTPATIENT)
Dept: PHYSICAL THERAPY | Facility: CLINIC | Age: 79
End: 2019-10-25
Payer: MEDICARE

## 2019-10-25 DIAGNOSIS — M54.50 ACUTE BILATERAL LOW BACK PAIN WITHOUT SCIATICA: ICD-10-CM

## 2019-10-25 DIAGNOSIS — M47.896 OTHER SPONDYLOSIS, LUMBAR REGION: Primary | ICD-10-CM

## 2019-10-25 PROCEDURE — 97110 THERAPEUTIC EXERCISES: CPT

## 2019-10-25 PROCEDURE — 97140 MANUAL THERAPY 1/> REGIONS: CPT

## 2019-10-25 PROCEDURE — 97014 ELECTRIC STIMULATION THERAPY: CPT

## 2019-10-25 NOTE — PROGRESS NOTES
Today's date: 10/25/2019  Patient name: Sienna Olson  : 1940  MRN: [id number]  Referring provider: Ross Akins DO  Dx:   Encounter Diagnosis     ICD-10-CM    1  Other spondylosis, lumbar region M47 896    2  Acute bilateral low back pain without sciatica M54 5      Subjective:  No new c/o pain today  Objective: See treatment log below    Assessment: Tolerated treatment well  Patient exhibited good technique with therapeutic exercises and would benefit from continued PT to increase ROM/strength and endurance to improve mobility  Plan: Continue per plan of care  Progress treatment as tolerated         Precautions:  Low Back Pain    Daily Treatment Log  Manual  10/25    10/23   MT, ROM 20'    20'   HEP        Exercise Log 10/25    10/23   Balance Board 30x    30x ea   Chair Squats        BOSU-Walk 5'    5'   Foam Pad SLR,Hip/KneeFl,Step Ups        Foam Beam        P-Bar-GT-Forward, Backward,Side-Even & Dips        T/G-Squats,PF 30x    30x ea   W/P-PNF,IR,ER,PU,PS:Top,Mid,Bot 10#-30x    10# 30x ea   Trimax-BP & TKE 30x ea    30x ea   Saul-BP,Lats,PD,Row 25# 30x ea    45# 30x ea   Doxbetp-EO-Fo 2x2'    2x2'   NK Table        UBC 10'    10'   Bike        ME, PE 15'    15'           Modalities 10/25    10/23   MH&ES 20'    20'   US NT    NT

## 2019-10-28 ENCOUNTER — IMMUNIZATIONS (OUTPATIENT)
Dept: FAMILY MEDICINE CLINIC | Facility: CLINIC | Age: 79
End: 2019-10-28
Payer: MEDICARE

## 2019-10-28 ENCOUNTER — OFFICE VISIT (OUTPATIENT)
Dept: PHYSICAL THERAPY | Facility: CLINIC | Age: 79
End: 2019-10-28
Payer: MEDICARE

## 2019-10-28 DIAGNOSIS — M54.50 ACUTE BILATERAL LOW BACK PAIN WITHOUT SCIATICA: ICD-10-CM

## 2019-10-28 DIAGNOSIS — Z23 ENCOUNTER FOR IMMUNIZATION: ICD-10-CM

## 2019-10-28 DIAGNOSIS — M47.896 OTHER SPONDYLOSIS, LUMBAR REGION: Primary | ICD-10-CM

## 2019-10-28 PROCEDURE — 97014 ELECTRIC STIMULATION THERAPY: CPT

## 2019-10-28 PROCEDURE — G0008 ADMIN INFLUENZA VIRUS VAC: HCPCS

## 2019-10-28 PROCEDURE — 97140 MANUAL THERAPY 1/> REGIONS: CPT

## 2019-10-28 PROCEDURE — 97110 THERAPEUTIC EXERCISES: CPT

## 2019-10-28 PROCEDURE — 90662 IIV NO PRSV INCREASED AG IM: CPT

## 2019-10-28 NOTE — PROGRESS NOTES
Today's date: 10/28/2019  Patient name: Cassandra You  : 1940  MRN: [id number]  Referring provider: Hayden Garcia DO  Dx:   Encounter Diagnosis     ICD-10-CM    1  Other spondylosis, lumbar region M47 896    2  Acute bilateral low back pain without sciatica M54 5      Subjective:  No new c/o pain today  Objective: See treatment log below    Assessment: Tolerated treatment well  Patient exhibited good technique with therapeutic exercises and would benefit from continued PT to increase ROM/strength and endurance to improve mobility  Plan: Continue per plan of care  Progress treatment as tolerated         Precautions:  Low Back Pain    Daily Treatment Log  Manual  10/25 10/28      MT, ROM 20' 20'      HEP        Exercise Log 10/25 10/28      Balance Board 30x 30x ea      Chair Squats        BOSU-Walk 5' 5'      Foam Pad SLR,Hip/KneeFl,Step Ups        Foam Beam        P-Bar-GT-Forward, Backward,Side-Even & Dips        T/G-Squats,PF 30x 30x ea      W/P-PNF,IR,ER,PU,PS:Top,Mid,Bot 10#-30x 10# 30x ea      Trimax-BP & TKE 30x ea 30x ea      Saul-BP,Lats,PD,Row 25# 30x ea 25# 30x ea      Pltmguh-IQ-Ie 2x2' 2x2'      NK Table        UBC 10' 10'      Bike        ME, PE 15' 15'              Modalities 10/25 10/28      MH&ES 20' 20'      US NT NT

## 2019-10-30 ENCOUNTER — OFFICE VISIT (OUTPATIENT)
Dept: PHYSICAL THERAPY | Facility: CLINIC | Age: 79
End: 2019-10-30
Payer: MEDICARE

## 2019-10-30 DIAGNOSIS — M54.50 ACUTE BILATERAL LOW BACK PAIN WITHOUT SCIATICA: ICD-10-CM

## 2019-10-30 DIAGNOSIS — M47.896 OTHER SPONDYLOSIS, LUMBAR REGION: Primary | ICD-10-CM

## 2019-10-30 PROCEDURE — 97110 THERAPEUTIC EXERCISES: CPT

## 2019-10-30 PROCEDURE — 97140 MANUAL THERAPY 1/> REGIONS: CPT

## 2019-10-30 PROCEDURE — 97014 ELECTRIC STIMULATION THERAPY: CPT

## 2019-10-30 NOTE — PROGRESS NOTES
Today's date: 10/30/2019  Patient name: Shiva Pierre  : 1940  MRN: [id number]  Referring provider: Uvaldo Gill DO  Dx:   Encounter Diagnosis     ICD-10-CM    1  Other spondylosis, lumbar region M47 896    2  Acute bilateral low back pain without sciatica M54 5      Subjective:  No new c/o pain today  Objective: See treatment log below    Assessment: Tolerated treatment well  Patient exhibited good technique with therapeutic exercises and would benefit from continued PT to increase ROM/strength and endurance to improve mobility  Plan: Continue per plan of care  Progress treatment as tolerated         Precautions:  Low Back Pain    Daily Treatment Log  Manual  10/25 10/28 10/30     MT, ROM 20' 20' 20'     HEP        Exercise Log 10/25 10/28 10/30     Balance Board 30x 30x ea 30x ea     Chair Squats        BOSU-Walk 5' 5' 5'     Foam Pad SLR,Hip/KneeFl,Step Ups        Foam Beam        P-Bar-GT-Forward, Backward,Side-Even & Dips        T/G-Squats,PF 30x 30x ea 30x ea     W/P-PNF,IR,ER,PU,PS:Top,Mid,Bot 10#-30x 10# 30x ea 10# 30x ea     Trimax-BP & TKE 30x ea 30x ea 30x ea     Saul-BP,Lats,PD,Row 25# 30x ea 25# 30x ea 30# 30x ea     Svlprum-BW-Xc 2x2' 2x2' 2x2'     NK Table        UBC 10' 10' 10'     Bike        ME, PE 15' 15' 15'             Modalities 10/25 10/28 10/30     MH&ES 20' 20' 20'     US NT NT NT

## 2019-11-01 ENCOUNTER — OFFICE VISIT (OUTPATIENT)
Dept: PHYSICAL THERAPY | Facility: CLINIC | Age: 79
End: 2019-11-01
Payer: MEDICARE

## 2019-11-01 DIAGNOSIS — M47.896 OTHER SPONDYLOSIS, LUMBAR REGION: Primary | ICD-10-CM

## 2019-11-01 DIAGNOSIS — M54.50 ACUTE BILATERAL LOW BACK PAIN WITHOUT SCIATICA: ICD-10-CM

## 2019-11-01 PROCEDURE — 97110 THERAPEUTIC EXERCISES: CPT

## 2019-11-01 PROCEDURE — 97140 MANUAL THERAPY 1/> REGIONS: CPT

## 2019-11-01 PROCEDURE — 97014 ELECTRIC STIMULATION THERAPY: CPT

## 2019-11-01 NOTE — PROGRESS NOTES
Today's date: 2019  Patient name: Gareth Ocampo  : 1940  MRN: [id number]  Referring provider: Reddy Corrigan DO  Dx:   Encounter Diagnosis     ICD-10-CM    1  Other spondylosis, lumbar region M47 896    2  Acute bilateral low back pain without sciatica M54 5      Subjective:  No new c/o pain today  Objective: See treatment log below    Assessment: Tolerated treatment well  Patient exhibited good technique with therapeutic exercises and would benefit from continued PT to increase ROM/strength and endurance to improve mobility  Plan: Continue per plan of care  Progress treatment as tolerated         Precautions:  Low Back Pain    Daily Treatment Log  Manual  10/25 10/28 10/30 11/1    MT, ROM 20' 20' 20' 15'    HEP        Exercise Log 10/25 10/28 10/30 11/1    Balance Board 30x 30x ea 30x ea 30x ea    Chair Squats        BOSU-Walk 5' 5' 5' 5'    Foam Pad SLR,Hip/KneeFl,Step Ups        Foam Beam        P-Bar-GT-Forward, Backward,Side-Even & Dips        T/G-Squats,PF 30x 30x ea 30x ea 30x ea    W/P-PNF,IR,ER,PU,PS:Top,Mid,Bot 10#-30x 10# 30x ea 10# 30x ea 10# 30x ea    Trimax-BP & TKE 30x ea 30x ea 30x ea 30x ea    Saul-BP,Lats,PD,Row 25# 30x ea 25# 30x ea 30# 30x ea 30# 30x ea    Jeukjen-AK-Hp 2x2' 2x2' 2x2' 2x2'    NK Table        UBC 10' 10' 10' 10'    Bike        ME, PE 15' 15' 15' 15'            Modalities 10/25 10/28 10/30 11/1    MH&ES 20' 20' 20' 20'    US NT NT NT NT

## 2019-11-04 ENCOUNTER — OFFICE VISIT (OUTPATIENT)
Dept: PHYSICAL THERAPY | Facility: CLINIC | Age: 79
End: 2019-11-04
Payer: MEDICARE

## 2019-11-04 DIAGNOSIS — M54.50 ACUTE BILATERAL LOW BACK PAIN WITHOUT SCIATICA: ICD-10-CM

## 2019-11-04 DIAGNOSIS — M47.896 OTHER SPONDYLOSIS, LUMBAR REGION: Primary | ICD-10-CM

## 2019-11-04 PROCEDURE — 97140 MANUAL THERAPY 1/> REGIONS: CPT

## 2019-11-04 PROCEDURE — 97110 THERAPEUTIC EXERCISES: CPT

## 2019-11-04 PROCEDURE — 97014 ELECTRIC STIMULATION THERAPY: CPT

## 2019-11-04 NOTE — PROGRESS NOTES
Today's date: 2019  Patient name: Bruce Garay  : 1940  MRN: [id number]  Referring provider: Jose Saeed DO  Dx:   Encounter Diagnosis     ICD-10-CM    1  Other spondylosis, lumbar region M47 896    2  Acute bilateral low back pain without sciatica M54 5      Subjective:  No new c/o pain today  Objective: See treatment log below    Assessment: Tolerated treatment well  Patient exhibited good technique with therapeutic exercises and would benefit from continued PT to increase ROM/strength and endurance to improve mobility  Plan: Continue per plan of care  Progress treatment as tolerated         Precautions:  Low Back Pain    Daily Treatment Log  Manual  10/25 10/28 10/30 11/1 11/4   MT, ROM 20' 20' 20' 15' 20'   HEP        Exercise Log 10/25 10/28 10/30 11/1 11/4   Balance Board 30x 30x ea 30x ea 30x ea 30x ea   Chair Squats        BOSU-Walk 5' 5' 5' 5' 5'   Foam Pad SLR,Hip/KneeFl,Step Ups        Foam Beam        P-Bar-GT-Forward, Backward,Side-Even & Dips        T/G-Squats,PF 30x 30x ea 30x ea 30x ea 30x ea   W/P-PNF,IR,ER,PU,PS:Top,Mid,Bot 10#-30x 10# 30x ea 10# 30x ea 10# 30x ea 10# 30x ea   Trimax-BP & TKE 30x ea 30x ea 30x ea 30x ea 30x ea   Saul-BP,Lats,PD,Row 25# 30x ea 25# 30x ea 30# 30x ea 30# 30x ea 40# 30x ea   Hwvjuhi-II-Fg 2x2' 2x2' 2x2' 2x2' 2x2'   NK Table        UBC 10' 10' 10' 10' 10'   Bike        ME, PE 15' 15' 15' 15' 15'           Modalities 10/25 10/28 10/30 11/1 11/4   MH&ES 20' 20' 20' 20' 20'   US NT NT NT NT NT

## 2019-11-06 ENCOUNTER — OFFICE VISIT (OUTPATIENT)
Dept: PHYSICAL THERAPY | Facility: CLINIC | Age: 79
End: 2019-11-06
Payer: MEDICARE

## 2019-11-06 DIAGNOSIS — M47.896 OTHER SPONDYLOSIS, LUMBAR REGION: Primary | ICD-10-CM

## 2019-11-06 DIAGNOSIS — M54.50 ACUTE BILATERAL LOW BACK PAIN WITHOUT SCIATICA: ICD-10-CM

## 2019-11-06 PROCEDURE — 97140 MANUAL THERAPY 1/> REGIONS: CPT

## 2019-11-06 PROCEDURE — 97014 ELECTRIC STIMULATION THERAPY: CPT

## 2019-11-06 PROCEDURE — 97110 THERAPEUTIC EXERCISES: CPT

## 2019-11-06 NOTE — PROGRESS NOTES
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 "    Objective: See treatment log below    Assessment: Tolerated treatment well  Patient exhibited good technique with therapeutic exercises and would benefit from continued PT to increase ROM/strength and endurance to improve mobility  Plan: Continue per plan of care  Progress treatment as tolerated         Precautions:  Low Back Pain    Daily Treatment Log  Manual     MT, ROM 20'    20'   HEP        Exercise Log    Balance Board 30x    30x ea   Chair Squats        BOSU-Walk 5'    5'   Foam Pad SLR,Hip/KneeFl,Step Ups        Foam Beam        P-Bar-GT-Forward, Backward,Side-Even & Dips        T/G-Squats,PF 30x    30x ea   W/P-PNF,IR,ER,PU,PS:Top,Mid,Bot 10#-30x    10# 30x ea   Trimax-BP & TKE 30x ea    30x ea   Saul-BP,Lats,PD,Row 30# 30x ea    40# 30x ea   [place]-QB-Re 2x2'    2x2'   NK Table        UBC 10'    10'   Bike        ME, PE 15'    15'           Modalities    MH&ES 20'    20'   US NT    NT

## 2019-11-07 DIAGNOSIS — E03.9 ACQUIRED HYPOTHYROIDISM: ICD-10-CM

## 2019-11-07 RX ORDER — LEVOTHYROXINE SODIUM 0.03 MG/1
25 TABLET ORAL DAILY
Qty: 90 TABLET | Refills: 3 | Status: SHIPPED | OUTPATIENT
Start: 2019-11-07 | End: 2020-10-15

## 2019-11-08 ENCOUNTER — OFFICE VISIT (OUTPATIENT)
Dept: PHYSICAL THERAPY | Facility: CLINIC | Age: 79
End: 2019-11-08
Payer: MEDICARE

## 2019-11-08 DIAGNOSIS — M47.896 OTHER SPONDYLOSIS, LUMBAR REGION: Primary | ICD-10-CM

## 2019-11-08 DIAGNOSIS — M54.50 ACUTE BILATERAL LOW BACK PAIN WITHOUT SCIATICA: ICD-10-CM

## 2019-11-08 PROCEDURE — 97110 THERAPEUTIC EXERCISES: CPT

## 2019-11-08 PROCEDURE — 97140 MANUAL THERAPY 1/> REGIONS: CPT

## 2019-11-08 PROCEDURE — 97014 ELECTRIC STIMULATION THERAPY: CPT

## 2019-11-08 NOTE — PROGRESS NOTES
Today's date: 2019  Patient name: Pam Chery  : 1940  MRN: [id number]  Referring provider: Linda Carver DO  Dx:   Encounter Diagnosis     ICD-10-CM    1  Other spondylosis, lumbar region M47 896    2  Acute bilateral low back pain without sciatica M54 5      Subjective:  No new c/o pain today  Objective: See treatment log below    Assessment: Tolerated treatment well  Patient exhibited good technique with therapeutic exercises and would benefit from continued PT to increase ROM/strength and endurance to improve mobility  Plan: Continue per plan of care  Progress treatment as tolerated         Precautions:  Low Back Pain    Daily Treatment Log  Manual        MT, ROM 20' 30'      HEP        Exercise Log       Balance Board 30x 30x ea      Chair Squats        BOSU-Walk 5' 5'      Foam Pad SLR,Hip/KneeFl,Step Ups        Foam Beam        P-Bar-GT-Forward, Backward,Side-Even & Dips        T/G-Squats,PF 30x 30x ea      W/P-PNF,IR,ER,PU,PS:Top,Mid,Bot 10#-30x 10# 30x ea      Trimax-BP & TKE 30x ea 30x ea      Saul-BP,Lats,PD,Row 30# 30x ea 35# 30x ea      Gjxermk-DV-Ft 2x2' 2x2'      NK Table        UBC 10' 10'      Bike        ME, PE 15' 15'              Modalities       MH&ES 20' 20'      US NT NT

## 2019-11-11 ENCOUNTER — OFFICE VISIT (OUTPATIENT)
Dept: PHYSICAL THERAPY | Facility: CLINIC | Age: 79
End: 2019-11-11
Payer: MEDICARE

## 2019-11-11 DIAGNOSIS — M47.896 OTHER SPONDYLOSIS, LUMBAR REGION: Primary | ICD-10-CM

## 2019-11-11 DIAGNOSIS — M54.50 ACUTE BILATERAL LOW BACK PAIN WITHOUT SCIATICA: ICD-10-CM

## 2019-11-11 PROCEDURE — 97110 THERAPEUTIC EXERCISES: CPT

## 2019-11-11 PROCEDURE — 97140 MANUAL THERAPY 1/> REGIONS: CPT

## 2019-11-11 PROCEDURE — 97014 ELECTRIC STIMULATION THERAPY: CPT

## 2019-11-11 NOTE — PROGRESS NOTES
Today's date: 2019  Patient name: Maryjane Saez  : 1940  MRN: [id number]  Referring provider: Angel Batista DO  Dx:   Encounter Diagnosis     ICD-10-CM    1  Other spondylosis, lumbar region M47 896    2  Acute bilateral low back pain without sciatica M54 5      Subjective:  No new c/o pain today  Objective: See treatment log below    Assessment: Tolerated treatment well  Patient exhibited good technique with therapeutic exercises and would benefit from continued PT to increase ROM/strength and endurance to improve mobility  Plan: Continue per plan of care  Progress treatment as tolerated         Precautions:  Low Back Pain    Daily Treatment Log  Manual       MT, ROM 20' 30' 20'     HEP        Exercise Log      Balance Board 30x 30x ea 30x ea     Chair Squats        BOSU-Walk 5' 5' 5'     Foam Pad SLR,Hip/KneeFl,Step Ups        Foam Beam        P-Bar-GT-Forward, Backward,Side-Even & Dips        T/G-Squats,PF 30x 30x ea 30x ea     W/P-PNF,IR,ER,PU,PS:Top,Mid,Bot 10#-30x 10# 30x ea 10# 30x ea     Trimax-BP & TKE 30x ea 30x ea 30x ea     Saul-BP,Lats,PD,Row 30# 30x ea 35# 30x ea 35# 30x ea     Xkwgnrl-MI-Fo 2x2' 2x2' 2x2'     NK Table        UBC 10' 10' 10'     Bike        ME, PE 15' 15' 15'             Modalities      MH&ES 20' 20' 20'     US NT NT NT

## 2019-11-13 ENCOUNTER — OFFICE VISIT (OUTPATIENT)
Dept: PHYSICAL THERAPY | Facility: CLINIC | Age: 79
End: 2019-11-13
Payer: MEDICARE

## 2019-11-13 DIAGNOSIS — M54.50 ACUTE BILATERAL LOW BACK PAIN WITHOUT SCIATICA: ICD-10-CM

## 2019-11-13 DIAGNOSIS — M47.896 OTHER SPONDYLOSIS, LUMBAR REGION: Primary | ICD-10-CM

## 2019-11-13 PROCEDURE — 97014 ELECTRIC STIMULATION THERAPY: CPT

## 2019-11-13 PROCEDURE — 97110 THERAPEUTIC EXERCISES: CPT

## 2019-11-13 PROCEDURE — 97140 MANUAL THERAPY 1/> REGIONS: CPT

## 2019-11-13 NOTE — PROGRESS NOTES
Today's date: 2019  Patient name: Julia Hung  : 1940  MRN: [id number]  Referring provider: Haris Valerio DO  Dx:   Encounter Diagnosis     ICD-10-CM    1  Other spondylosis, lumbar region M47 896    2  Acute bilateral low back pain without sciatica M54 5      Subjective:  No new c/o pain today  Objective: See treatment log below    Assessment: Tolerated treatment well  Patient exhibited good technique with therapeutic exercises and would benefit from continued PT to increase ROM/strength and endurance to improve mobility  Plan: Continue per plan of care  Progress treatment as tolerated         Precautions:  Low Back Pain    Daily Treatment Log  Manual      MT, ROM 20' 30' 20' 20'    HEP        Exercise Log     Balance Board 30x 30x ea 30x ea 30x ea    Chair Squats        BOSU-Walk 5' 5' 5' 5'    T/G-Squats,PF 30x 30x ea 30x ea 30x ea    W/P-PNF,IR,ER,PU,PS:Top,Mid,Bot 10#-30x 10# 30x ea 10# 30x ea 10# 30x ea    Trimax-BP & TKE 30x ea 30x ea 30x ea 30x ea    Saul-BP,Lats,PD,Row 30# 30x ea 35# 30x ea 35# 30x ea 35# 30x ea    Gbolkae-HP-Ly 2x2' 2x2' 2x2' 2x2'    NK Table        UBC 10' 10' 10' 10'    Bike        ME, PE 15' 15' 15' 15'            Modalities     MH&ES 20' 20' 20' 20'

## 2019-11-15 ENCOUNTER — OFFICE VISIT (OUTPATIENT)
Dept: PHYSICAL THERAPY | Facility: CLINIC | Age: 79
End: 2019-11-15
Payer: MEDICARE

## 2019-11-15 DIAGNOSIS — M47.896 OTHER SPONDYLOSIS, LUMBAR REGION: Primary | ICD-10-CM

## 2019-11-15 DIAGNOSIS — M54.50 ACUTE BILATERAL LOW BACK PAIN WITHOUT SCIATICA: ICD-10-CM

## 2019-11-15 PROCEDURE — 97110 THERAPEUTIC EXERCISES: CPT

## 2019-11-15 PROCEDURE — 97140 MANUAL THERAPY 1/> REGIONS: CPT

## 2019-11-15 PROCEDURE — 97164 PT RE-EVAL EST PLAN CARE: CPT

## 2019-11-15 PROCEDURE — 97014 ELECTRIC STIMULATION THERAPY: CPT

## 2019-11-15 NOTE — PROGRESS NOTES
PT Re-Evaluation   Today's date: 11/15/2019  Patient name: Maryjane Saez  : 1940  MRN: [id number]  Referring provider: Angel Batista DO  Dx:   Encounter Diagnosis     ICD-10-CM    1  Other spondylosis, lumbar region M47 896    2  Acute bilateral low back pain without sciatica M54 5      Assessment  Assessment details: Patient states he is feeling better and is moving better  He can perform more chores at home currently  Impairments: abnormal gait, abnormal or restricted ROM, abnormal movement, activity intolerance, impaired balance, impaired physical strength, pain with function and safety issue  Understanding of Dx/Px/POC: excellent   Prognosis: good    Goals  STG  2-4 weeks:   Increase lumbar spine AROM by 2-4 degrees  Increase lower extremity strength by 2-4 lbs in all weak areas  Improve sitting posture and body mechanics  Increase standing/ADL tolerance minutes  Decrease pain by 25-50% with standing, walking, and stairs  Initiate HEP  LTG  6-8 weeks:   Demonstrate normal lumbar rotation  Decrease pain to 1-2/10 with activity  Increase lower extremity strength by 10-20 lbs in all weak areas  Improve spinal stability  Improve gait pattern and balance  Decrease limitations with standing, walking and ADL's  DC with HEP  Plan  Plan details: All planned modality interventions and planned therapy interventions are provided PRN      Patient would benefit from: PT eval and skilled physical therapy  Planned modality interventions: TENS and unattended electrical stimulation  Planned therapy interventions: joint mobilization, manual therapy, muscle pump exercises, patient education, postural training, self care, balance, balance/weight bearing training, strengthening, stretching, therapeutic activities, therapeutic exercise, therapeutic training, transfer training, home exercise program, graded exercise, gait training, flexibility and coordination  Frequency: 3x week  Duration in weeks: 6  Treatment plan discussed with: patient      Subjective Evaluation    Pain  Quality: discomfort, pulling, radiating, tight and dull ache  Relieving factors: change in position, relaxation, rest and support  Aggravating factors: lifting, running, stair climbing, walking and standing  Progression: improved    Treatments  Previous treatment: physical therapy  Current treatment: physical therapy  Patient Goals  Patient goals for therapy: decreased pain, improved balance, increased motion, return to work, return to [employer] activities, independence with ADLs/IADLs and increased strength        Objective     Date of onset:  10/1/2019    Date of Surgery:  None Recent    History of Present Episode: 10/14/2019  Davina Arcosrichard states he has had back issues since 1996  He received surgery in 1996 and has had multiple acute episodes since  He does report falling down an embankment this summer and that flared up his back  Past Medical History:    10/14/2019  Felix reports lumbar back surgery in 1996  Heart issues  Type 2 diabetes  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  Left TKR SX     Previous Level of Functional Ability:  10/14/2019  Felix states his ability to stand and walk has become more and more of a challenge  His back is just getting worse and worse  Inspection / Palpation:  Lumbar:  10/14/2019  Mesomorphic body type  No signs of infection  No signs of wounds  No signs of drainage  No signs of ecchymotic regions  No signs of erythremic regions  Moderate signs of muscle spasm  Moderate signs of muscle guarding  Mild to moderate signs of tenderness reported to palpation  No signs of swelling  Positive signs of a surgery site  Current conditions appears consistent with recent acute episode  Chief Complaints:  10/14/2019  Felix reports mild to severe difficulty with standing  Davina Jackson reports moderate to severe difficulty with walking    Davina Jackson reports moderate to severe difficulty with movement / use of his lumbar region  Marissa Boggs reports moderate to severe difficulty with use of stairs  Marissa Boggs reports severe difficulty with running  Marissa Boggs reports severe difficulty with jumping  Marissa Boggs reports moderate to severe difficulty with use of inclines  Marissa Boggs reports mild to moderate difficulty with sleeping  Marissa Boggs reports mild to moderate difficulty with his strength and endurance  Marissa Boggs reports moderate limitations with his range of motion  Marissa Boggs reports mild difficulty lying on his lumbar region  Felix reports moderate difficulty twisting / turning his lumbar region      LUMBAR PAIN     Resting Palpation Bending Extending Walking Lifting   10/14/2019 0-4 2-6 3-6 3-6 3-9 4-9   11/15/2019 0-3 1-5 2-4 2-4 2-7 2-7     LUMBAR PAIN     Pulling Pushing Sleeping Twisting Standing   10/14/2019 3-7 3-7 2-4 4-8 3-9   11/15/2019 2-5 2-5 1-3 2-6 2-7     LUMBAR AROM Flexion Extension Rotation Right   10/14/2019 65° 5° 45°   11/15/2019 72° 7° 47°     LUMBAR AROM Rotation Left Side Bending Right Side Bending Left   10/14/2019 45° 35° 35°   11/15/2019 47° 38° 37°     LUMBAR MMT Flexion Extension Rotation Right   10/14/2019 2/10  22 lbs 3/10  27 lbs 4/10  21 lbs   11/15/2019 1/10  26 lbs 2/10  31 lbs 2/10  25 lbs     LUMBAR MMT Rotation Left Side Bend Right Side Bend Left   10/14/2019 3/10  22 lbs 3/10  23 lbs 3/10  22 lbs   11/15/2019 2/10  26 lbs 2/10  26 ;lbs 2/10  26 lbs     LE MMT Dorsiflexion Plantarflexion Knee flexion Knee extension   10/14/2019 Rt 0/10  25 lbs 0/10  28 lbs 0/10  24 lbs 0/10  25 lbs   10/14/2019 Lt 0/10  32 lbs 0/10  35 lbs 0/10  20 lbs 0/10  19 lbs     LE MMT Hip Flexion Hip Abduction Hip Adduction   10/14/2019  Rt 0/10  18 lbs 0/10  21 lbs 0/10  20 lbs   10/14/2019  Lt 0/10  19 lbs 0/10  22 lbs 0/10  21 lbs     LUMBAR SCREEN Referred Pain Sacroiliac Joint test Squish Test Straight Leg  Raise   10/14/2019 Rt Negative Negative Negative Negative   10/14/2019 Lt Negative Negative Negative Negative     LUMBAR SCREEN Valsalva Gapping Bowstring sign Hip Bursitis   10/14/2019 Rt Negative Negative Negative Negative   10/14/2019 Lt Negative Negative Negative Negative     Precautions:  Low Back Pain

## 2019-11-15 NOTE — PROGRESS NOTES
Today's date: 11/15/2019  Patient name: Pam Chery  : 1940  MRN: [id number]  Referring provider: Linda Carver DO  Dx:   Encounter Diagnosis     ICD-10-CM    1  Other spondylosis, lumbar region M47 896    2  Acute bilateral low back pain without sciatica M54 5      Subjective:  No new c/o pain today  Patient reports that next week is his last week of physical therapy  Patient pleased with his progress at this time  Patient reports that he feels that a lot of his issues come from his arthritis  Objective: See treatment log below    Assessment: Tolerated treatment well  Patient exhibited good technique with therapeutic exercises and would benefit from continued PT to increase ROM/strength and endurance to improve mobility  Decreased spasm/tigthness palpated throughout B hip and low back region  Plan: Continue per plan of care  Progress treatment as tolerated         Precautions:  Low Back Pain    Daily Treatment Log  Manual  11/6 11/8 11/11 11/13 11/15   MT, ROM 20' 30' 20' 20' 20'   HEP        Exercise Log 11/6 11/8 11/11 11/13 11/15   Balance Board 30x 30x ea 30x ea 30x ea 30x ea   Chair Squats        BOSU-Walk 5' 5' 5' 5' 5'   T/G-Squats,PF 30x 30x ea 30x ea 30x ea 30x ea   W/P-PNF,IR,ER,PU,PS:Top,Mid,Bot 10#-30x 10# 30x ea 10# 30x ea 10# 30x ea 10# 30x ea   Trimax-BP & TKE 30x ea 30x ea 30x ea 30x ea 30x ea   Saul-BP,Lats,PD,Row 30# 30x ea 35# 30x ea 35# 30x ea 35# 30x ea 30# 30x ea   Lpoufhh-HZ-Zy 2x2' 2x2' 2x2' 2x2' 2x2'   NK Table        UBC 10' 10' 10' 10' 10'   Bike        ME, PE 15' 15' 15' 15' 15'           Modalities 11/6 11/8 11/11 11/13 11/15   MH&ES 20' 20' 20' 20' 20'

## 2019-11-15 NOTE — LETTER
November 15, 2019  Signature Required / [address] / PT Reevaluation  Zehraángela PhippsDO  [address]    Patient: Carrol Watts   YOB: 1940   Date of Visit: 11/15/2019     Encounter Diagnosis     ICD-10-CM    1  Other spondylosis, lumbar region M47 896    2  Acute bilateral low back pain without sciatica M54 5      Dear Dr Abdirashid Santamaria: Thank you for your recent referral of Carrol Watts  Please review the attached evaluation summary from [address] [place] [address] 1 recent visit  Please verify that you agree with the plan of care by signing the attached order  If you have any questions or concerns, please do not hesitate to call  I sincerely appreciate the opportunity to share in the care of one of your patients and hope to have another opportunity to work with you in the near future  Sincerely,    Robson Hector, PT    Referring Provider:   I certify that I have read the below Plan of Care and certify the need for these services furnished under this plan of treatment while under my care  Zehra PhippsDO  [address]  96 : [phone]    Please SIGN ABOVE and return THIS PAGE ONLY to Fax # [phone]          Today's date: 11/15/2019  Patient name: Carrol Watts  : 1940  MRN: [id number]  Referring provider: Luis Antonio Sauceda DO  Dx:   Encounter Diagnosis     ICD-10-CM    1  Other spondylosis, lumbar region M47 896    2  Acute bilateral low back pain without sciatica M54 5      Subjective:  No new c/o pain today  Patient reports that next week is his last week of physical therapy  Patient pleased with his progress at this time  Patient reports that he feels that a lot of his issues come from his arthritis  Objective: See treatment log below    Assessment: Tolerated treatment well   Patient exhibited good technique with therapeutic exercises and would benefit from continued PT to increase ROM/strength and endurance to improve mobility  Decreased spasm/tigthness palpated throughout B hip and low back region  Plan: Continue per plan of care  Progress treatment as tolerated  Precautions:  Low Back Pain    Daily Treatment Log  Manual  11/6 11/8 11/11 11/13 11/15   MT, ROM 20' 30' 20' 20' 20'   HEP        Exercise Log 11/6 11/8 11/11 11/13 11/15   Balance Board 30x 30x ea 30x ea 30x ea 30x ea   Chair Squats        BOSU-Walk 5' 5' 5' 5' 5'   T/G-Squats,PF 30x 30x ea 30x ea 30x ea 30x ea   W/P-PNF,IR,ER,PU,PS:Top,Mid,Bot 10#-30x 10# 30x ea 10# 30x ea 10# 30x ea 10# 30x ea   Trimax-BP & TKE 30x ea 30x ea 30x ea 30x ea 30x ea   Saul-BP,Lats,PD,Row 30# 30x ea 35# 30x ea 35# 30x ea 35# 30x ea 30# 30x ea   Bqybjta-UF-Eq 2x2' 2x2' 2x2' 2x2' 2x2'   NK Table        UBC 10' 10' 10' 10' 10'   Bike        ME, PE 15' 15' 15' 15' 15'           Modalities 11/6 11/8 11/11 11/13 11/15   MH&ES 20' 20' 20' 20' 20'                 PT Re-Evaluation   Today's date: 11/15/2019  Patient name: Howard Schilder  : 1940  MRN: [id number]  Referring provider: Enmanuel Willett DO  Dx:   Encounter Diagnosis     ICD-10-CM    1  Other spondylosis, lumbar region M47 896    2  Acute bilateral low back pain without sciatica M54 5      Assessment  Assessment details: Patient states he is feeling better and is moving better  He can perform more chores at home currently  Impairments: abnormal gait, abnormal or restricted ROM, abnormal movement, activity intolerance, impaired balance, impaired physical strength, pain with function and safety issue  Understanding of Dx/Px/POC: excellent   Prognosis: good    Goals  STG  2-4 weeks:   Increase lumbar spine AROM by 2-4 degrees  Increase lower extremity strength by 2-4 lbs in all weak areas  Improve sitting posture and body mechanics  Increase standing/ADL tolerance minutes  Decrease pain by 25-50% with standing, walking, and stairs  Initiate HEP     LTG 6-8 weeks:   Demonstrate normal lumbar rotation  Decrease pain to 1-2/10 with activity  Increase lower extremity strength by 10-20 lbs in all weak areas  Improve spinal stability  Improve gait pattern and balance  Decrease limitations with standing, walking and ADL's  DC with HEP  Plan  Plan details: All planned modality interventions and planned therapy interventions are provided PRN  Patient would benefit from: PT eval and skilled physical therapy  Planned modality interventions: TENS and unattended electrical stimulation  Planned therapy interventions: joint mobilization, manual therapy, muscle pump exercises, patient education, postural training, self care, balance, balance/weight bearing training, strengthening, stretching, therapeutic activities, therapeutic exercise, therapeutic training, transfer training, home exercise program, graded exercise, gait training, flexibility and coordination  Frequency: 3x week  Duration in weeks: 6  Treatment plan discussed with: patient      Subjective Evaluation    Pain  Quality: discomfort, pulling, radiating, tight and dull ache  Relieving factors: change in position, relaxation, rest and support  Aggravating factors: lifting, running, stair climbing, walking and standing  Progression: improved    Treatments  Previous treatment: physical therapy  Current treatment: physical therapy  Patient Goals  Patient goals for therapy: decreased pain, improved balance, increased motion, return to work, return to [employer] activities, independence with ADLs/IADLs and increased strength        Objective     Date of onset:  10/1/2019    Date of Surgery:  None Recent    History of Present Episode: 10/14/2019  Jm Hernandez states he has had back issues since 1996  He received surgery in 1996 and has had multiple acute episodes since  He does report falling down an embankment this summer and that flared up his back      Past Medical History:    10/14/2019  Jm Hernandez reports lumbar back surgery in 1996  Heart issues  Type 2 diabetes  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  Left TKR SX     Previous Level of Functional Ability:  10/14/2019  Felix states his ability to stand and walk has become more and more of a challenge  His back is just getting worse and worse  Inspection / Palpation:  Lumbar:  10/14/2019  Mesomorphic body type  No signs of infection  No signs of wounds  No signs of drainage  No signs of ecchymotic regions  No signs of erythremic regions  Moderate signs of muscle spasm  Moderate signs of muscle guarding  Mild to moderate signs of tenderness reported to palpation  No signs of swelling  Positive signs of a surgery site  Current conditions appears consistent with recent acute episode  Chief Complaints:  10/14/2019  Felix reports mild to severe difficulty with standing  Aldchepe Schwab reports moderate to severe difficulty with walking  Aldchepe Schwab reports moderate to severe difficulty with movement / use of his lumbar region  Aldona Schwab reports moderate to severe difficulty with use of stairs  Aldona Schwab reports severe difficulty with running  Aldona Schwab reports severe difficulty with jumping  Aldona Schwab reports moderate to severe difficulty with use of inclines  Aldona Schwab reports mild to moderate difficulty with sleeping  Aldona Schwab reports mild to moderate difficulty with his strength and endurance  Aldchepe Schwab reports moderate limitations with his range of motion  Aldchepe Schwab reports mild difficulty lying on his lumbar region  Felix reports moderate difficulty twisting / turning his lumbar region      LUMBAR PAIN     Resting Palpation Bending Extending Walking Lifting   10/14/2019 0-4 2-6 3-6 3-6 3-9 4-9   11/15/2019 0-3 1-5 2-4 2-4 2-7 2-7     LUMBAR PAIN     Pulling Pushing Sleeping Twisting Standing   10/14/2019 3-7 3-7 2-4 4-8 3-9   11/15/2019 2-5 2-5 1-3 2-6 2-7     LUMBAR AROM Flexion Extension Rotation Right   10/14/2019 65° 5° 45°   11/15/2019 72° 7° 47° LUMBAR AROM Rotation Left Side Bending Right Side Bending Left   10/14/2019 45° 35° 35°   11/15/2019 47° 38° 37°     LUMBAR MMT Flexion Extension Rotation Right   10/14/2019 2/10  22 lbs 3/10  27 lbs 4/10  21 lbs   11/15/2019 1/10  26 lbs 2/10  31 lbs 2/10  25 lbs     LUMBAR MMT Rotation Left Side Bend Right Side Bend Left   10/14/2019 3/10  22 lbs 3/10  23 lbs 3/10  22 lbs   11/15/2019 2/10  26 lbs 2/10  26 ;lbs 2/10  26 lbs     LE MMT Dorsiflexion Plantarflexion Knee flexion Knee extension   10/14/2019 Rt 0/10  25 lbs 0/10  28 lbs 0/10  24 lbs 0/10  25 lbs   10/14/2019 Lt 0/10  32 lbs 0/10  35 lbs 0/10  20 lbs 0/10  19 lbs     LE MMT Hip Flexion Hip Abduction Hip Adduction   10/14/2019  Rt 0/10  18 lbs 0/10  21 lbs 0/10  20 lbs   10/14/2019  Lt 0/10  19 lbs 0/10  22 lbs 0/10  21 lbs     LUMBAR SCREEN Referred Pain Sacroiliac Joint test Squish Test Straight Leg  Raise   10/14/2019 Rt Negative Negative Negative Negative   10/14/2019 Lt Negative Negative Negative Negative     LUMBAR SCREEN Valsalva Gapping Bowstring sign Hip Bursitis   10/14/2019 Rt Negative Negative Negative Negative   10/14/2019 Lt Negative Negative Negative Negative     Precautions:  Low Back Pain

## 2019-11-18 ENCOUNTER — OFFICE VISIT (OUTPATIENT)
Dept: PHYSICAL THERAPY | Facility: CLINIC | Age: 79
End: 2019-11-18
Payer: MEDICARE

## 2019-11-18 DIAGNOSIS — M47.896 OTHER SPONDYLOSIS, LUMBAR REGION: Primary | ICD-10-CM

## 2019-11-18 DIAGNOSIS — M54.50 ACUTE BILATERAL LOW BACK PAIN WITHOUT SCIATICA: ICD-10-CM

## 2019-11-18 PROCEDURE — 97014 ELECTRIC STIMULATION THERAPY: CPT

## 2019-11-18 PROCEDURE — 97110 THERAPEUTIC EXERCISES: CPT

## 2019-11-18 PROCEDURE — 97140 MANUAL THERAPY 1/> REGIONS: CPT

## 2019-11-18 NOTE — PROGRESS NOTES
Today's date: 2019  Patient name: Jacqueline Turner  : 1940  MRN: [id number]  Referring provider: Ralph Ambrosio DO  Dx:   Encounter Diagnosis     ICD-10-CM    1  Other spondylosis, lumbar region M47 896    2  Acute bilateral low back pain without sciatica M54 5      Subjective:  No new c/o pain today  Objective: See treatment log below    Assessment: Tolerated treatment well  Patient exhibited good technique with therapeutic exercises and would benefit from continued PT to increase ROM/strength and endurance to improve mobility  Plan: Continue per plan of care  Progress treatment as tolerated         Precautions:  Low Back Pain    Daily Treatment Log  Manual  11/18    11/15   MT, ROM 15'    20'   HEP        Exercise Log 11/18    11/15   Balance Board 30x    30x ea   Chair Squats        BOSU-Walk 5'    5'   T/G-Squats,PF 30x    30x ea   W/P-PNF,IR,ER,PU,PS:Top,Mid,Bot 10#-30x    10# 30x ea   Trimax-BP & TKE 30x ea    30x ea   Saul-BP,Lats,PD,Row 35# 30x ea    30# 30x ea   Pdakdnb-KY-Cg 2x2'    2x2'   NK Table        UBC 10'    10'   Bike        ME, PE 15'    15'           Modalities 11/18    11/15   MH&ES 20'    20'

## 2019-11-19 ENCOUNTER — TRANSCRIBE ORDERS (OUTPATIENT)
Dept: PHYSICAL THERAPY | Facility: CLINIC | Age: 79
End: 2019-11-19

## 2019-11-19 DIAGNOSIS — M47.896 OTHER SPONDYLOSIS, LUMBAR REGION: Primary | ICD-10-CM

## 2019-11-19 DIAGNOSIS — M54.50 ACUTE BILATERAL LOW BACK PAIN WITHOUT SCIATICA: ICD-10-CM

## 2019-11-20 ENCOUNTER — OFFICE VISIT (OUTPATIENT)
Dept: PHYSICAL THERAPY | Facility: CLINIC | Age: 79
End: 2019-11-20
Payer: MEDICARE

## 2019-11-20 DIAGNOSIS — M47.896 OTHER SPONDYLOSIS, LUMBAR REGION: Primary | ICD-10-CM

## 2019-11-20 DIAGNOSIS — M54.50 ACUTE BILATERAL LOW BACK PAIN WITHOUT SCIATICA: ICD-10-CM

## 2019-11-20 PROCEDURE — 97014 ELECTRIC STIMULATION THERAPY: CPT

## 2019-11-20 PROCEDURE — 97140 MANUAL THERAPY 1/> REGIONS: CPT

## 2019-11-20 PROCEDURE — 97110 THERAPEUTIC EXERCISES: CPT

## 2019-11-20 NOTE — PROGRESS NOTES
Today's date: 2019  Patient name: Nicole Dietz  : 1940  MRN: [id number]  Referring provider: Natalee Gayle DO  Dx:   Encounter Diagnosis     ICD-10-CM    1  Other spondylosis, lumbar region M47 896    2  Acute bilateral low back pain without sciatica M54 5      Subjective:  No new c/o pain today  Objective: See treatment log below    Assessment: Tolerated treatment well  Patient exhibited good technique with therapeutic exercises and would benefit from continued PT to increase ROM/strength and endurance to improve mobility  Plan: Continue per plan of care  Progress treatment as tolerated         Precautions:  Low Back Pain    Daily Treatment Log  Manual        MT, ROM 15' 20'      HEP        Exercise Log       Balance Board 30x 30x ea      Chair Squats        BOSU-Walk 5' 5'      T/G-Squats,PF 30x 30x ea      W/P-PNF,IR,ER,PU,PS:Top,Mid,Bot 10#-30x 10# 30x ea      Trimax-BP & TKE 30x ea 30x ea      Saul-BP,Lats,PD,Row 35# 30x ea 35# 30x ea      Kngioxu-HZ-Hx 2x2' 2x2'      NK Table        UBC 10' 10'      Bike        ME, PE 15' 15'              Modalities       MH&ES 20' 20'

## 2019-11-22 ENCOUNTER — APPOINTMENT (OUTPATIENT)
Dept: PHYSICAL THERAPY | Facility: CLINIC | Age: 79
End: 2019-11-22
Payer: MEDICARE

## 2019-11-25 ENCOUNTER — OFFICE VISIT (OUTPATIENT)
Dept: PHYSICAL THERAPY | Facility: CLINIC | Age: 79
End: 2019-11-25
Payer: MEDICARE

## 2019-11-25 DIAGNOSIS — M47.896 OTHER SPONDYLOSIS, LUMBAR REGION: Primary | ICD-10-CM

## 2019-11-25 DIAGNOSIS — M54.50 ACUTE BILATERAL LOW BACK PAIN WITHOUT SCIATICA: ICD-10-CM

## 2019-11-25 PROCEDURE — 97110 THERAPEUTIC EXERCISES: CPT

## 2019-11-25 PROCEDURE — 97140 MANUAL THERAPY 1/> REGIONS: CPT

## 2019-11-25 PROCEDURE — 97014 ELECTRIC STIMULATION THERAPY: CPT

## 2019-11-25 NOTE — LETTER
2019  Signature Required / [address] / PT Discharge  DO Alhaji Arevalo  [address] [place] [address]    Patient: Sienna Olson   YOB: 1940   Date of Visit: 2019     Encounter Diagnosis     ICD-10-CM    1  Other spondylosis, lumbar region M47 896    2  Acute bilateral low back pain without sciatica M54 5      Dear Dr Milana Jay: Thank you for your recent referral of Sienna Olson  Please review the attached evaluation summary from [address] [place] [address] recent visit  Please verify that you agree with the plan of care by signing the attached order  If you have any questions or concerns, please do not hesitate to call  I sincerely appreciate the opportunity to share in the care of one of your patients and hope to have another opportunity to work with you in the near future  Sincerely,    Mariano Serrato, PT    Referring Provider:      I certify that I have read the below Plan of Care and certify the need for these services furnished under this plan of treatment while under my care  DO Alhaji Arevalo  [address] [place] [address]  96 : [phone]    Please SIGN ABOVE and return THIS PAGE ONLY to Fax # [phone]        Today's date: 2019  Patient name: Sienna Olson  : 1940  MRN: [id number]  Referring provider: Ross Akins DO  Dx:   Encounter Diagnosis     ICD-10-CM    1  Other spondylosis, lumbar region M47 896    2  Acute bilateral low back pain without sciatica M54 5      Subjective:  No new c/o pain today  "Today is my last therapy appt until I see my dr "    Objective: See treatment log below    Assessment: Tolerated treatment well  Patient exhibited good technique with therapeutic exercises and would benefit from continued PT to increase ROM/strength and endurance to improve mobility  Plan: Continue per plan of care  Progress treatment as tolerated         Precautions:  Low Back Pain    Daily Treatment Log  Manual       MT, ROM 15' 20' 20'     HEP        Exercise Log      Balance Board 30x 30x ea 30x ea     Chair Squats        BOSU-Walk 5' 5' 5'     T/G-Squats,PF 30x 30x ea 30x ea     W/P-PNF,IR,ER,PU,PS:Top,Mid,Bot 10#-30x 10# 30x ea 10# 30x ea     Trimax-BP & TKE 30x ea 30x ea 30x ea     Saul-BP,Lats,PD,Row 35# 30x ea 35# 30x ea 35# 30x ea     Ontvdzx-TB-Mm 2x2' 2x2' 2x2'     NK Table        UBC 10' 10' 10'     Bike        ME, PE 15' 15' 15'             Modalities      MH&ES 20' 20' 20'                   PT Discharge  Today's date: 12/10/2019  Patient name: Madiha Knight  : 1940  MRN: [id number]  Referring provider: Mona Villar DO  Dx:   Encounter Diagnosis     ICD-10-CM    1  Other spondylosis, lumbar region M47 896    2  Acute bilateral low back pain without sciatica M54 5      Assessment/Plan    Subjective    Objective     12/10/2019  Madiha Knight has not returned for more treatment  Madiha Knight did not attend today's appointment  I cannot provide you with a current progress report but I can provide you with information based on previous performance  Madiha Knight is discharged at this time

## 2019-11-25 NOTE — PROGRESS NOTES
Today's date: 2019  Patient name: Mari Jamison  : 1940  MRN: [id number]  Referring provider: Ranjeet Westfall DO  Dx:   Encounter Diagnosis     ICD-10-CM    1  Other spondylosis, lumbar region M47 896    2  Acute bilateral low back pain without sciatica M54 5      Subjective:  No new c/o pain today  "Today is my last therapy appt until I see my dr "    Objective: See treatment log below    Assessment: Tolerated treatment well  Patient exhibited good technique with therapeutic exercises and would benefit from continued PT to increase ROM/strength and endurance to improve mobility  Plan: Continue per plan of care  Progress treatment as tolerated         Precautions:  Low Back Pain    Daily Treatment Log  Manual       MT, ROM 15' 20' 20'     HEP        Exercise Log      Balance Board 30x 30x ea 30x ea     Chair Squats        BOSU-Walk 5' 5' 5'     T/G-Squats,PF 30x 30x ea 30x ea     W/P-PNF,IR,ER,PU,PS:Top,Mid,Bot 10#-30x 10# 30x ea 10# 30x ea     Trimax-BP & TKE 30x ea 30x ea 30x ea     Saul-BP,Lats,PD,Row 35# 30x ea 35# 30x ea 35# 30x ea     Yqqosku-WB-Ct 2x2' 2x2' 2x2'     NK Table        UBC 10' 10' 10'     Bike        ME, PE 15' 15' 15'             Modalities      MH&ES 20' 20' 20'

## 2019-12-10 NOTE — PROGRESS NOTES
PT Discharge  Today's date: 12/10/2019  Patient name: Mari Jamison  : 1940  MRN: [id number]  Referring provider: Ranjeet Westfall DO  Dx:   Encounter Diagnosis     ICD-10-CM    1  Other spondylosis, lumbar region M47 896    2  Acute bilateral low back pain without sciatica M54 5      Assessment/Plan    Subjective    Objective     12/10/2019  Mari Jamison has not returned for more treatment  Mari Jamison did not attend today's appointment  I cannot provide you with a current progress report but I can provide you with information based on previous performance  Mari Jamison is discharged at this time

## 2019-12-10 NOTE — PROGRESS NOTES
PT Re-Evaluation   Today's date: 2019    Patient name: Maryjane Saez  : 1940  MRN: [id number]  Referring provider: Angel Batista DO  Dx:   Encounter Diagnosis     ICD-10-CM    1  Other spondylosis, lumbar region M47 896    2  Acute bilateral low back pain without sciatica M54 5      Assessment  Assessment details: Patient states he is feeling better  He still has limitations and pain but is getting better  Impairments: abnormal gait, abnormal or restricted ROM, abnormal movement, activity intolerance, impaired balance, pain with function and safety issue  Understanding of Dx/Px/POC: excellent   Prognosis: good    Goals  STG  2-4 weeks:   Increase lumbar spine AROM by 2-4 degrees  Increase lower extremity strength by 2-4 lbs in all weak areas  Improve sitting posture and body mechanics  Increase standing/ADL tolerance minutes  Decrease pain by 25-50% with standing, walking, and stairs  Initiate HEP  LTG  6-8 weeks:   Demonstrate normal lumbar rotation  Decrease pain to 1-2/10 with activity  Increase lower extremity strength by 10-20 lbs in all weak areas  Improve spinal stability  Improve gait pattern and balance  Decrease limitations with standing, walking and ADL's  DC with HEP  Plan  Plan details: All planned modality interventions and planned therapy interventions are provided PRN    Patient would benefit from: PT eval and skilled physical therapy  Planned modality interventions: unattended electrical stimulation and TENS  Planned therapy interventions: joint mobilization, manual therapy, balance, balance/weight bearing training, neuromuscular re-education, patient education, postural training, self care, stretching, therapeutic activities, therapeutic exercise, therapeutic training, coordination, flexibility, transfer training, graded exercise, gait training and home exercise program  Frequency: 3x week  Duration in weeks: 6  Treatment plan discussed with: patient      Subjective Evaluation    Pain  Quality: discomfort, knife-like, pulling, squeezing, sharp, radiating and tight  Aggravating factors: lifting, running, stair climbing, walking, standing and sitting  Progression: improved    Treatments  Previous treatment: physical therapy  Current treatment: physical therapy  Patient Goals  Patient goals for therapy: decreased pain, improved balance, increased motion, return to work, return to [employer] activities, independence with ADLs/IADLs and increased strength        Objective     Date of onset:  10/1/2019    Date of Surgery:  None Recent    History of Present Episode: 10/14/2019  Tanya Santizo states he has had back issues since 1996  He received surgery in 1996 and has had multiple acute episodes since  He does report falling down an embankment this summer and that flared up his back  Past Medical History:    10/14/2019  Felix reports lumbar back surgery in 1996  Heart issues  Type 2 diabetes  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  Left TKR SX     Previous Level of Functional Ability:  10/14/2019  Felix states his ability to stand and walk has become more and more of a challenge  His back is just getting worse and worse  Inspection / Palpation:  Lumbar:  10/14/2019  Mesomorphic body type  No signs of infection  No signs of wounds  No signs of drainage  No signs of ecchymotic regions  No signs of erythremic regions  Moderate signs of muscle spasm  Moderate signs of muscle guarding  Mild to moderate signs of tenderness reported to palpation  No signs of swelling  Positive signs of a surgery site  Current conditions appears consistent with recent acute episode  Chief Complaints:  10/14/2019  Felix reports mild to severe difficulty with standing  Tanya Santizo reports moderate to severe difficulty with walking  Tanya Santizo reports moderate to severe difficulty with movement / use of his lumbar region    Tanya Santizo reports moderate to severe difficulty with use of stairs  Miki Nguyen reports severe difficulty with running  Miki Nguyen reports severe difficulty with jumping  Miki Nguyen reports moderate to severe difficulty with use of inclines  Miki Nguyen reports mild to moderate difficulty with sleeping  Miki Nguyen reports mild to moderate difficulty with his strength and endurance  Miki Nguyen reports moderate limitations with his range of motion  Miki Nguyen reports mild difficulty lying on his lumbar region  Felix reports moderate difficulty twisting / turning his lumbar region      LUMBAR PAIN     Resting Palpation Bending Extending Walking Lifting   10/14/2019 0-4 2-6 3-6 3-6 3-9 4-9     LUMBAR PAIN     Pulling Pushing Sleeping Twisting Standing   10/14/2019 3-7 3-7 2-4 4-8 3-9     LUMBAR AROM Flexion Extension Rotation Right   10/14/2019 65° 5° 45°     LUMBAR AROM Rotation Left Side Bending Right Side Bending Left   10/14/2019 45° 35° 35°     LUMBAR MMT Flexion Extension Rotation Right   10/14/2019 2/10  22 lbs 3/10  27 lbs 4/10  21 lbs     LUMBAR MMT Rotation Left Side Bend Right Side Bend Left   10/14/2019 3/10  22 lbs 3/10  23 lbs 3/10  22 lbs     LE MMT Dorsiflexion Plantarflexion Knee flexion Knee extension   10/14/2019 Rt 0/10  25 lbs 0/10  28 lbs 0/10  24 lbs 0/10  25 lbs   10/14/2019 Lt 0/10  32 lbs 0/10  35 lbs 0/10  20 lbs 0/10  19 lbs     LE MMT Hip Flexion Hip Abduction Hip Adduction   10/14/2019  Rt 0/10  18 lbs 0/10  21 lbs 0/10  20 lbs   10/14/2019  Lt 0/10  19 lbs 0/10  22 lbs 0/10  21 lbs     LUMBAR SCREEN Referred Pain Sacroiliac Joint test Squish Test Straight Leg  Raise   10/14/2019 Rt Negative Negative Negative Negative   10/14/2019 Lt Negative Negative Negative Negative     LUMBAR SCREEN Valsalva Gapping Bowstring sign Hip Bursitis   10/14/2019 Rt Negative Negative Negative Negative   10/14/2019 Lt Negative Negative Negative Negative     Precautions:  Low Back Pain

## 2019-12-11 ENCOUNTER — TRANSCRIBE ORDERS (OUTPATIENT)
Dept: PHYSICAL THERAPY | Facility: CLINIC | Age: 79
End: 2019-12-11

## 2019-12-11 DIAGNOSIS — M47.896 OTHER SPONDYLOSIS, LUMBAR REGION: Primary | ICD-10-CM

## 2019-12-11 DIAGNOSIS — M54.50 ACUTE BILATERAL LOW BACK PAIN WITHOUT SCIATICA: ICD-10-CM

## 2019-12-19 ENCOUNTER — OFFICE VISIT (OUTPATIENT)
Dept: FAMILY MEDICINE CLINIC | Facility: CLINIC | Age: 79
End: 2019-12-19
Payer: MEDICARE

## 2019-12-19 VITALS
WEIGHT: 170 LBS | DIASTOLIC BLOOD PRESSURE: 76 MMHG | HEIGHT: 62 IN | SYSTOLIC BLOOD PRESSURE: 126 MMHG | BODY MASS INDEX: 31.28 KG/M2

## 2019-12-19 DIAGNOSIS — E11.9 TYPE 2 DIABETES MELLITUS WITHOUT COMPLICATION, WITHOUT LONG-TERM CURRENT USE OF INSULIN (HCC): Primary | Chronic | ICD-10-CM

## 2019-12-19 DIAGNOSIS — I25.810 CORONARY ARTERY DISEASE INVOLVING CORONARY BYPASS GRAFT OF NATIVE HEART WITHOUT ANGINA PECTORIS: Chronic | ICD-10-CM

## 2019-12-19 DIAGNOSIS — E03.9 ACQUIRED HYPOTHYROIDISM: Chronic | ICD-10-CM

## 2019-12-19 DIAGNOSIS — M15.9 PRIMARY OSTEOARTHRITIS INVOLVING MULTIPLE JOINTS: Chronic | ICD-10-CM

## 2019-12-19 DIAGNOSIS — I10 ESSENTIAL HYPERTENSION: Chronic | ICD-10-CM

## 2019-12-19 PROCEDURE — 99214 OFFICE O/P EST MOD 30 MIN: CPT | Performed by: FAMILY MEDICINE

## 2019-12-19 NOTE — PATIENT INSTRUCTIONS
Overall seems to be doing well the back is somewhat better after extensive physical therapy    Has been off his blood pressure medication and should restart the lisinopril will continue other meds as is

## 2019-12-19 NOTE — PROGRESS NOTES
Assessment/Plan:    Type 2 diabetes mellitus without complication, without long-term current use of insulin ([address] )  Has been showing good control continue to watch diet and push activity limiting starches to 1 medium serving per meal and recheck A1c before next visit  Lab Results   Component Value Date    HGBA1C 6 7 09/17/2019       Coronary artery disease involving coronary bypass graft of native heart without angina pectoris  Overall appears stable continue current meds and follow-up  Push activity as tolerated    Essential hypertension  Stable continue current regimen    Acquired hypothyroidism  Overall stable continue baseline meds    Primary osteoarthritis involving multiple joints  Continue follow-up with Orthopedics but I do think needs to be more careful with his activity continue p r n  Meds       Diagnoses and all orders for this visit:    Type 2 diabetes mellitus without complication, without long-term current use of insulin ([address] )  -     Ambulatory referral to Ophthalmology; Future  -     HEMOGLOBIN A1C W/ EAG ESTIMATION; Future    Coronary artery disease involving coronary bypass graft of native heart without angina pectoris    Essential hypertension    Acquired hypothyroidism    Primary osteoarthritis involving multiple joints          Subjective:      Patient ID: Rosalie Ashby is a 70 y o  male  Patient has history of hypertension, hypothyroidism, coronary artery disease, diabetes mellitus adult and degenerative arthritis continues to have some trouble with his knees and intermittently with the low back although remains extremely active repairing trucks      The following portions of the patient's history were reviewed and updated as appropriate: allergies, current medications, past medical history, past social history, past surgical history and problem list     Review of Systems   Constitutional: Negative for activity change, appetite change, chills, fatigue, fever and unexpected weight change  HENT: Negative for congestion, rhinorrhea, trouble swallowing and voice change  Eyes: Negative for visual disturbance  Respiratory: Negative for apnea, cough, chest tightness and shortness of breath  Cardiovascular: Negative for chest pain, palpitations and leg swelling  Gastrointestinal: Negative for abdominal distention, abdominal pain, constipation and diarrhea  Endocrine: Negative for polyuria ( nocturia x1)  Genitourinary: Negative for difficulty urinating and enuresis  Musculoskeletal: Positive for arthralgias (Knee pain and intermittent low back pain)  Negative for myalgias  Skin: Negative for rash  Allergic/Immunologic: Negative for environmental allergies  Neurological: Negative for dizziness, weakness, light-headedness, numbness and headaches  Hematological: Negative for adenopathy  Psychiatric/Behavioral: Negative for agitation and confusion  Objective:      /76 (BP Location: Right arm, Patient Position: Sitting, Cuff Size: Large)   Ht 5' 2" (1 575 m)   Wt 77 1 kg (170 lb)   BMI 31 09 kg/m²          Physical Exam   Constitutional: He is oriented to person, place, and time  He appears well-developed  HENT:   Head: Normocephalic  Nose: Nose normal    Mouth/Throat: Oropharynx is clear and moist    Eyes: Conjunctivae are normal    Neck: Neck supple  No thyromegaly present  Cardiovascular: Normal rate, regular rhythm, normal heart sounds and intact distal pulses  No murmur (Mid systolic murmur at left sternal border heart rate is 66 no bruits are noted) heard  Pulmonary/Chest: Effort normal and breath sounds normal    Abdominal: Soft  He exhibits no distension and no mass  There is no tenderness  Musculoskeletal: He exhibits no edema  Lymphadenopathy:     He has no cervical adenopathy  Neurological: He is alert and oriented to person, place, and time  He displays abnormal reflex ( reflexes 1+)  Coordination normal    Skin: Skin is warm and dry   No rash noted  Psychiatric: He has a normal mood and affect  Nursing note and vitals reviewed

## 2019-12-20 PROBLEM — M15.0 PRIMARY OSTEOARTHRITIS INVOLVING MULTIPLE JOINTS: Chronic | Status: ACTIVE | Noted: 2019-12-20

## 2019-12-20 PROBLEM — M15.9 PRIMARY OSTEOARTHRITIS INVOLVING MULTIPLE JOINTS: Chronic | Status: ACTIVE | Noted: 2019-12-20

## 2019-12-20 NOTE — ASSESSMENT & PLAN NOTE
Has been showing good control continue to watch diet and push activity limiting starches to 1 medium serving per meal and recheck A1c before next visit  Lab Results   Component Value Date    HGBA1C 6 7 09/17/2019

## 2019-12-20 NOTE — ASSESSMENT & PLAN NOTE
Continue follow-up with Orthopedics but I do think needs to be more careful with his activity continue p r n   Meds

## 2020-01-10 DIAGNOSIS — I10 ESSENTIAL HYPERTENSION: Primary | Chronic | ICD-10-CM

## 2020-01-10 RX ORDER — LISINOPRIL AND HYDROCHLOROTHIAZIDE 12.5; 1 MG/1; MG/1
1 TABLET ORAL DAILY
Qty: 90 TABLET | Refills: 3 | Status: SHIPPED | OUTPATIENT
Start: 2020-01-10 | End: 2021-01-13 | Stop reason: SDUPTHER

## 2020-01-23 NOTE — PLAN OF CARE
Called pt and informed him that he has anesthesia appt tomorrow Friday 1/24 2pm with [facility]. For TACE procedure on Weds 1/29    He verbalized understanding of this information.     Priscila BEARD RN, BSN  Interventional Radiology Nurse Coordinator   Phone: [phone]     Problem: PHYSICAL THERAPY ADULT  Goal: Performs mobility at highest level of function for planned discharge setting  See evaluation for individualized goals  Treatment/Interventions: Functional transfer training, LE strengthening/ROM, Elevations, Therapeutic exercise, Endurance training, Patient/family training, Bed mobility, Gait training, Spoke to nursing  Equipment Recommended: Meme Grigsby       See flowsheet documentation for full assessment, interventions and recommendations  Outcome: Adequate for Discharge  Prognosis: Excellent  Problem List: Decreased strength, Decreased range of motion, Impaired balance, Pain  Assessment: Pt seen for PT per POC  Pt continue to show good progress in PT  See above levels of assistance required for all functional tasks  Gait WNL w/ RW  Pt able to negotiate full flight of stairs w/ S w/ proper sequencing & safety techniques w/o needing cues  Pt tolerated above mentioned thera  ex well, AROM  Pt have met set PT goals in this setting hence may return home when medically cleared  Pt instructed on HEP, car transfers, home safety & mgt w/ good understanding  Pt's wife present t/o session & able to observe & instructed on same  Pt & wife offered no further questions  Written HEP given  Will now recommend OPPT for D/C as per pt's preference  Will need RW & BSC upon D/C  CM & Rn notified  Nsg staff to continue to mobilized pt (OOB in chair for all meals & ambulate in room/unit) as tolerated to prevent decline in function  Nsg notified  Barriers to Discharge: None     Recommendation: Outpatient PT, Home with family support     PT - OK to Discharge: Yes    See flowsheet documentation for full assessment

## 2020-03-24 ENCOUNTER — OFFICE VISIT (OUTPATIENT)
Dept: FAMILY MEDICINE CLINIC | Facility: CLINIC | Age: 80
End: 2020-03-24
Payer: MEDICARE

## 2020-03-24 VITALS
SYSTOLIC BLOOD PRESSURE: 128 MMHG | WEIGHT: 163 LBS | BODY MASS INDEX: 30 KG/M2 | HEIGHT: 62 IN | DIASTOLIC BLOOD PRESSURE: 78 MMHG

## 2020-03-24 DIAGNOSIS — I25.810 CORONARY ARTERY DISEASE INVOLVING CORONARY BYPASS GRAFT OF NATIVE HEART WITHOUT ANGINA PECTORIS: Chronic | ICD-10-CM

## 2020-03-24 DIAGNOSIS — E11.9 TYPE 2 DIABETES MELLITUS WITHOUT COMPLICATION, WITHOUT LONG-TERM CURRENT USE OF INSULIN (HCC): Primary | Chronic | ICD-10-CM

## 2020-03-24 DIAGNOSIS — I10 ESSENTIAL HYPERTENSION: Chronic | ICD-10-CM

## 2020-03-24 DIAGNOSIS — E03.9 ACQUIRED HYPOTHYROIDISM: Chronic | ICD-10-CM

## 2020-03-24 DIAGNOSIS — M15.9 PRIMARY OSTEOARTHRITIS INVOLVING MULTIPLE JOINTS: Chronic | ICD-10-CM

## 2020-03-24 PROBLEM — M17.12 OSTEOARTHROSIS, LOCALIZED, PRIMARY, KNEE, LEFT: Chronic | Status: RESOLVED | Noted: 2018-01-17 | Resolved: 2020-03-24

## 2020-03-24 LAB — SL AMB POCT HEMOGLOBIN AIC: 6.6 (ref ?–6.5)

## 2020-03-24 PROCEDURE — 3074F SYST BP LT 130 MM HG: CPT | Performed by: FAMILY MEDICINE

## 2020-03-24 PROCEDURE — 99214 OFFICE O/P EST MOD 30 MIN: CPT | Performed by: FAMILY MEDICINE

## 2020-03-24 PROCEDURE — 3008F BODY MASS INDEX DOCD: CPT | Performed by: FAMILY MEDICINE

## 2020-03-24 PROCEDURE — 1160F RVW MEDS BY RX/DR IN RCRD: CPT | Performed by: FAMILY MEDICINE

## 2020-03-24 PROCEDURE — 1036F TOBACCO NON-USER: CPT | Performed by: FAMILY MEDICINE

## 2020-03-24 PROCEDURE — 83036 HEMOGLOBIN GLYCOSYLATED A1C: CPT | Performed by: FAMILY MEDICINE

## 2020-03-24 PROCEDURE — 3044F HG A1C LEVEL LT 7.0%: CPT | Performed by: FAMILY MEDICINE

## 2020-03-24 PROCEDURE — 3078F DIAST BP <80 MM HG: CPT | Performed by: FAMILY MEDICINE

## 2020-03-24 PROCEDURE — 4040F PNEUMOC VAC/ADMIN/RCVD: CPT | Performed by: FAMILY MEDICINE

## 2020-03-24 NOTE — PROGRESS NOTES
Assessment/Plan:    Type 2 diabetes mellitus without complication, without long-term current use of insulin (HCC)  Showing good control with diet continue to watch starch in diet and push walking activity  Lab Results   Component Value Date    HGBA1C 6 6 (A) 03/24/2020       Essential hypertension  Overall stable continue current regimen reviewed labs which were okay    Coronary artery disease involving coronary bypass graft of native heart without angina pectoris  Overall doing well continue current regimen  Push activity as tolerated    Acquired hypothyroidism  Will check thyroid levels and adjust meds accordingly    Primary osteoarthritis involving multiple joints  Appears stable continue p r n  Meds  Push activity as tolerated       Diagnoses and all orders for this visit:    Type 2 diabetes mellitus without complication, without long-term current use of insulin (HCC)  -     POCT hemoglobin A1c    Acquired hypothyroidism  -     TSH + Free T4; Future    Coronary artery disease involving coronary bypass graft of native heart without angina pectoris    Essential hypertension    Primary osteoarthritis involving multiple joints          Subjective:      Patient ID: Maryjane Saez is a 70 y o  male  Patient has history of hypertension, hypothyroidism, coronary artery disease, diabetes mellitus adult and degenerative arthritis      The following portions of the patient's history were reviewed and updated as appropriate: allergies, current medications, past medical history, past social history, past surgical history and problem list BMI Counseling: Body mass index is 29 81 kg/m²  The BMI is above normal  Nutrition recommendations include moderation in carbohydrate intake  Exercise recommendations include moderate physical activity 150 minutes/week  No pharmacotherapy was ordered              Review of Systems   Constitutional: Negative for activity change, appetite change, chills, fatigue, fever and unexpected weight change  HENT: Negative for congestion and rhinorrhea  Eyes: Negative for visual disturbance  Respiratory: Negative for apnea, cough, chest tightness and shortness of breath  Cardiovascular: Negative for chest pain, palpitations and leg swelling  Gastrointestinal: Negative for abdominal distention, abdominal pain, constipation and diarrhea  Endocrine: Negative for polyuria ( nocturia times 0-1)  Genitourinary: Negative for difficulty urinating  Musculoskeletal: Positive for arthralgias (Primarily with his back)  Negative for myalgias  Skin: Negative for rash  Allergic/Immunologic: Negative for environmental allergies  Neurological: Positive for dizziness and light-headedness  Negative for weakness, numbness and headaches  Hematological: Negative for adenopathy  Psychiatric/Behavioral: Positive for sleep disturbance  Negative for agitation  Objective:      /78 (BP Location: Right arm, Patient Position: Sitting, Cuff Size: Standard)   Ht 5' 2" (1 575 m)   Wt 73 9 kg (163 lb)   BMI 29 81 kg/m²          Physical Exam   Constitutional: He is oriented to person, place, and time  He appears well-developed and well-nourished  No distress  HENT:   Head: Normocephalic  Nose: Nose normal    Mouth/Throat: Oropharynx is clear and moist    Eyes: Conjunctivae are normal    Neck: Neck supple  No thyromegaly present  Cardiovascular: Normal rate, regular rhythm and intact distal pulses  Murmur (Mid systolic murmur at left sternal border heart rate is 72  No bruits are noted) heard  Pulmonary/Chest: Effort normal and breath sounds normal    Abdominal: Soft  He exhibits no distension and no mass  There is no tenderness  Musculoskeletal: Normal range of motion  He exhibits no edema  Lymphadenopathy:     He has no cervical adenopathy  Neurological: He is alert and oriented to person, place, and time  He displays abnormal reflex   Coordination normal    Skin: Skin is warm and dry  No rash noted  Psychiatric: He has a normal mood and affect  His behavior is normal  Judgment and thought content normal    Nursing note and vitals reviewed

## 2020-03-24 NOTE — ASSESSMENT & PLAN NOTE
Showing good control with diet continue to watch starch in diet and push walking activity    Lab Results   Component Value Date    HGBA1C 6 6 (A) 03/24/2020

## 2020-07-03 ENCOUNTER — OFFICE VISIT (OUTPATIENT)
Dept: URGENT CARE | Facility: CLINIC | Age: 80
End: 2020-07-03
Payer: MEDICARE

## 2020-07-03 VITALS
SYSTOLIC BLOOD PRESSURE: 148 MMHG | OXYGEN SATURATION: 96 % | HEIGHT: 63 IN | RESPIRATION RATE: 18 BRPM | HEART RATE: 58 BPM | BODY MASS INDEX: 28.88 KG/M2 | WEIGHT: 163 LBS | DIASTOLIC BLOOD PRESSURE: 94 MMHG | TEMPERATURE: 97.3 F

## 2020-07-03 DIAGNOSIS — T15.02XA CORNEAL FOREIGN BODY, LEFT, INITIAL ENCOUNTER: Primary | ICD-10-CM

## 2020-07-03 PROCEDURE — 99213 OFFICE O/P EST LOW 20 MIN: CPT | Performed by: EMERGENCY MEDICINE

## 2020-07-03 PROCEDURE — 65220 REMOVE FOREIGN BODY FROM EYE: CPT | Performed by: EMERGENCY MEDICINE

## 2020-07-03 PROCEDURE — G0463 HOSPITAL OUTPT CLINIC VISIT: HCPCS | Performed by: EMERGENCY MEDICINE

## 2020-07-03 RX ORDER — POLYMYXIN B SULFATE AND TRIMETHOPRIM 1; 10000 MG/ML; [USP'U]/ML
1 SOLUTION OPHTHALMIC EVERY 6 HOURS
Qty: 10 ML | Refills: 0 | Status: SHIPPED | OUTPATIENT
Start: 2020-07-03 | End: 2020-07-10

## 2020-07-04 NOTE — PROGRESS NOTES
[facility]        NAME: Belem Juarez is a 70 y o  male  : 1940    MRN: [id number]  DATE: July 3, 2020  TIME: 8:51 PM    Assessment and Plan   Corneal foreign body, left, initial encounter Afshin Duran  1  Corneal foreign body, left, initial encounter  Ambulatory referral to Ophthalmology    polymyxin b-trimethoprim (POLYTRIM) ophthalmic solution     After informed verbal consent tetracaine 2 drops left eye then fluorescein 1 drop left eye, no complications  Foreign body removal  Date/Time: 7/3/2020 8:50 PM  Performed by: Yasir Bowie MD  Authorized by: Yasir Bowie MD   Universal Protocol:Consent: Verbal consent obtained  Risks and benefits: risks, benefits and alternatives were discussed  Consent given by: patient  Patient understanding: patient states understanding of the procedure being performed  Patient consent: the patient's understanding of the procedure matches consent given  Procedure consent: procedure consent matches procedure scheduled  Patient identity confirmed: verbally with patient    Body area: eye  Location details: left cornea    Anesthesia:  Local Anesthetic: tetracaine drops  Anesthetic total (drops): 2  Sedation:  Patient sedated: no  Patient restrained: no  Patient cooperative: yes  Localization method: eyelid eversion, magnification and visualized  Removal mechanism: moist cotton swab  Eye examined with fluorescein  Fluorescein uptake  Corneal abrasion size: small  Corneal abrasion location: medial  No residual rust ring present  Dressing: antibiotic drops  Depth: superficial  Complexity: simple  1 objects recovered  Objects recovered: dirt  Post-procedure assessment: foreign body removed  Patient tolerance: Patient tolerated the procedure well with no immediate complications        Patient Instructions     Patient Instructions     Corneal Abrasion   WHAT YOU NEED TO KNOW:   A corneal abrasion is a scratch on the cornea of your eye   The cornea is the clear layer that covers the front of your eye  A small scratch may heal in 1 to 2 days  Deeper or larger scratches may take longer to heal         DISCHARGE INSTRUCTIONS:   Contact your healthcare provider if:   · Your eye pain or vision gets worse  · You have yellow or green drainage from your eye  · You have questions or concerns about your condition or care  Medicines:   · Medicines  may be given in the form of eyedrops or ointment to help prevent an eye infection  You may also be given eye drops to decrease pain  Ask how to take this medicine safely  · Take your medicine as directed  Contact your healthcare provider if you think your medicine is not helping or if you have side effects  Tell him or her if you are allergic to any medicine  Keep a list of the medicines, vitamins, and herbs you take  Include the amounts, and when and why you take them  Bring the list or the pill bottles to follow-up visits  Carry your medicine list with you in case of an emergency  Follow up with your healthcare provider as directed:  Write down your questions so you remember to ask them during your visits  Self-care:   · Do not touch or rub your eye  · Ask your healthcare provider when you can start your normal activities  · Ask your healthcare provider when you can wear your contact lenses  · Wear sunglasses in bright light until your eyes feel better  Help prevent corneal abrasions:   · Remove your contact lenses if your eyes feel dry or irritated  · Wash your hands if you need to touch your eyes or your face  · Trim your child's fingernails so he cannot scratch his eye  · Wear protective eyewear when you work with chemicals, wood, dust, or metal      · Wear protective eyewear when you play sports  · Do not wear your contacts for longer than you should  · Do not wear colored lenses or lenses with shapes on them  These lenses may cause eye damage and vision loss       · Do not wear glitter makeup  Glitter can easily get into your eyes and under contact lenses  · Do not sleep with your contacts in your eyes  © 2017 [facility] Information is for End User's use only and may not be sold, redistributed or otherwise used for commercial purposes  All illustrations and images included in CareNotes® are the copyrighted property of [employer]  or Hermann [employer]  The above information is an  only  It is not intended as medical advice for individual conditions or treatments  Talk to your doctor, nurse or pharmacist before following any medical regimen to see if it is safe and effective for you  Follow up with PCP in 3-5 days  Proceed to  ER if symptoms worsen  Chief Complaint     Chief Complaint   Patient presents with    Eye Problem     Got dirt in eye around 0900am, states it hurts when he blinks  Denies blurry vision or photophobia         History of Present Illness       Patient complains of foreign body sensation right eye since dirt got into his eye 11 hours ago  Review of Systems   Review of Systems   Constitutional: Negative for chills and fever  Eyes: Positive for pain and redness  Negative for visual disturbance  Skin: Negative for rash  Neurological: Negative for headaches           Current Medications       Current Outpatient Medications:     aspirin 325 mg tablet, Take 1 tablet by mouth 2 (two) times a day, Disp: , Rfl: 0    levothyroxine 25 mcg tablet, Take 1 tablet (25 mcg total) by mouth daily, Disp: 90 tablet, Rfl: 3    lisinopril-hydrochlorothiazide (PRINZIDE,ZESTORETIC) 10-12 5 MG per tablet, Take 1 tablet by mouth daily, Disp: 90 tablet, Rfl: 3    Magnesium 400 MG CAPS, Take 1 capsule by mouth daily, Disp: , Rfl:     Multiple Vitamin (MULTIVITAMIN) capsule, Take 1 capsule by mouth daily, Disp: , Rfl:     Omega-3 Fatty Acids (FISH OIL) 1200 MG CAPS, Take by mouth, Disp: , Rfl:     simvastatin (ZOCOR) 40 mg tablet, Take 1 tablet (40 mg total) by mouth daily at bedtime, Disp: 90 tablet, Rfl: 3    polymyxin b-trimethoprim (POLYTRIM) ophthalmic solution, Administer 1 drop into the left eye every 6 (six) hours for 7 days, Disp: 10 mL, Rfl: 0    Current Allergies     Allergies as of 07/03/2020 - Reviewed 07/03/2020   Allergen Reaction Noted    Percocet [oxycodone-acetaminophen] Itching 01/17/2018            The following portions of the patient's history were reviewed and updated as appropriate: allergies, current medications, past family history, past medical history, past social history, past surgical history and problem list      Past Medical History:   Diagnosis Date    Chronic ischemic heart disease     Coronary artery disease     hx cabg x4 2007    Hearing loss     Hyperlipidemia     Hypertension     Hypokalemia     Hypothyroid     OA (osteoarthritis)     left knee    Prediabetes     S/P AVR     2007    Type 2 diabetes mellitus ([address] )     Wears dentures     full upper    Wears glasses        Past Surgical History:   Procedure Laterality Date    AORTIC VALVE REPLACEMENT      BACK SURGERY      lumbar     CATARACT EXTRACTION Bilateral     CORONARY ARTERY BYPASS GRAFT      x4    2007    KNEE ARTHROSCOPY Bilateral     IL TOTAL KNEE ARTHROPLASTY Left 1/17/2018    Procedure: ARTHROPLASTY KNEE TOTAL;  Surgeon: Chris Damon MD;  Location: [facility] OR;  Service: Orthopedics    SHOULDER ARTHROSCOPY Right        Family History   Problem Relation Age of Onset    Cancer Mother          Medications have been verified  Objective   /94   Pulse 58   Temp (!) 97 3 °F (36 3 °C) (Tympanic)   Resp 18   Ht 5' 3" (1 6 m)   Wt 73 9 kg (163 lb)   SpO2 96%   BMI 28 87 kg/m²        Physical Exam     Physical Exam   Constitutional: He is oriented to person, place, and time  He appears well-developed and well-nourished  No distress  Eyes: Pupils are equal, round, and reactive to light   EOM are normal  Right eye exhibits no discharge  No foreign body present in the right eye  Left eye exhibits no discharge  Foreign body present in the left eye  Right conjunctiva is not injected  Left conjunctiva is injected  Punctate foreign body left eye removed with cotton tip applicator without complications  No apparent rust ring  Cardiovascular: Normal rate and regular rhythm  Neurological: He is alert and oriented to person, place, and time  Skin: Skin is warm and dry  No rash noted  Nursing note and vitals reviewed

## 2020-07-04 NOTE — PATIENT INSTRUCTIONS
Corneal Abrasion   WHAT YOU NEED TO KNOW:   A corneal abrasion is a scratch on the cornea of your eye  The cornea is the clear layer that covers the front of your eye  A small scratch may heal in 1 to 2 days  Deeper or larger scratches may take longer to heal         DISCHARGE INSTRUCTIONS:   Contact your healthcare provider if:   · Your eye pain or vision gets worse  · You have yellow or green drainage from your eye  · You have questions or concerns about your condition or care  Medicines:   · Medicines  may be given in the form of eyedrops or ointment to help prevent an eye infection  You may also be given eye drops to decrease pain  Ask how to take this medicine safely  · Take your medicine as directed  Contact your healthcare provider if you think your medicine is not helping or if you have side effects  Tell him or her if you are allergic to any medicine  Keep a list of the medicines, vitamins, and herbs you take  Include the amounts, and when and why you take them  Bring the list or the pill bottles to follow-up visits  Carry your medicine list with you in case of an emergency  Follow up with your healthcare provider as directed:  Write down your questions so you remember to ask them during your visits  Self-care:   · Do not touch or rub your eye  · Ask your healthcare provider when you can start your normal activities  · Ask your healthcare provider when you can wear your contact lenses  · Wear sunglasses in bright light until your eyes feel better  Help prevent corneal abrasions:   · Remove your contact lenses if your eyes feel dry or irritated  · Wash your hands if you need to touch your eyes or your face  · Trim your child's fingernails so he cannot scratch his eye  · Wear protective eyewear when you work with chemicals, wood, dust, or metal      · Wear protective eyewear when you play sports  · Do not wear your contacts for longer than you should       · Do not wear colored lenses or lenses with shapes on them  These lenses may cause eye damage and vision loss  · Do not wear glitter makeup  Glitter can easily get into your eyes and under contact lenses  · Do not sleep with your contacts in your eyes  © 2017 [address] Information is for End User's use only and may not be sold, redistributed or otherwise used for commercial purposes  All illustrations and images included in CareNotes® are the copyrighted property of [employer]  or Hermann Christina  The above information is an  only  It is not intended as medical advice for individual conditions or treatments  Talk to your doctor, nurse or pharmacist before following any medical regimen to see if it is safe and effective for you

## 2020-07-28 ENCOUNTER — OFFICE VISIT (OUTPATIENT)
Dept: FAMILY MEDICINE CLINIC | Facility: CLINIC | Age: 80
End: 2020-07-28
Payer: MEDICARE

## 2020-07-28 VITALS
WEIGHT: 166 LBS | DIASTOLIC BLOOD PRESSURE: 76 MMHG | BODY MASS INDEX: 29.41 KG/M2 | SYSTOLIC BLOOD PRESSURE: 126 MMHG | TEMPERATURE: 97.5 F | HEIGHT: 63 IN

## 2020-07-28 DIAGNOSIS — E11.9 TYPE 2 DIABETES MELLITUS WITHOUT COMPLICATION, WITHOUT LONG-TERM CURRENT USE OF INSULIN (HCC): Primary | Chronic | ICD-10-CM

## 2020-07-28 DIAGNOSIS — E03.9 ACQUIRED HYPOTHYROIDISM: Chronic | ICD-10-CM

## 2020-07-28 DIAGNOSIS — Z00.00 MEDICARE ANNUAL WELLNESS VISIT, SUBSEQUENT: ICD-10-CM

## 2020-07-28 DIAGNOSIS — I10 ESSENTIAL HYPERTENSION: Chronic | ICD-10-CM

## 2020-07-28 DIAGNOSIS — I25.810 CORONARY ARTERY DISEASE INVOLVING CORONARY BYPASS GRAFT OF NATIVE HEART WITHOUT ANGINA PECTORIS: Chronic | ICD-10-CM

## 2020-07-28 DIAGNOSIS — M15.9 PRIMARY OSTEOARTHRITIS INVOLVING MULTIPLE JOINTS: Chronic | ICD-10-CM

## 2020-07-28 LAB — SL AMB POCT HEMOGLOBIN AIC: 6.7 (ref ?–6.5)

## 2020-07-28 PROCEDURE — G0439 PPPS, SUBSEQ VISIT: HCPCS | Performed by: FAMILY MEDICINE

## 2020-07-28 PROCEDURE — 99214 OFFICE O/P EST MOD 30 MIN: CPT | Performed by: FAMILY MEDICINE

## 2020-07-28 PROCEDURE — 83036 HEMOGLOBIN GLYCOSYLATED A1C: CPT | Performed by: FAMILY MEDICINE

## 2020-07-28 NOTE — PROGRESS NOTES
Assessment and Plan:     Problem List Items Addressed This Visit        Endocrine    Type 2 diabetes mellitus without complication, without long-term current use of insulin ([address] ) - Primary (Chronic)           Preventive health issues were discussed with patient, and age appropriate screening tests were ordered as noted in patient's After Visit Summary  Personalized health advice and appropriate referrals for health education or preventive services given if needed, as noted in patient's After Visit Summary       History of Present Illness:     Patient presents for Medicare Annual Wellness visit    Patient Care Team:  Aggie Alicea MD as PCP - General (Family Medicine)     Problem List:     Patient Active Problem List   Diagnosis    Type 2 diabetes mellitus without complication, without long-term current use of insulin ([address] )    Essential hypertension    Coronary artery disease involving coronary bypass graft of native heart without angina pectoris    Acquired hypothyroidism    Primary osteoarthritis involving multiple joints      Past Medical and Surgical History:     Past Medical History:   Diagnosis Date    Chronic ischemic heart disease     Coronary artery disease     hx cabg x4 2007    Hearing loss     Hyperlipidemia     Hypertension     Hypokalemia     Hypothyroid     OA (osteoarthritis)     left knee    Prediabetes     S/P AVR     2007    Type 2 diabetes mellitus ([address] )     Wears dentures     full upper    Wears glasses      Past Surgical History:   Procedure Laterality Date    AORTIC VALVE REPLACEMENT      BACK SURGERY      lumbar     CATARACT EXTRACTION Bilateral     CORONARY ARTERY BYPASS GRAFT      x4    2007    KNEE ARTHROSCOPY Bilateral     AL TOTAL KNEE ARTHROPLASTY Left 1/17/2018    Procedure: ARTHROPLASTY KNEE TOTAL;  Surgeon: Nilesh Morales MD;  [facility];  Service: Orthopedics    SHOULDER ARTHROSCOPY Right       Family History:     Family History   Problem Relation Age of Onset   Rosas Irwin Cancer Mother       Social History:        Social History     Socioeconomic History    Marital status: /Civil Union     Spouse name: Not on file    Number of children: Not on file    Years of education: Not on file    Highest education level: Not on file   Occupational History    Occupation: Repairs fire trucks    Social Needs    Financial resource strain: Not on file    Food insecurity:     Worry: Not on file     Inability: Not on file    Transportation needs:     Medical: Not on file     Non-medical: Not on file   Tobacco Use    Smoking status: Never Smoker    Smokeless tobacco: Never Used   Substance and Sexual Activity    Alcohol use: No    Drug use: No    Sexual activity: Not on file   Lifestyle    Physical activity:     Days per week: Not on file     Minutes per session: Not on file    Stress: Not on file   Relationships    Social connections:     Talks on phone: Not on file     Gets together: Not on file     Attends Tenri[employer] service: Not on file     Active member of club or organization: Not on file     Attends meetings of clubs or organizations: Not on file     Relationship status: Not on file    Intimate partner violence:     Fear of current or ex partner: Not on file     Emotionally abused: Not on file     Physically abused: Not on file     Forced sexual activity: Not on file   Other Topics Concern    Not on file   Social History Narrative    Not on file      Medications and Allergies:     Current Outpatient Medications   Medication Sig Dispense Refill    aspirin 325 mg tablet Take 1 tablet by mouth 2 (two) times a day  0    levothyroxine 25 mcg tablet Take 1 tablet (25 mcg total) by mouth daily 90 tablet 3    lisinopril-hydrochlorothiazide (PRINZIDE,ZESTORETIC) 10-12 5 MG per tablet Take 1 tablet by mouth daily 90 tablet 3    Magnesium 400 MG CAPS Take 1 capsule by mouth daily      Multiple Vitamin (MULTIVITAMIN) capsule Take 1 capsule by mouth daily      Omega-3 Fatty Acids (FISH OIL) 1200 MG CAPS Take by mouth      simvastatin (ZOCOR) 40 mg tablet Take 1 tablet (40 mg total) by mouth daily at bedtime 90 tablet 3     No current facility-administered medications for this visit  Allergies   Allergen Reactions    Percocet [Oxycodone-Acetaminophen] Itching     Throat swelling      Immunizations:     Immunization History   Administered Date(s) Administered    INFLUENZA 12/18/2007, 09/11/2008, 09/08/2009, 09/08/2010, 09/26/2011, 10/10/2012, 11/12/2013, 12/10/2014, 09/22/2015, 10/04/2016, 11/01/2017, 11/06/2018    Influenza, high dose seasonal 0 5 mL 10/28/2019    Pneumococcal Conjugate 13-Valent 05/02/2018    Pneumococcal Polysaccharide PPV23 12/18/2007, 05/02/2018      Health Maintenance: There are no preventive care reminders to display for this patient  Topic Date Due    DTaP,Tdap,and Td Vaccines (1 - Tdap) 07/12/1951      Medicare Health Risk Assessment:     There were no vitals taken for this visit  Ya Beal is here for his Subsequent Wellness visit  Last Medicare Wellness visit information reviewed, patient interviewed and updates made to the record today  Health Risk Assessment:   Patient rates overall health as fair  Patient feels that their physical health rating is same  Eyesight was rated as Same  Hearing was rated as Same  Patient feels that their emotional and mental health rating is Same  Pain experienced in the last 7 days has been Some  Patient states that he has experienced no weight loss or gain in last 6 months  No issues    Depression Screening:   PHQ-2 Score: 0      Fall Risk Screening: In the past year, patient has experienced: no history of falling in past year      Home Safety:  Patient does not have trouble with stairs inside or outside of their home  Patient has working smoke alarms and has no working carbon monoxide detector  Home safety hazards include: none   Has all electric in the house although I did advise when changing smoke alarm to include carbon monoxide monitor  Should put grab bars in the bathroom    Nutrition:   Current diet is Diabetic  Continue to watch sweets and starch in diet    Medications:   Patient is currently taking over-the-counter supplements  OTC medications include: Magnesium, regular aspirin, and fish oil  Patient is able to manage medications  No issues    Activities of Daily Living (ADLs)/Instrumental Activities of Daily Living (IADLs):   Walk and transfer into and out of bed and chair?: Yes  Dress and groom yourself?: Yes    Bathe or shower yourself?: Yes    Feed yourself? Yes  Do your laundry/housekeeping?: Yes  Manage your money, pay your bills and track your expenses?: Yes  Make your own meals?: Yes    Do your own shopping?: Yes    ADL comments: No problems    Overall patient is very functional in the house    Previous Hospitalizations:   Any hospitalizations or ED visits within the last 12 months?: No      Hospitalization Comments: Patient follows up with [facility] for cardiology    Advance Care Planning:   Living will: Yes    Advanced directive: Yes      Comments: No issues    Cognitive Screening:   Provider or family/friend/caregiver concerned regarding cognition?: No    PREVENTIVE SCREENINGS      Cardiovascular Screening:    General: Screening Not Indicated and History Lipid Disorder      Diabetes Screening:     General: Screening Not Indicated and History Diabetes      Colorectal Cancer Screening:     General: Screening Not Indicated      Prostate Cancer Screening:    General: Screening Not Indicated      Osteoporosis Screening:    General: Screening Not Indicated      Abdominal Aortic Aneurysm (AAA) Screening:        General: Screening Not Indicated      Lung Cancer Screening:     General: Screening Not Indicated      Hepatitis C Screening:    General: Screening Not Indicated      Preventive Screening Comments: Needs to follow-up with routine eye care and have reports forwarded to office      Megan Chanel MD

## 2020-07-28 NOTE — ASSESSMENT & PLAN NOTE
Continues to have a lot of symptoms although is very aggressive with his activity    He occasionally takes aspirin for this but usually  does not take anything

## 2020-07-28 NOTE — ASSESSMENT & PLAN NOTE
Overall doing well  Continue current regimen and follow-up  I did discussed need to protect himself from excessive heat and should every 1/2 hour go in and cool down    Push activity as tolerated

## 2020-07-28 NOTE — PATIENT INSTRUCTIONS

## 2020-07-28 NOTE — PROGRESS NOTES
Assessment/Plan:    Type 2 diabetes mellitus without complication, without long-term current use of insulin (HCC)  Continues to show good control with diet  Watch starch and sweets in diet and push daily activity  Needs to get eye exam  Lab Results   Component Value Date    HGBA1C 6 7 (A) 07/28/2020       Essential hypertension  Overall stable continue current regimen    Coronary artery disease involving coronary bypass graft of native heart without angina pectoris  Overall doing well  Continue current regimen and follow-up  I did discussed need to protect himself from excessive heat and should every 1/2 hour go in and cool down  Push activity as tolerated    Acquired hypothyroidism  Overall stable continue current regimen    Primary osteoarthritis involving multiple joints  Continues to have a lot of symptoms although is very aggressive with his activity  He occasionally takes aspirin for this but usually  does not take anything       Diagnoses and all orders for this visit:    Type 2 diabetes mellitus without complication, without long-term current use of insulin (HCC)  -     POCT hemoglobin A1c    Medicare annual wellness visit, subsequent    Essential hypertension    Coronary artery disease involving coronary bypass graft of native heart without angina pectoris    Acquired hypothyroidism    Primary osteoarthritis involving multiple joints          Subjective:      Patient ID: Jakie Collet is a 68 y o  male  Patient has history of hypertension, hypothyroidism, coronary artery disease, diabetes mellitus adult and degenerative arthritis  Continues to have his intermittent problems with low back pain and left knee pain although 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  Was out in the heat extensively yesterday and was very lightheaded working in the heat although feels okay today    Overall has been feeling well      The following portions of the patient's history were reviewed and updated as appropriate: allergies, current medications, past medical history, past social history, past surgical history and problem list   Diabetic Foot Exam    Patient's shoes and socks removed  Right Foot/Ankle   Right Foot Inspection    Toe Exam: right toe deformity (Hammertoes and bunion)  Sensory       Monofilament testing: intact  Vascular  Capillary refills: < 3 seconds  The right DP pulse is 2+  The right PT pulse is 2+  Left Foot/Ankle  Left Foot Inspection                           Toe Exam: left toe deformity ( hammertoes and bunion)                   Sensory       Monofilament: intact  Vascular  Capillary refills: < 3 seconds  The left DP pulse is 2+  The left PT pulse is 2+  Assign Risk Category:  Deformity present; No loss of protective sensation; No weak pulses       Risk: 1      Review of Systems   Constitutional: Negative for activity change, appetite change, chills, fatigue, fever and unexpected weight change  HENT: Positive for hearing loss  Negative for congestion, dental problem, rhinorrhea, trouble swallowing and voice change  Eyes: Negative for visual disturbance  Respiratory: Negative for apnea, cough, chest tightness and shortness of breath  Cardiovascular: Negative for chest pain, palpitations and leg swelling  Gastrointestinal: Negative for abdominal distention, abdominal pain, constipation and diarrhea  Endocrine: Negative for polyuria ( nocturia times 0-1)  Genitourinary: Negative for difficulty urinating and enuresis  Musculoskeletal: Positive for arthralgias (Back and left knee pain) and back pain  Negative for myalgias  Skin: Negative for rash  Allergic/Immunologic: Negative for environmental allergies  Neurological: Negative for dizziness, weakness, light-headedness, numbness and headaches  Hematological: Negative for adenopathy  Psychiatric/Behavioral: Negative for agitation and sleep disturbance           Objective:      /76 (BP Location: Left arm, Patient Position: Sitting, Cuff Size: Standard)   Temp 97 5 °F (36 4 °C)   Ht 5' 3" (1 6 m)   Wt 75 3 kg (166 lb)   BMI 29 41 kg/m²          Physical Exam   Constitutional: He is oriented to person, place, and time  He appears well-developed and well-nourished  No distress  HENT:   Head: Normocephalic  Right Ear: Tympanic membrane, external ear and ear canal normal  Decreased hearing ( 25 decibel hearing screen off except at 500 hertz  No difficulty with conversation) is noted  Left Ear: Tympanic membrane, external ear and ear canal normal  Decreased hearing ( 25 decibel hearing screen off except at 500 hertz) is noted  Nose: Nose normal    Mouth/Throat: Oropharynx is clear and moist  He has dentures ( upper)  Abnormal dentition ( multiple missing lower teeth)  Eyes: Pupils are equal, round, and reactive to light  Conjunctivae and EOM are normal    Neck: Normal range of motion  Neck supple  No thyromegaly present  Cardiovascular: Normal rate, regular rhythm and intact distal pulses  Pulses are no weak pulses  Murmur ( mid systolic murmur at left sternal border heart rate is 66  No bruits are noted) heard  Pulses:       Dorsalis pedis pulses are 2+ on the right side, and 2+ on the left side  Posterior tibial pulses are 2+ on the right side, and 2+ on the left side  Pulmonary/Chest: Effort normal and breath sounds normal    Abdominal: He exhibits no distension and no mass  There is no tenderness  Musculoskeletal: Normal range of motion  He exhibits no edema  Right foot: There is deformity ( hammertoes and bunions)  Left foot: There is deformity ( hammertoes and bunion)  Feet:    Feet:   Right Foot:   Protective Sensation: 8 sites tested  8 sites sensed  Left Foot:   Protective Sensation: 8 sites tested  8 sites sensed  Lymphadenopathy:     He has no cervical adenopathy  Neurological: He is alert and oriented to person, place, and time   He displays abnormal reflex (Reflexes 1+)  No cranial nerve deficit or sensory deficit  He exhibits normal muscle tone  Coordination normal    Skin: Skin is warm and dry  Capillary refill takes 2 to 3 seconds  No rash noted  Psychiatric: He has a normal mood and affect  His behavior is normal  Judgment and thought content normal    Nursing note and vitals reviewed

## 2020-07-28 NOTE — ASSESSMENT & PLAN NOTE
Continues to show good control with diet  Watch starch and sweets in diet and push daily activity    Needs to get eye exam  Lab Results   Component Value Date    HGBA1C 6 7 (A) 07/28/2020

## 2020-08-24 ENCOUNTER — OFFICE VISIT (OUTPATIENT)
Dept: URGENT CARE | Facility: CLINIC | Age: 80
End: 2020-08-24
Payer: MEDICARE

## 2020-08-24 VITALS
RESPIRATION RATE: 18 BRPM | HEART RATE: 49 BPM | TEMPERATURE: 94.9 F | SYSTOLIC BLOOD PRESSURE: 136 MMHG | BODY MASS INDEX: 29.77 KG/M2 | DIASTOLIC BLOOD PRESSURE: 94 MMHG | WEIGHT: 168 LBS | OXYGEN SATURATION: 98 % | HEIGHT: 63 IN

## 2020-08-24 DIAGNOSIS — H10.31 ACUTE CONJUNCTIVITIS OF RIGHT EYE, UNSPECIFIED ACUTE CONJUNCTIVITIS TYPE: Primary | ICD-10-CM

## 2020-08-24 DIAGNOSIS — T15.92XA FOREIGN BODY, EYE, LEFT, INITIAL ENCOUNTER: ICD-10-CM

## 2020-08-24 PROCEDURE — 99213 OFFICE O/P EST LOW 20 MIN: CPT | Performed by: EMERGENCY MEDICINE

## 2020-08-24 PROCEDURE — G0463 HOSPITAL OUTPT CLINIC VISIT: HCPCS | Performed by: EMERGENCY MEDICINE

## 2020-08-24 RX ORDER — POLYMYXIN B SULFATE AND TRIMETHOPRIM 1; 10000 MG/ML; [USP'U]/ML
1 SOLUTION OPHTHALMIC EVERY 6 HOURS
Qty: 10 ML | Refills: 0 | Status: SHIPPED | OUTPATIENT
Start: 2020-08-24 | End: 2020-08-31

## 2020-08-24 NOTE — PROGRESS NOTES
[facility]        NAME: Monse Esteves is a 68 y o  male  : 1940    MRN: [id number]  DATE: 2020  TIME: 7:26 PM    Assessment and Plan   Acute conjunctivitis of right eye, unspecified acute conjunctivitis type [H10 31]  1  Acute conjunctivitis of right eye, unspecified acute conjunctivitis type  polymyxin b-trimethoprim (POLYTRIM) ophthalmic solution    Ambulatory referral to Ophthalmology   2  Foreign body, eye, left, initial encounter  Ambulatory referral to Ophthalmology     After informed verbal consent, tetracaine 2 drops right eye then fluorescein 1 drop    Patient Instructions     Patient Instructions     Corneal Abrasion   WHAT YOU NEED TO KNOW:   A corneal abrasion is a scratch on the cornea of your eye  The cornea is the clear layer that covers the front of your eye  A small scratch may heal in 1 to 2 days  Deeper or larger scratches may take longer to heal         DISCHARGE INSTRUCTIONS:   Contact your healthcare provider if:   · Your eye pain or vision gets worse  · You have yellow or green drainage from your eye  · You have questions or concerns about your condition or care  Medicines:   · Medicines  may be given in the form of eyedrops or ointment to help prevent an eye infection  You may also be given eye drops to decrease pain  Ask how to take this medicine safely  · Take your medicine as directed  Contact your healthcare provider if you think your medicine is not helping or if you have side effects  Tell him or her if you are allergic to any medicine  Keep a list of the medicines, vitamins, and herbs you take  Include the amounts, and when and why you take them  Bring the list or the pill bottles to follow-up visits  Carry your medicine list with you in case of an emergency  Follow up with your healthcare provider as directed:  Write down your questions so you remember to ask them during your visits  Self-care:   · Do not touch or rub your eye       · Ask your healthcare provider when you can start your normal activities  · Ask your healthcare provider when you can wear your contact lenses  · Wear sunglasses in bright light until your eyes feel better  Help prevent corneal abrasions:   · Remove your contact lenses if your eyes feel dry or irritated  · Wash your hands if you need to touch your eyes or your face  · Trim your child's fingernails so he cannot scratch his eye  · Wear protective eyewear when you work with chemicals, wood, dust, or metal      · Wear protective eyewear when you play sports  · Do not wear your contacts for longer than you should  · Do not wear colored lenses or lenses with shapes on them  These lenses may cause eye damage and vision loss  · Do not wear glitter makeup  Glitter can easily get into your eyes and under contact lenses  · Do not sleep with your contacts in your eyes  © 2017 [address] Information is for End User's use only and may not be sold, redistributed or otherwise used for commercial purposes  All illustrations and images included in CareNotes® are the copyrighted property of [employer] , [employer]  or Hermann Christina  The above information is an  only  It is not intended as medical advice for individual conditions or treatments  Talk to your doctor, nurse or pharmacist before following any medical regimen to see if it is safe and effective for you  Follow up with PCP in 3-5 days  Proceed to  ER if symptoms worsen  Chief Complaint     Chief Complaint   Patient presents with    Eye Pain     Started today after working in garage and feels something scrathing his right eye          History of Present Illness       Patient complains of foreign body sensation left eye sudden onset after working in his [employer] garage earlier this evening  He denies visual symptoms    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 but denies any eye symptoms at that time       Review of Systems   Review of Systems   Constitutional: Negative for chills and fever  Eyes: Positive for pain and redness  Negative for visual disturbance  Skin: Negative for rash  Neurological: Negative for headaches           Current Medications       Current Outpatient Medications:     aspirin 325 mg tablet, Take 1 tablet by mouth 2 (two) times a day, Disp: , Rfl: 0    levothyroxine 25 mcg tablet, Take 1 tablet (25 mcg total) by mouth daily, Disp: 90 tablet, Rfl: 3    lisinopril-hydrochlorothiazide (PRINZIDE,ZESTORETIC) 10-12 5 MG per tablet, Take 1 tablet by mouth daily, Disp: 90 tablet, Rfl: 3    Magnesium 400 MG CAPS, Take 1 capsule by mouth daily, Disp: , Rfl:     Multiple Vitamin (MULTIVITAMIN) capsule, Take 1 capsule by mouth daily, Disp: , Rfl:     Omega-3 Fatty Acids (FISH OIL) 1200 MG CAPS, Take by mouth, Disp: , Rfl:     polymyxin b-trimethoprim (POLYTRIM) ophthalmic solution, Administer 1 drop to the right eye every 6 (six) hours for 7 days, Disp: 10 mL, Rfl: 0    simvastatin (ZOCOR) 40 mg tablet, Take 1 tablet (40 mg total) by mouth daily at bedtime, Disp: 90 tablet, Rfl: 3    Current Allergies     Allergies as of 08/24/2020 - Reviewed 08/24/2020   Allergen Reaction Noted    Percocet [oxycodone-acetaminophen] Itching 01/17/2018            The following portions of the patient's history were reviewed and updated as appropriate: allergies, current medications, past family history, past medical history, past social history, past surgical history and problem list      Past Medical History:   Diagnosis Date    Chronic ischemic heart disease     Coronary artery disease     hx cabg x4 2007    Hearing loss     Hyperlipidemia     Hypertension     Hypokalemia     Hypothyroid     OA (osteoarthritis)     left knee    Prediabetes     S/P AVR     2007    Type 2 diabetes mellitus ([address] )     Wears dentures     full upper    Wears glasses        Past Surgical History:   Procedure Laterality Date    AORTIC VALVE REPLACEMENT      BACK SURGERY      lumbar     CATARACT EXTRACTION Bilateral     CORONARY ARTERY BYPASS GRAFT      x4    2007    KNEE ARTHROSCOPY Bilateral     [place] TOTAL KNEE ARTHROPLASTY Left 1/17/2018    Procedure: ARTHROPLASTY KNEE TOTAL;  Surgeon: Charanjit Saenz MD;  Location: [facility];  Service: Orthopedics    SHOULDER ARTHROSCOPY Right        Family History   Problem Relation Age of Onset    Cancer Mother          Medications have been verified  Objective   /94   Pulse (!) 49   Temp (!) 94 9 °F (34 9 °C)   Resp 18   Ht 5' 3" (1 6 m)   Wt 76 2 kg (168 lb)   SpO2 98%   BMI 29 76 kg/m²        Physical Exam     Physical Exam  Vitals signs and nursing note reviewed  Constitutional:       General: He is not in acute distress  Appearance: He is well-developed  Eyes:      Extraocular Movements: Extraocular movements intact  Pupils: Pupils are equal, round, and reactive to light  Comments: Conjunctiva injected on the right  There appears to be a black punctate foreign body on patient's right iris  There is no corresponding fluorescein uptake on the patient's right cornea, therefore I suspect this intra-ocular foreign body is old  Skin:     General: Skin is warm and dry  Findings: No rash  Neurological:      Mental Status: He is alert and oriented to person, place, and time

## 2020-08-24 NOTE — PATIENT INSTRUCTIONS
Conjunctivitis   AMBULATORY CARE:   Conjunctivitis,  or pink eye, is inflammation of your conjunctiva  The conjunctiva is a thin tissue that covers the front of your eye and the back of your eyelids  The conjunctiva helps protect your eye and keep it moist  Conjunctivitis may be caused by bacteria, allergies, or a virus  If your conjunctivitis is caused by bacteria, it may get better on its own in about 7 days  Viral conjunctivitis can last up to 3 weeks  Common symptoms may include any of the following: You will usually have symptoms in both eyes if your conjunctivitis is caused by allergies  You may also have other allergic symptoms, such as a rash or runny nose  Symptoms will usually start in 1 eye if your conjunctivitis is caused by a virus or bacteria  · Redness in the whites of your eye    · Itching in your eye or around your eye    · Feeling like there is something in your eye    · Watery or thick, sticky discharge    · Crusty eyelids when you wake up in the morning    · Burning, stinging, or swelling in your eye    · Pain when you see bright light  Seek care immediately if:   · You have worsening eye pain  · The swelling in your eye gets worse, even after treatment  · Your vision suddenly becomes worse or you cannot see at all  Contact your healthcare provider if:   · You develop a fever and ear pain  · You have tiny bumps or spots of blood on your eye  · You have questions or concerns about your condition or care  Treatment  will depend on the cause of your conjunctivitis  You may need antibiotics or allergy medicine as a pill, eye drop, or eye ointment  Manage your symptoms:   · Apply a cool compress  Wet a washcloth with cold water and place it on your eye  This will help decrease itching and irritation  · Do not wear contact lenses  They can irritate your eye  Throw away the pair you are using and ask when you can wear them again   Use a new pair of lenses when your healthcare provider says it is okay  · Avoid irritants  Stay away from smoke filled areas  Shield your eyes from wind and sun  · Flush your eye  You may need to flush your eye with saline to help decrease your symptoms  Ask for more information on how to flush your eye  Medicines:  Treatment depends on what is causing your conjunctivitis  You may be given any of the following:  · Allergy medicine  helps decrease itchy, red, swollen eyes caused by allergies  It may be given as a pill, eye drops, or nasal spray  · Antibiotics  may be needed if your conjunctivitis is caused by bacteria  This medicine may be given as a pill, eye drops, or eye ointment  · Take your medicine as directed  Contact your healthcare provider if you think your medicine is not helping or if you have side effects  Tell him or her if you are allergic to any medicine  Keep a list of the medicines, vitamins, and herbs you take  Include the amounts, and when and why you take them  Bring the list or the pill bottles to follow-up visits  Carry your medicine list with you in case of an emergency  Prevent the spread of conjunctivitis:   · Wash your hands with soap and water often  Wash your hands before and after you touch your eyes  Also wash your hands before you prepare or eat food and after you use the bathroom or change a diaper  · Avoid allergens  Try to avoid the things that cause your allergies, such as pets, dust, or grass  · Avoid contact with others  Do not share towels or washcloths  Try to stay away from others as much as possible  Ask when you can return to work or school  · Throw away eye makeup  The bacteria that caused your conjunctivitis can stay in eye makeup  Throw away mascara and other eye makeup  © 2017 [employer]  Information is for End User's use only and may not be sold, redistributed or otherwise used for commercial purposes   All illustrations and images included in [facility] are the copyrighted property of [employer]  or [employer]  The above information is an  only  It is not intended as medical advice for individual conditions or treatments  Talk to your doctor, nurse or pharmacist before following any medical regimen to see if it is safe and effective for you

## 2020-10-15 DIAGNOSIS — E03.9 ACQUIRED HYPOTHYROIDISM: ICD-10-CM

## 2020-10-15 DIAGNOSIS — E78.00 HYPERCHOLESTEROLEMIA: ICD-10-CM

## 2020-10-15 RX ORDER — LEVOTHYROXINE SODIUM 0.03 MG/1
TABLET ORAL
Qty: 90 TABLET | Refills: 3 | Status: SHIPPED | OUTPATIENT
Start: 2020-10-15 | End: 2021-10-26

## 2020-10-15 RX ORDER — SIMVASTATIN 40 MG
TABLET ORAL
Qty: 90 TABLET | Refills: 3 | Status: SHIPPED | OUTPATIENT
Start: 2020-10-15 | End: 2021-10-26

## 2020-10-27 ENCOUNTER — IMMUNIZATIONS (OUTPATIENT)
Dept: FAMILY MEDICINE CLINIC | Facility: CLINIC | Age: 80
End: 2020-10-27
Payer: MEDICARE

## 2020-10-27 DIAGNOSIS — Z23 ENCOUNTER FOR IMMUNIZATION: ICD-10-CM

## 2020-10-27 PROCEDURE — G0008 ADMIN INFLUENZA VIRUS VAC: HCPCS

## 2020-10-27 PROCEDURE — 90662 IIV NO PRSV INCREASED AG IM: CPT

## 2020-12-01 ENCOUNTER — OFFICE VISIT (OUTPATIENT)
Dept: FAMILY MEDICINE CLINIC | Facility: CLINIC | Age: 80
End: 2020-12-01
Payer: MEDICARE

## 2020-12-01 VITALS
DIASTOLIC BLOOD PRESSURE: 70 MMHG | OXYGEN SATURATION: 97 % | TEMPERATURE: 97.1 F | WEIGHT: 162 LBS | HEIGHT: 63 IN | HEART RATE: 77 BPM | BODY MASS INDEX: 28.7 KG/M2 | SYSTOLIC BLOOD PRESSURE: 120 MMHG

## 2020-12-01 DIAGNOSIS — E03.9 ACQUIRED HYPOTHYROIDISM: Chronic | ICD-10-CM

## 2020-12-01 DIAGNOSIS — I10 ESSENTIAL HYPERTENSION: Chronic | ICD-10-CM

## 2020-12-01 DIAGNOSIS — I25.810 CORONARY ARTERY DISEASE INVOLVING CORONARY BYPASS GRAFT OF NATIVE HEART WITHOUT ANGINA PECTORIS: Chronic | ICD-10-CM

## 2020-12-01 DIAGNOSIS — M15.9 PRIMARY OSTEOARTHRITIS INVOLVING MULTIPLE JOINTS: Chronic | ICD-10-CM

## 2020-12-01 DIAGNOSIS — E11.9 TYPE 2 DIABETES MELLITUS WITHOUT COMPLICATION, WITHOUT LONG-TERM CURRENT USE OF INSULIN (HCC): Primary | Chronic | ICD-10-CM

## 2020-12-01 LAB — SL AMB POCT HEMOGLOBIN AIC: 6.4 (ref ?–6.5)

## 2020-12-01 PROCEDURE — 83036 HEMOGLOBIN GLYCOSYLATED A1C: CPT | Performed by: FAMILY MEDICINE

## 2020-12-01 PROCEDURE — 99214 OFFICE O/P EST MOD 30 MIN: CPT | Performed by: FAMILY MEDICINE

## 2020-12-02 ENCOUNTER — TELEPHONE (OUTPATIENT)
Dept: ADMINISTRATIVE | Facility: OTHER | Age: 80
End: 2020-12-02

## 2020-12-15 ENCOUNTER — OFFICE VISIT (OUTPATIENT)
Dept: FAMILY MEDICINE CLINIC | Facility: CLINIC | Age: 80
End: 2020-12-15
Payer: MEDICARE

## 2020-12-15 VITALS
OXYGEN SATURATION: 97 % | WEIGHT: 163 LBS | SYSTOLIC BLOOD PRESSURE: 122 MMHG | HEART RATE: 54 BPM | TEMPERATURE: 97.6 F | BODY MASS INDEX: 28.88 KG/M2 | DIASTOLIC BLOOD PRESSURE: 70 MMHG | HEIGHT: 63 IN

## 2020-12-15 DIAGNOSIS — R11.0 NAUSEA: Primary | ICD-10-CM

## 2020-12-15 PROCEDURE — 99213 OFFICE O/P EST LOW 20 MIN: CPT | Performed by: FAMILY MEDICINE

## 2020-12-15 RX ORDER — OMEPRAZOLE 40 MG/1
40 CAPSULE, DELAYED RELEASE ORAL DAILY
COMMUNITY
End: 2021-04-06 | Stop reason: ALTCHOICE

## 2020-12-15 RX ORDER — CLOPIDOGREL BISULFATE 75 MG/1
75 TABLET ORAL DAILY
COMMUNITY

## 2020-12-30 ENCOUNTER — OFFICE VISIT (OUTPATIENT)
Dept: FAMILY MEDICINE CLINIC | Facility: CLINIC | Age: 80
End: 2020-12-30
Payer: MEDICARE

## 2020-12-30 VITALS
TEMPERATURE: 97.5 F | HEART RATE: 48 BPM | HEIGHT: 63 IN | OXYGEN SATURATION: 98 % | DIASTOLIC BLOOD PRESSURE: 68 MMHG | SYSTOLIC BLOOD PRESSURE: 122 MMHG | BODY MASS INDEX: 28.35 KG/M2 | WEIGHT: 160 LBS

## 2020-12-30 DIAGNOSIS — R11.0 NAUSEA: Primary | ICD-10-CM

## 2020-12-30 DIAGNOSIS — I25.810 CORONARY ARTERY DISEASE INVOLVING CORONARY BYPASS GRAFT OF NATIVE HEART WITHOUT ANGINA PECTORIS: Chronic | ICD-10-CM

## 2020-12-30 PROCEDURE — 99213 OFFICE O/P EST LOW 20 MIN: CPT | Performed by: FAMILY MEDICINE

## 2021-01-13 ENCOUNTER — OFFICE VISIT (OUTPATIENT)
Dept: FAMILY MEDICINE CLINIC | Facility: CLINIC | Age: 81
End: 2021-01-13
Payer: MEDICARE

## 2021-01-13 VITALS
DIASTOLIC BLOOD PRESSURE: 62 MMHG | SYSTOLIC BLOOD PRESSURE: 102 MMHG | BODY MASS INDEX: 28.7 KG/M2 | HEIGHT: 63 IN | TEMPERATURE: 97.5 F | WEIGHT: 162 LBS

## 2021-01-13 DIAGNOSIS — I10 ESSENTIAL HYPERTENSION: Chronic | ICD-10-CM

## 2021-01-13 DIAGNOSIS — R11.0 NAUSEA: Primary | ICD-10-CM

## 2021-01-13 DIAGNOSIS — E11.9 TYPE 2 DIABETES MELLITUS WITHOUT COMPLICATION, WITHOUT LONG-TERM CURRENT USE OF INSULIN (HCC): ICD-10-CM

## 2021-01-13 PROCEDURE — 99213 OFFICE O/P EST LOW 20 MIN: CPT | Performed by: FAMILY MEDICINE

## 2021-01-13 RX ORDER — LISINOPRIL AND HYDROCHLOROTHIAZIDE 12.5; 1 MG/1; MG/1
1 TABLET ORAL DAILY
Qty: 90 TABLET | Refills: 3 | Status: SHIPPED | OUTPATIENT
Start: 2021-01-13 | End: 2021-07-10 | Stop reason: HOSPADM

## 2021-01-13 NOTE — PROGRESS NOTES
Assessment/Plan:    Nausea  Much better at this time  Not sure if this related to the high dose of Prilosec or to the aspirin but seems to be tolerating current regimen well  I did advised to restart the Plavix which had been started by Neurology for suspicion of TIA and if the nausea returns should stop this  Continue other meds as is       Diagnoses and all orders for this visit:    Nausea    Essential hypertension  -     lisinopril-hydrochlorothiazide (PRINZIDE,ZESTORETIC) 10-12 5 MG per tablet; Take 1 tablet by mouth daily    Type 2 diabetes mellitus without complication, without long-term current use of insulin (Formerly [facility])          Subjective:      Patient ID: Katelynn Matthews is a 68 y o  male  Patient has history of hypertension, hypothyroidism, coronary artery disease, diabetes mellitus adult and degenerative arthritis  Had been placed on Plavix by Neurology for suspicion of mini-stroke and patient has question about using this  Also was having trouble with nausea and upset stomach and was placed on Prilosec 40 mg twice a day  The Prilosec was reduced to 40 mg to be taken before evening meal and the aspirin was changed to baby aspirin  Also patient stop taking the Plavix  The nausea has basically resolved and appetite has returned and feels good      The following portions of the patient's history were reviewed and updated as appropriate: allergies, past medical history, past social history, past surgical history and problem list BMI Counseling: Body mass index is 28 7 kg/m²  The BMI is above normal  Nutrition recommendations include moderation in carbohydrate intake  Exercise recommendations include moderate physical activity 150 minutes/week  No pharmacotherapy was ordered  Review of Systems   Constitutional: Positive for activity change (Improved) and appetite change ( much better)  Negative for fatigue  Eyes: Negative for visual disturbance     Respiratory: Negative for chest tightness and shortness of breath  Cardiovascular: Negative for chest pain, palpitations and leg swelling  Gastrointestinal: Negative for abdominal pain, constipation, diarrhea and nausea  Endocrine: Negative for polyuria  Neurological: Positive for dizziness  Negative for light-headedness  Psychiatric/Behavioral: Negative for sleep disturbance  Objective:      /62 (BP Location: Left arm, Patient Position: Sitting, Cuff Size: Standard)   Temp 97 5 °F (36 4 °C)   Ht 5' 3" (1 6 m)   Wt 73 5 kg (162 lb)   BMI 28 70 kg/m²          Physical Exam  Vitals signs reviewed  Constitutional:       Appearance: Normal appearance  He is well-developed  HENT:      Head: Normocephalic  Eyes:      Conjunctiva/sclera: Conjunctivae normal    Neck:      Musculoskeletal: Normal range of motion and neck supple  Thyroid: No thyromegaly  Cardiovascular:      Rate and Rhythm: Normal rate and regular rhythm  Heart sounds: Murmur (Mid systolic murmur at left sternal border  Heart rate is 72) present  Pulmonary:      Effort: Pulmonary effort is normal       Breath sounds: Normal breath sounds  Abdominal:      General: Abdomen is flat  There is no distension  Palpations: Abdomen is soft  There is no mass  Tenderness: There is no abdominal tenderness  Musculoskeletal: Normal range of motion  Lymphadenopathy:      Cervical: No cervical adenopathy  Skin:     General: Skin is warm and dry  Findings: No rash  Neurological:      Mental Status: He is alert and oriented to person, place, and time        Coordination: Coordination normal       Gait: Gait normal    Psychiatric:         Mood and Affect: Mood normal

## 2021-01-13 NOTE — PATIENT INSTRUCTIONS
Overall seems to be doing much better in relationship to the nausea  Should continue the Prilosec just once a day and take the baby aspirin  I feel should restart the plavix and if the nausea comes back should stop this    Continue other meds as is

## 2021-01-28 ENCOUNTER — IMMUNIZATIONS (OUTPATIENT)
Dept: FAMILY MEDICINE CLINIC | Facility: HOSPITAL | Age: 81
End: 2021-01-28

## 2021-01-28 DIAGNOSIS — Z23 ENCOUNTER FOR IMMUNIZATION: Primary | ICD-10-CM

## 2021-01-28 PROCEDURE — 0011A SARS-COV-2 / COVID-19 MRNA VACCINE (MODERNA) 100 MCG: CPT

## 2021-01-28 PROCEDURE — 91301 SARS-COV-2 / COVID-19 MRNA VACCINE (MODERNA) 100 MCG: CPT

## 2021-02-25 ENCOUNTER — IMMUNIZATIONS (OUTPATIENT)
Dept: FAMILY MEDICINE CLINIC | Facility: HOSPITAL | Age: 81
End: 2021-02-25

## 2021-02-25 DIAGNOSIS — Z23 ENCOUNTER FOR IMMUNIZATION: Primary | ICD-10-CM

## 2021-02-25 PROCEDURE — 0012A SARS-COV-2 / COVID-19 MRNA VACCINE (MODERNA) 100 MCG: CPT

## 2021-02-25 PROCEDURE — 91301 SARS-COV-2 / COVID-19 MRNA VACCINE (MODERNA) 100 MCG: CPT

## 2021-04-06 ENCOUNTER — OFFICE VISIT (OUTPATIENT)
Dept: FAMILY MEDICINE CLINIC | Facility: CLINIC | Age: 81
End: 2021-04-06
Payer: MEDICARE

## 2021-04-06 VITALS
WEIGHT: 162 LBS | SYSTOLIC BLOOD PRESSURE: 126 MMHG | HEIGHT: 63 IN | OXYGEN SATURATION: 96 % | HEART RATE: 50 BPM | DIASTOLIC BLOOD PRESSURE: 76 MMHG | BODY MASS INDEX: 28.7 KG/M2

## 2021-04-06 DIAGNOSIS — I10 ESSENTIAL HYPERTENSION: Chronic | ICD-10-CM

## 2021-04-06 DIAGNOSIS — R11.0 NAUSEA: ICD-10-CM

## 2021-04-06 DIAGNOSIS — M15.9 PRIMARY OSTEOARTHRITIS INVOLVING MULTIPLE JOINTS: Chronic | ICD-10-CM

## 2021-04-06 DIAGNOSIS — E11.9 TYPE 2 DIABETES MELLITUS WITHOUT COMPLICATION, WITHOUT LONG-TERM CURRENT USE OF INSULIN (HCC): ICD-10-CM

## 2021-04-06 DIAGNOSIS — E03.9 ACQUIRED HYPOTHYROIDISM: Primary | Chronic | ICD-10-CM

## 2021-04-06 DIAGNOSIS — I25.810 CORONARY ARTERY DISEASE INVOLVING CORONARY BYPASS GRAFT OF NATIVE HEART WITHOUT ANGINA PECTORIS: Chronic | ICD-10-CM

## 2021-04-06 LAB — SL AMB POCT HEMOGLOBIN AIC: 6.1 (ref ?–6.5)

## 2021-04-06 PROCEDURE — 83036 HEMOGLOBIN GLYCOSYLATED A1C: CPT | Performed by: FAMILY MEDICINE

## 2021-04-06 PROCEDURE — 99214 OFFICE O/P EST MOD 30 MIN: CPT | Performed by: FAMILY MEDICINE

## 2021-04-06 RX ORDER — ASPIRIN 81 MG/1
324 TABLET ORAL DAILY
COMMUNITY

## 2021-04-06 NOTE — ASSESSMENT & PLAN NOTE
Continues to show excellent control with diet    Watch starch in diet and push daily walking activity  Lab Results   Component Value Date    HGBA1C 6 1 04/06/2021

## 2021-04-06 NOTE — ASSESSMENT & PLAN NOTE
Continues to struggle with the back pain with walking which does suggest spinal stenosis    Push activity as tolerated

## 2021-04-06 NOTE — PROGRESS NOTES
Assessment/Plan:    Type 2 diabetes mellitus without complication, without long-term current use of insulin (HCC)   Continues to show excellent control with diet  Watch starch in diet and push daily walking activity  Lab Results   Component Value Date    HGBA1C 6 1 04/06/2021       Coronary artery disease involving coronary bypass graft of native heart without angina pectoris   Overall stable, continue current regimen and follow-up  Push activity as tolerated    Essential hypertension   Overall stable, continue current regimen    Acquired hypothyroidism   Will need to recheck thyroid levels and adjust meds accordingly    Nausea   Better at this time period is only taking a baby aspirin now and is no longer taking the Prilosec    Primary osteoarthritis involving multiple joints   Continues to struggle with the back pain with walking which does suggest spinal stenosis  Push activity as tolerated       Diagnoses and all orders for this visit:    Acquired hypothyroidism  -     TSH + Free T4; Future    Type 2 diabetes mellitus without complication, without long-term current use of insulin (HCC)  -     POCT hemoglobin A1c    Coronary artery disease involving coronary bypass graft of native heart without angina pectoris    Essential hypertension    Nausea    Primary osteoarthritis involving multiple joints    Other orders  -     Cancel: TDAP VACCINE GREATER THAN OR EQUAL TO 6YO IM  -     Cancel: Ambulatory Referral to Ophthalmology; Future  -     aspirin (ECOTRIN LOW STRENGTH) 81 mg EC tablet; Take 81 mg by mouth daily          Subjective:      Patient ID: Hilary Marte is a 68 y o  male  Patient has history of hypertension, hypothyroidism, coronary artery disease, diabetes mellitus adult and degenerative arthritis    Continues to have his intermittent problems with low back pain and left knee pain      The following portions of the patient's history were reviewed and updated as appropriate: allergies, current medications, past medical history, past social history and problem list       Review of Systems   Constitutional: Negative for activity change, appetite change, chills, fatigue, fever and unexpected weight change  HENT: Negative for congestion, rhinorrhea and trouble swallowing  Eyes: Negative for visual disturbance  Respiratory: Negative for apnea, cough, chest tightness and shortness of breath  Cardiovascular: Negative for chest pain, palpitations and leg swelling  Gastrointestinal: Negative for abdominal distention, abdominal pain, constipation and diarrhea  Endocrine: Negative for polyuria ( nocturia x1)  Genitourinary: Negative for difficulty urinating  Musculoskeletal: Positive for arthralgias and back pain  Negative for myalgias  Skin: Negative for rash  Allergic/Immunologic: Negative for environmental allergies  Neurological: Positive for dizziness ( when bending over and standing back up occasionally)  Negative for weakness, light-headedness, numbness and headaches  Hematological: Negative for adenopathy  Does not bruise/bleed easily  Psychiatric/Behavioral: Negative for agitation and sleep disturbance  Objective:      /76 (BP Location: Right arm, Patient Position: Sitting, Cuff Size: Standard)   Pulse (!) 50   Ht 5' 3" (1 6 m)   Wt 73 5 kg (162 lb)   SpO2 96%   BMI 28 70 kg/m²          Physical Exam  Vitals signs and nursing note reviewed  Constitutional:       Appearance: Normal appearance  He is well-developed  HENT:      Head: Normocephalic  Eyes:      Conjunctiva/sclera: Conjunctivae normal    Neck:      Musculoskeletal: Normal range of motion and neck supple  Thyroid: No thyromegaly  Vascular: No carotid bruit  Cardiovascular:      Rate and Rhythm: Normal rate and regular rhythm  Heart sounds: Murmur ( grade 2/6 systolic murmur at left sternal border  Heart rate is 72) present     Pulmonary:      Effort: Pulmonary effort is normal  Breath sounds: Normal breath sounds  Abdominal:      General: Abdomen is flat  There is no distension  Palpations: Abdomen is soft  There is no mass  Tenderness: There is no abdominal tenderness  Musculoskeletal: Normal range of motion  Right lower leg: No edema  Left lower leg: No edema  Lymphadenopathy:      Cervical: No cervical adenopathy  Skin:     General: Skin is warm and dry  Findings: No rash  Neurological:      Mental Status: He is alert and oriented to person, place, and time  Motor: No weakness  Coordination: Coordination normal       Gait: Gait abnormal       Deep Tendon Reflexes: Reflexes abnormal ( reflexes 1+)  Psychiatric:         Mood and Affect: Mood normal          Behavior: Behavior normal          Thought Content:  Thought content normal          Judgment: Judgment normal

## 2021-04-09 ENCOUNTER — LAB (OUTPATIENT)
Dept: LAB | Facility: HOSPITAL | Age: 81
End: 2021-04-09
Attending: FAMILY MEDICINE
Payer: MEDICARE

## 2021-04-09 ENCOUNTER — TRANSCRIBE ORDERS (OUTPATIENT)
Dept: ADMINISTRATIVE | Facility: HOSPITAL | Age: 81
End: 2021-04-09

## 2021-04-09 DIAGNOSIS — E03.9 ACQUIRED HYPOTHYROIDISM: Primary | ICD-10-CM

## 2021-04-09 DIAGNOSIS — E03.9 ACQUIRED HYPOTHYROIDISM: ICD-10-CM

## 2021-04-09 LAB
T4 FREE SERPL-MCNC: 0.93 NG/DL (ref 0.76–1.46)
TSH SERPL DL<=0.05 MIU/L-ACNC: 2.89 UIU/ML (ref 0.36–3.74)

## 2021-04-09 PROCEDURE — 84443 ASSAY THYROID STIM HORMONE: CPT

## 2021-04-09 PROCEDURE — 36415 COLL VENOUS BLD VENIPUNCTURE: CPT

## 2021-04-09 PROCEDURE — 84439 ASSAY OF FREE THYROXINE: CPT

## 2021-07-08 ENCOUNTER — APPOINTMENT (EMERGENCY)
Dept: CT IMAGING | Facility: HOSPITAL | Age: 81
DRG: 684 | End: 2021-07-08
Payer: MEDICARE

## 2021-07-08 ENCOUNTER — APPOINTMENT (EMERGENCY)
Dept: RADIOLOGY | Facility: HOSPITAL | Age: 81
DRG: 684 | End: 2021-07-08
Payer: MEDICARE

## 2021-07-08 ENCOUNTER — HOSPITAL ENCOUNTER (INPATIENT)
Facility: HOSPITAL | Age: 81
LOS: 1 days | Discharge: HOME | DRG: 684 | End: 2021-07-10
Attending: STUDENT IN AN ORGANIZED HEALTH CARE EDUCATION/TRAINING PROGRAM | Admitting: FAMILY MEDICINE
Payer: MEDICARE

## 2021-07-08 DIAGNOSIS — I10 ESSENTIAL HYPERTENSION: ICD-10-CM

## 2021-07-08 DIAGNOSIS — R42 LIGHTHEADEDNESS: ICD-10-CM

## 2021-07-08 DIAGNOSIS — I49.3 PVC'S (PREMATURE VENTRICULAR CONTRACTIONS): ICD-10-CM

## 2021-07-08 DIAGNOSIS — N17.9 ACUTE KIDNEY INJURY (HCC): Primary | ICD-10-CM

## 2021-07-08 PROBLEM — T14.8XXA ABRASION: Status: ACTIVE | Noted: 2021-07-08

## 2021-07-08 LAB
ANION GAP SERPL CALCULATED.3IONS-SCNC: 9 MMOL/L (ref 4–13)
BASOPHILS # BLD AUTO: 0.02 THOUSANDS/ΜL (ref 0–0.1)
BASOPHILS NFR BLD AUTO: 0 % (ref 0–1)
BUN SERPL-MCNC: 45 MG/DL (ref 5–25)
CALCIUM SERPL-MCNC: 9 MG/DL (ref 8.3–10.1)
CHLORIDE SERPL-SCNC: 102 MMOL/L (ref 100–108)
CO2 SERPL-SCNC: 24 MMOL/L (ref 21–32)
CREAT SERPL-MCNC: 2.35 MG/DL (ref 0.6–1.3)
EOSINOPHIL # BLD AUTO: 0.05 THOUSAND/ΜL (ref 0–0.61)
EOSINOPHIL NFR BLD AUTO: 1 % (ref 0–6)
ERYTHROCYTE [DISTWIDTH] IN BLOOD BY AUTOMATED COUNT: 13.4 % (ref 11.6–15.1)
GFR SERPL CREATININE-BSD FRML MDRD: 26 ML/MIN/1.73SQ M
GLUCOSE SERPL-MCNC: 108 MG/DL (ref 65–140)
GLUCOSE SERPL-MCNC: 167 MG/DL (ref 65–140)
HCT VFR BLD AUTO: 40.9 % (ref 36.5–49.3)
HGB BLD-MCNC: 14.2 G/DL (ref 12–17)
IMM GRANULOCYTES # BLD AUTO: 0.05 THOUSAND/UL (ref 0–0.2)
IMM GRANULOCYTES NFR BLD AUTO: 1 % (ref 0–2)
LYMPHOCYTES # BLD AUTO: 2.22 THOUSANDS/ΜL (ref 0.6–4.47)
LYMPHOCYTES NFR BLD AUTO: 24 % (ref 14–44)
MAGNESIUM SERPL-MCNC: 2 MG/DL (ref 1.6–2.6)
MCH RBC QN AUTO: 30.2 PG (ref 26.8–34.3)
MCHC RBC AUTO-ENTMCNC: 34.7 G/DL (ref 31.4–37.4)
MCV RBC AUTO: 87 FL (ref 82–98)
MONOCYTES # BLD AUTO: 0.88 THOUSAND/ΜL (ref 0.17–1.22)
MONOCYTES NFR BLD AUTO: 10 % (ref 4–12)
NEUTROPHILS # BLD AUTO: 6 THOUSANDS/ΜL (ref 1.85–7.62)
NEUTS SEG NFR BLD AUTO: 64 % (ref 43–75)
NRBC BLD AUTO-RTO: 0 /100 WBCS
PHOSPHATE SERPL-MCNC: 3.9 MG/DL (ref 2.3–4.1)
PLATELET # BLD AUTO: 191 THOUSANDS/UL (ref 149–390)
PMV BLD AUTO: 10.6 FL (ref 8.9–12.7)
POTASSIUM SERPL-SCNC: 4.3 MMOL/L (ref 3.5–5.3)
RBC # BLD AUTO: 4.7 MILLION/UL (ref 3.88–5.62)
SODIUM SERPL-SCNC: 135 MMOL/L (ref 136–145)
TROPONIN I SERPL-MCNC: <0.02 NG/ML
TSH SERPL DL<=0.05 MIU/L-ACNC: 4.14 UIU/ML (ref 0.36–3.74)
WBC # BLD AUTO: 9.22 THOUSAND/UL (ref 4.31–10.16)

## 2021-07-08 PROCEDURE — 90715 TDAP VACCINE 7 YRS/> IM: CPT | Performed by: FAMILY MEDICINE

## 2021-07-08 PROCEDURE — 96365 THER/PROPH/DIAG IV INF INIT: CPT

## 2021-07-08 PROCEDURE — 84100 ASSAY OF PHOSPHORUS: CPT | Performed by: STUDENT IN AN ORGANIZED HEALTH CARE EDUCATION/TRAINING PROGRAM

## 2021-07-08 PROCEDURE — 99285 EMERGENCY DEPT VISIT HI MDM: CPT | Performed by: STUDENT IN AN ORGANIZED HEALTH CARE EDUCATION/TRAINING PROGRAM

## 2021-07-08 PROCEDURE — 70450 CT HEAD/BRAIN W/O DYE: CPT

## 2021-07-08 PROCEDURE — 99285 EMERGENCY DEPT VISIT HI MDM: CPT

## 2021-07-08 PROCEDURE — 84484 ASSAY OF TROPONIN QUANT: CPT | Performed by: STUDENT IN AN ORGANIZED HEALTH CARE EDUCATION/TRAINING PROGRAM

## 2021-07-08 PROCEDURE — 82948 REAGENT STRIP/BLOOD GLUCOSE: CPT

## 2021-07-08 PROCEDURE — G1004 CDSM NDSC: HCPCS

## 2021-07-08 PROCEDURE — 83735 ASSAY OF MAGNESIUM: CPT | Performed by: STUDENT IN AN ORGANIZED HEALTH CARE EDUCATION/TRAINING PROGRAM

## 2021-07-08 PROCEDURE — 84443 ASSAY THYROID STIM HORMONE: CPT | Performed by: FAMILY MEDICINE

## 2021-07-08 PROCEDURE — 80048 BASIC METABOLIC PNL TOTAL CA: CPT | Performed by: STUDENT IN AN ORGANIZED HEALTH CARE EDUCATION/TRAINING PROGRAM

## 2021-07-08 PROCEDURE — 99220 PR INITIAL OBSERVATION CARE/DAY 70 MINUTES: CPT | Performed by: FAMILY MEDICINE

## 2021-07-08 PROCEDURE — 1123F ACP DISCUSS/DSCN MKR DOCD: CPT | Performed by: STUDENT IN AN ORGANIZED HEALTH CARE EDUCATION/TRAINING PROGRAM

## 2021-07-08 PROCEDURE — 71045 X-RAY EXAM CHEST 1 VIEW: CPT

## 2021-07-08 PROCEDURE — 85025 COMPLETE CBC W/AUTO DIFF WBC: CPT | Performed by: STUDENT IN AN ORGANIZED HEALTH CARE EDUCATION/TRAINING PROGRAM

## 2021-07-08 PROCEDURE — 36415 COLL VENOUS BLD VENIPUNCTURE: CPT | Performed by: STUDENT IN AN ORGANIZED HEALTH CARE EDUCATION/TRAINING PROGRAM

## 2021-07-08 PROCEDURE — 93005 ELECTROCARDIOGRAM TRACING: CPT

## 2021-07-08 RX ORDER — CLOPIDOGREL BISULFATE 75 MG/1
75 TABLET ORAL DAILY
Status: DISCONTINUED | OUTPATIENT
Start: 2021-07-09 | End: 2021-07-10 | Stop reason: HOSPADM

## 2021-07-08 RX ORDER — MECLIZINE HCL 12.5 MG/1
12.5 TABLET ORAL EVERY 8 HOURS PRN
Status: DISCONTINUED | OUTPATIENT
Start: 2021-07-08 | End: 2021-07-10 | Stop reason: HOSPADM

## 2021-07-08 RX ORDER — ONDANSETRON 2 MG/ML
4 INJECTION INTRAMUSCULAR; INTRAVENOUS EVERY 6 HOURS PRN
Status: DISCONTINUED | OUTPATIENT
Start: 2021-07-08 | End: 2021-07-10 | Stop reason: HOSPADM

## 2021-07-08 RX ORDER — POLYETHYLENE GLYCOL 3350 17 G/17G
17 POWDER, FOR SOLUTION ORAL DAILY
Status: DISCONTINUED | OUTPATIENT
Start: 2021-07-09 | End: 2021-07-10 | Stop reason: HOSPADM

## 2021-07-08 RX ORDER — AMLODIPINE BESYLATE 5 MG/1
5 TABLET ORAL DAILY
Status: DISCONTINUED | OUTPATIENT
Start: 2021-07-09 | End: 2021-07-10 | Stop reason: HOSPADM

## 2021-07-08 RX ORDER — ASPIRIN 81 MG/1
324 TABLET ORAL DAILY
Status: DISCONTINUED | OUTPATIENT
Start: 2021-07-09 | End: 2021-07-10 | Stop reason: HOSPADM

## 2021-07-08 RX ORDER — ACETAMINOPHEN 325 MG/1
650 TABLET ORAL EVERY 6 HOURS PRN
Status: DISCONTINUED | OUTPATIENT
Start: 2021-07-08 | End: 2021-07-10 | Stop reason: HOSPADM

## 2021-07-08 RX ORDER — ATORVASTATIN CALCIUM 40 MG/1
40 TABLET, FILM COATED ORAL
Status: DISCONTINUED | OUTPATIENT
Start: 2021-07-08 | End: 2021-07-10 | Stop reason: HOSPADM

## 2021-07-08 RX ORDER — LEVOTHYROXINE SODIUM 0.03 MG/1
25 TABLET ORAL DAILY
Status: DISCONTINUED | OUTPATIENT
Start: 2021-07-09 | End: 2021-07-10 | Stop reason: HOSPADM

## 2021-07-08 RX ORDER — SODIUM CHLORIDE 9 MG/ML
125 INJECTION, SOLUTION INTRAVENOUS CONTINUOUS
Status: DISCONTINUED | OUTPATIENT
Start: 2021-07-08 | End: 2021-07-10 | Stop reason: HOSPADM

## 2021-07-08 RX ADMIN — TETANUS TOXOID, REDUCED DIPHTHERIA TOXOID AND ACELLULAR PERTUSSIS VACCINE, ADSORBED 0.5 ML: 5; 2.5; 8; 8; 2.5 SUSPENSION INTRAMUSCULAR at 20:26

## 2021-07-08 RX ADMIN — SODIUM CHLORIDE, SODIUM LACTATE, POTASSIUM CHLORIDE, AND CALCIUM CHLORIDE 1000 ML: .6; .31; .03; .02 INJECTION, SOLUTION INTRAVENOUS at 16:18

## 2021-07-08 RX ADMIN — SODIUM CHLORIDE 125 ML/HR: 0.9 INJECTION, SOLUTION INTRAVENOUS at 19:23

## 2021-07-08 NOTE — ASSESSMENT & PLAN NOTE
Could be medication side effect as well as dehydration  Hold patient's home blood pressure medication  Baseline creatinine is 1 18  Today creatinine is 2 35  Continue IV hydration  Avoid hypotension

## 2021-07-08 NOTE — PLAN OF CARE
Problem: Potential for Falls  Goal: Patient will remain free of falls  Description: INTERVENTIONS:  - Educate patient/family on patient safety including physical limitations  - Instruct patient to call for assistance with activity   - Consult OT/PT to assist with strengthening/mobility   - Keep Call bell within reach  - Keep bed low and locked with side rails adjusted as appropriate  - Keep care items and personal belongings within reach  - Initiate and maintain comfort rounds  - Make Fall Risk Sign visible to staff  - Offer Toileting every 2 Hours, in advance of need  - Initiate/Maintain bed/chair alarm  - Obtain necessary fall risk management equipment:   - Apply yellow socks and bracelet for high fall risk patients  - Consider moving patient to room near nurses station  Outcome: Progressing     Problem: MUSCULOSKELETAL - ADULT  Goal: Maintain or return mobility to safest level of function  Description: INTERVENTIONS:  - Assess patient's ability to carry out ADLs; assess patient's baseline for ADL function and identify physical deficits which impact ability to perform ADLs (bathing, care of mouth/teeth, toileting, grooming, dressing, etc )  - Assess/evaluate cause of self-care deficits   - Assess range of motion  - Assess patient's mobility  - Assess patient's need for assistive devices and provide as appropriate  - Encourage maximum independence but intervene and supervise when necessary  - Involve family in performance of ADLs  - Assess for home care needs following discharge   - Consider OT consult to assist with ADL evaluation and planning for discharge  - Provide patient education as appropriate  Outcome: Progressing  Goal: Maintain proper alignment of affected body part  Description: INTERVENTIONS:  - Support, maintain and protect limb and body alignment  - Provide patient/ family with appropriate education  Outcome: Progressing     Problem: SAFETY ADULT  Goal: Patient will remain free of falls  Description: INTERVENTIONS:  - Educate patient/family on patient safety including physical limitations  - Instruct patient to call for assistance with activity   - Consult OT/PT to assist with strengthening/mobility   - Keep Call bell within reach  - Keep bed low and locked with side rails adjusted as appropriate  - Keep care items and personal belongings within reach  - Initiate and maintain comfort rounds  - Make Fall Risk Sign visible to staff  - Offer Toileting every 2 Hours, in advance of need  - Initiate/Maintain bed/chair alarm  - Obtain necessary fall risk management equipment:   - Apply yellow socks and bracelet for high fall risk patients  - Consider moving patient to room near nurses station  Outcome: Progressing  Goal: Maintain or return to baseline ADL function  Description: INTERVENTIONS:  -  Assess patient's ability to carry out ADLs; assess patient's baseline for ADL function and identify physical deficits which impact ability to perform ADLs (bathing, care of mouth/teeth, toileting, grooming, dressing, etc )  - Assess/evaluate cause of self-care deficits   - Assess range of motion  - Assess patient's mobility; develop plan if impaired  - Assess patient's need for assistive devices and provide as appropriate  - Encourage maximum independence but intervene and supervise when necessary  - Involve family in performance of ADLs  - Assess for home care needs following discharge   - Consider OT consult to assist with ADL evaluation and planning for discharge  - Provide patient education as appropriate  Outcome: Progressing  Goal: Maintains/Returns to pre admission functional level  Description: INTERVENTIONS:  - Perform BMAT or MOVE assessment daily    - Set and communicate daily mobility goal to care team and patient/family/caregiver  - Collaborate with rehabilitation services on mobility goals if consulted  - Perform Range of Motion 3times a day  - Reposition patient every 2 hours    - Dangle patient 3 times a day  - Stand patient 3 times a day  - Ambulate patient 3 times a day  - Out of bed to chair 3 times a day   - Out of bed for meals 3 times a day  - Out of bed for toileting  - Record patient progress and toleration of activity level   Outcome: Progressing     Problem: DISCHARGE PLANNING  Goal: Discharge to home or other facility with appropriate resources  Description: INTERVENTIONS:  - Identify barriers to discharge w/patient and caregiver  - Arrange for needed discharge resources and transportation as appropriate  - Identify discharge learning needs (meds, wound care, etc )  - Arrange for interpretive services to assist at discharge as needed  - Refer to Case Management Department for coordinating discharge planning if the patient needs post-hospital services based on physician/advanced practitioner order or complex needs related to functional status, cognitive ability, or social support system  Outcome: Progressing     Problem: Knowledge Deficit  Goal: Patient/family/caregiver demonstrates understanding of disease process, treatment plan, medications, and discharge instructions  Description: Complete learning assessment and assess knowledge base    Interventions:  - Provide teaching at level of understanding  - Provide teaching via preferred learning methods  Outcome: Progressing

## 2021-07-08 NOTE — ASSESSMENT & PLAN NOTE
Patient does have history of CABG  Complaining dizziness as well as multiple fall  Troponin negative  Monitor patient on telemetry

## 2021-07-08 NOTE — ED PROVIDER NOTES
History  Chief Complaint   Patient presents with    Dizziness     starting Monday feeling weak and dizzy especially when bending over and standing back up, states feels offbalance       Dizziness  Quality:  Lightheadedness  Severity:  Moderate  Onset quality:  Gradual  Timing:  Intermittent  Progression:  Waxing and waning  Chronicity:  New  Context: standing up    Relieved by:  Nothing  Worsened by:  Standing up  Ineffective treatments:  None tried  Associated symptoms: no blood in stool, no chest pain, no diarrhea, no nausea, no palpitations, no shortness of breath, no syncope and no weakness    Risk factors: heart disease       62-year-old male  Past medical history significant for coronary artery disease status post CABG, hypertension, hyperlipidemia  States that over the past 3 days, he has been having progressively worsening generalized weakness, lightheadedness  States that his symptoms are exacerbated when he stands from a seated position  Has been eating well but does not typically drink a lot of water  Has been very active even during the summer months  Denies recent illness, headache, altered mental status, chest pain, shortness of breath, abdominal pain, nausea, vomiting, diarrhea, dysuria  Prior to Admission Medications   Prescriptions Last Dose Informant Patient Reported? Taking?    Multiple Vitamin (MULTIVITAMIN) capsule  Self Yes Yes   Sig: Take 1 capsule by mouth daily   Omega-3 Fatty Acids (FISH OIL) 1200 MG CAPS   Yes Yes   Sig: Take by mouth   aspirin (ECOTRIN LOW STRENGTH) 81 mg EC tablet   Yes Yes   Sig: Take 81 mg by mouth daily   clopidogrel (PLAVIX) 75 mg tablet   Yes Yes   Sig: Take 75 mg by mouth daily   levothyroxine 25 mcg tablet   No Yes   Sig: Take 1 tablet by mouth once daily   lisinopril-hydrochlorothiazide (PRINZIDE,ZESTORETIC) 10-12 5 MG per tablet   No Yes   Sig: Take 1 tablet by mouth daily   simvastatin (ZOCOR) 40 mg tablet   No No   Sig: TAKE 1 TABLET BY MOUTH ONCE DAILY AT BEDTIME      Facility-Administered Medications: None       Past Medical History:   Diagnosis Date    Chronic ischemic heart disease     Coronary artery disease     hx cabg x4 2007    Hearing loss     Hyperlipidemia     Hypertension     Hypokalemia     Hypothyroid     OA (osteoarthritis)     left knee    Prediabetes     S/P AVR     2007    Type 2 diabetes mellitus ([address] )     Wears dentures     full upper    Wears glasses        Past Surgical History:   Procedure Laterality Date    AORTIC VALVE REPLACEMENT      BACK SURGERY      lumbar     CATARACT EXTRACTION Bilateral     CORONARY ARTERY BYPASS GRAFT      x4    2007    KNEE ARTHROSCOPY Bilateral     [place] TOTAL KNEE ARTHROPLASTY Left 1/17/2018    Procedure: ARTHROPLASTY KNEE TOTAL;  Surgeon: Arnaldo Soulier, MD;  Location: [facility];  Service: Orthopedics    SHOULDER ARTHROSCOPY Right        Family History   Problem Relation Age of Onset    Cancer Mother      I have reviewed and agree with the history as documented  E-Cigarette/Vaping    E-Cigarette Use Never User      E-Cigarette/Vaping Substances     Social History     Tobacco Use    Smoking status: Never Smoker    Smokeless tobacco: Never Used   Vaping Use    Vaping Use: Never used   Substance Use Topics    Alcohol use: No    Drug use: No       Review of Systems   Constitutional: Positive for fatigue  Negative for chills and fever  HENT: Negative for congestion, rhinorrhea and sinus pain  Eyes: Negative for pain and visual disturbance  Respiratory: Negative for chest tightness and shortness of breath  Cardiovascular: Negative for chest pain, palpitations and syncope  Gastrointestinal: Negative for abdominal pain, blood in stool, diarrhea and nausea  Genitourinary: Negative for difficulty urinating, flank pain and urgency  Musculoskeletal: Negative for back pain and myalgias  Skin: Negative for color change and rash     Neurological: Positive for dizziness and light-headedness  Negative for syncope and weakness  Psychiatric/Behavioral: Negative for confusion and decreased concentration  All other systems reviewed and are negative  Physical Exam  Physical Exam  Vitals and nursing note reviewed  Constitutional:       General: He is not in acute distress  Appearance: He is not ill-appearing or toxic-appearing  HENT:      Head: Normocephalic and atraumatic  Right Ear: External ear normal       Left Ear: External ear normal       Nose: No congestion or rhinorrhea  Mouth/Throat:      Mouth: Mucous membranes are moist       Pharynx: Oropharynx is clear  No oropharyngeal exudate or posterior oropharyngeal erythema  Eyes:      Extraocular Movements: Extraocular movements intact  Pupils: Pupils are equal, round, and reactive to light  Cardiovascular:      Rate and Rhythm: Normal rate and regular rhythm  Pulses: Normal pulses  Heart sounds: Normal heart sounds  Pulmonary:      Effort: Pulmonary effort is normal       Breath sounds: Normal breath sounds  Abdominal:      General: Abdomen is flat  Palpations: Abdomen is soft  Tenderness: There is no abdominal tenderness  Musculoskeletal:         General: No swelling or tenderness  Normal range of motion  Cervical back: Neck supple  No tenderness  Skin:     General: Skin is warm and dry  Capillary Refill: Capillary refill takes less than 2 seconds  Neurological:      General: No focal deficit present  Mental Status: He is alert and oriented to person, place, and time  Mental status is at baseline  Cranial Nerves: No cranial nerve deficit  Sensory: No sensory deficit  Motor: No weakness     Psychiatric:         Mood and Affect: Mood normal          Behavior: Behavior normal          Vital Signs  ED Triage Vitals   Temperature Pulse Respirations Blood Pressure SpO2   07/08/21 1602 07/08/21 1558 07/08/21 1558 07/08/21 1558 07/08/21 1558   (!) 97 °F (36 1 °C) 69 18 118/80 97 %      Temp Source Heart Rate Source Patient Position - Orthostatic VS BP Location FiO2 (%)   07/08/21 1602 07/08/21 1558 07/08/21 1558 07/08/21 1558 --   Temporal Monitor Lying Right arm       Pain Score       --                  Vitals:    07/08/21 1558 07/08/21 1612 07/08/21 1613   BP: 118/80 126/60 114/65   Pulse: 69 105 65   Patient Position - Orthostatic VS: Lying Sitting - Orthostatic VS Standing - Orthostatic VS     ED Medications  Medications   lactated ringers bolus 1,000 mL (1,000 mL Intravenous New Bag 7/8/21 1618)     Diagnostic Studies  Results Reviewed     Procedure Component Value Units Date/Time    Troponin I [[id number]]  (Normal) Collected: 07/08/21 1611    Lab Status: Final result Specimen: Blood from Arm, Left Updated: 07/08/21 1638     Troponin I <0 02 ng/mL     Basic metabolic panel [[id number]]  (Abnormal) Collected: 07/08/21 1611    Lab Status: Final result Specimen: Blood from Arm, Left Updated: 07/08/21 1632     Sodium 135 mmol/L      Potassium 4 3 mmol/L      Chloride 102 mmol/L      CO2 24 mmol/L      ANION GAP 9 mmol/L      BUN 45 mg/dL      Creatinine 2 35 mg/dL      Glucose 108 mg/dL      Calcium 9 0 mg/dL      eGFR 26 ml/min/1 73sq m     Narrative:      [place] guidelines for Chronic Kidney Disease (CKD):     Stage 1 with normal or high GFR (GFR > 90 mL/min/1 73 square meters)    Stage 2 Mild CKD (GFR = 60-89 mL/min/1 73 square meters)    Stage 3A Moderate CKD (GFR = 45-59 mL/min/1 73 square meters)    Stage 3B Moderate CKD (GFR = 30-44 mL/min/1 73 square meters)    Stage 4 Severe CKD (GFR = 15-29 mL/min/1 73 square meters)    Stage 5 End Stage CKD (GFR <15 mL/min/1 73 square meters)  Note: GFR calculation is accurate only with a steady state creatinine    Magnesium [[id number]]  (Normal) Collected: 07/08/21 1611    Lab Status: Final result Specimen: Blood from Arm, Left Updated: 07/08/21 1632     Magnesium 2 0 mg/dL Phosphorus [[id number]]  (Normal) Collected: 07/08/21 1611    Lab Status: Final result Specimen: Blood from Arm, Left Updated: 07/08/21 1632     Phosphorus 3 9 mg/dL     CBC and differential [[id number]] Collected: 07/08/21 1611    Lab Status: Final result Specimen: Blood from Arm, Left Updated: 07/08/21 1620     WBC 9 22 Thousand/uL      RBC 4 70 Million/uL      Hemoglobin 14 2 g/dL      Hematocrit 40 9 %      MCV 87 fL      MCH 30 2 pg      MCHC 34 7 g/dL      RDW 13 4 %      MPV 10 6 fL      Platelets 607 Thousands/uL      nRBC 0 /100 WBCs      Neutrophils Relative 64 %      Immat GRANS % 1 %      Lymphocytes Relative 24 %      Monocytes Relative 10 %      Eosinophils Relative 1 %      Basophils Relative 0 %      Neutrophils Absolute 6 00 Thousands/µL      Immature Grans Absolute 0 05 Thousand/uL      Lymphocytes Absolute 2 22 Thousands/µL      Monocytes Absolute 0 88 Thousand/µL      Eosinophils Absolute 0 05 Thousand/µL      Basophils Absolute 0 02 Thousands/µL     UA w Reflex to Microscopic w Reflex to Culture [[id number]]     Lab Status: No result Specimen: Urine              XR chest 1 view portable   ED Interpretation by Genny Meek DO (07/08 1633)   No acute cardiopulmonary disease noted on chest x-ray  CT head without contrast   Final Result by Carlin Cheng MD (07/08 1642)      No acute intracranial abnormality  Workstation performed: [id number]                Procedures  ECG 12 Lead Documentation Only    Date/Time: 7/8/2021 3:58 PM  Performed by: Genny Meek DO  Authorized by:  Genny Meek DO     Indications / Diagnosis:  Lightheadedness  ECG reviewed by me, the ED Provider: yes    Patient location:  ED  Interpretation:     Interpretation: non-specific    Rate:     ECG rate:  78    ECG rate assessment: normal    Rhythm:     Rhythm: sinus rhythm    Ectopy:     Ectopy: PVCs      PVCs:  Infrequent  QRS:     QRS axis:  Normal    QRS intervals:  Normal  Conduction: Conduction: abnormal      Abnormal conduction: 1st degree    ST segments:     ST segments:  Normal  T waves:     T waves: normal        ED Course       SBIRT 20yo+      Most Recent Value   SBIRT (24 yo +)   In order to provide better care to our patients, we are screening all of our patients for alcohol and drug use  Would it be okay to ask you these screening questions? Yes Filed at: 07/08/2021 5788   Initial Alcohol Screen: US AUDIT-C    1  How often do you have a drink containing alcohol?  0 Filed at: 07/08/2021 1602   2  How many drinks containing alcohol do you have on a typical day you are drinking? 0 Filed at: 07/08/2021 1602   3a  Male UNDER 65: How often do you have five or more drinks on one occasion? 0 Filed at: 07/08/2021 1602   3b  FEMALE Any Age, or MALE 65+: How often do you have 4 or more drinks on one occassion? 0 Filed at: 07/08/2021 1602   Audit-C Score  0 Filed at: 07/08/2021 8880   PRINCESS: How many times in the past year have you    Used an illegal drug or used a prescription medication for non-medical reasons? Never Filed at: 07/08/2021 8803        MDM     74-year-old M  History coronary artery disease status post CABG hypertension, hyperlipidemia  Been lightheadedness over the past few days  Exacerbated with movement especially with bending over/standing from a seated position  States that he has not been hydrating well over the past few days  ECG showing no acute ischemic changes/unstable rib venous  Infrequent PVCs  Workup significant for acute kidney injury  No significant electrolyte abnormalities  Imaging negative for acute findings  The patient was administered a bolus of IV fluids  Admitted to the Internal Medicine Service  The patient was stable under my care      Disposition  Final diagnoses:   Acute kidney injury ([address] )   Lightheadedness   PVC's (premature ventricular contractions)     Time reflects when diagnosis was documented in both MDM as applicable and the Disposition within this note     Time User Action Codes Description Comment    7/8/2021  5:03 PM Lajune Spray Add [N17 9] Acute kidney injury ([address] )     7/8/2021  5:03 PM Lajune Spray Add [R42] Lightheadedness     7/8/2021  5:03 PM Lajune Spray Add [I49 3] PVC's (premature ventricular contractions)       ED Disposition     ED Disposition Condition Date/Time Comment    Admit Stable Thu Jul 8, 2021  5:03 PM Case was discussed with Dr Sruthi Templeton and the patient's admission status was agreed to be Admission Status: observation status to the service of Dr Sruthi Templeton          [facility]    None         Patient's Medications   Discharge Prescriptions    No medications on file     No discharge procedures on file      PDMP Review     None          ED Provider  Electronically Signed by           Nikos Marquez DO  07/08/21 6969

## 2021-07-08 NOTE — ASSESSMENT & PLAN NOTE
Unknown etiology-complicated with multiple fall as per family member  Patient does have significant cardiac history-troponin negative, initial EKG is sinus bradycardia with AV block  NIH is 0  No significant orthostatic hypotension noted  Continue neuro checks  Monitor on telemetry  Follow-up PT/OT recommendation

## 2021-07-08 NOTE — H&P
Alhaji Downsnata 1940, 68 y o  male MRN: [id number]  Unit/Bed#: -01 Encounter: [id number]  Primary Care Provider: Apolonia Garcia MD   Date and time admitted to hospital: 7/8/2021  3:46 PM    * RIYA (acute kidney injury) [facility])  Assessment & Plan  Could be medication side effect as well as dehydration  Hold patient's home blood pressure medication  Baseline creatinine is 1 18  Today creatinine is 2 35  Continue IV hydration  Avoid hypotension    Abrasion  Assessment & Plan  Located the base of left finger  Clean in nature  Provide Tdap  Maintain wound care    Dizzinesses  Assessment & Plan  Unknown etiology-complicated with multiple fall as per family member  Patient does have significant cardiac history-troponin negative, initial EKG is sinus bradycardia with AV block  NIH is 0  No significant orthostatic hypotension noted  Continue neuro checks  Monitor on telemetry  Follow-up PT/OT recommendation    Acquired hypothyroidism  Assessment & Plan  Continue thyroid medication    Coronary artery disease involving coronary bypass graft of native heart without angina pectoris  Assessment & Plan  Patient does have history of CABG  Complaining dizziness as well as multiple fall  Troponin negative  Monitor patient on telemetry    Type 2 diabetes mellitus without complication, without long-term current use of insulin (HCC)  Assessment & Plan  Lab Results   Component Value Date    HGBA1C 6 1 04/06/2021       No results for input(s): POCGLU in the last 72 hours  Blood Sugar Average: Last 72 hrs:   controlled by diet  Monitor fingerstick blood glucose  Follow hypoglycemia precaution    VTE Pharmacologic Prophylaxis: VTE Score: 4 Moderate Risk (Score 3-4) - Pharmacological DVT Prophylaxis Ordered: enoxaparin (Lovenox)  Code Status: Level 1 - Full Code as per patient  Discussion with family: Updated  (Family members on bedside) at bedside      Anticipated Length of Stay: Patient will be admitted on an observation basis with an anticipated length of stay of less than 2 midnights secondary to To monitor above condition  Total Time for Visit, including Counseling / Coordination of Care: 45 minutes Greater than 50% of this total time spent on direct patient counseling and coordination of care  Chief Complaint:  Dizziness    History of Present Illness:  Tim Ware is a 68 y o  male with a PMH of coronary artery disease, hypothyroidism who presents with dizziness  As per patient, for the past few days, is feeling dizzy and not feeling well  Patient reports he feels unusual and lightheaded every time he tried to changes position  Denies any ear problem, any nausea, vomiting  But reports he feels uncomfortable  Has history of CABG long time elbow as well as heart valve replacement  Patient also reports normally he does not drink enough fluid  Denies any recent diarrhea  Does not smoke or drink alcohol  Review of Systems:  Review of Systems   Constitutional: Positive for activity change  Negative for appetite change, chills, diaphoresis, fatigue, fever and unexpected weight change  HENT: Negative for congestion and voice change  Eyes: Negative for photophobia and visual disturbance  Respiratory: Negative for apnea, shortness of breath, wheezing and stridor  Cardiovascular: Negative for chest pain, palpitations and leg swelling  Gastrointestinal: Negative for abdominal distention, abdominal pain, diarrhea, nausea, rectal pain and vomiting  Genitourinary: Negative for difficulty urinating and dysuria  Musculoskeletal: Negative for arthralgias  Skin: Positive for wound  Negative for color change  Neurological: Positive for dizziness and light-headedness  Negative for tremors, seizures, syncope, speech difficulty, weakness and numbness  Hematological: Negative for adenopathy     Psychiatric/Behavioral: Negative for agitation, behavioral problems and confusion  All other systems reviewed and are negative  Past Medical and Surgical History:   Past Medical History:   Diagnosis Date    Chronic ischemic heart disease     Coronary artery disease     hx cabg x4 2007    Hearing loss     Hyperlipidemia     Hypertension     Hypokalemia     Hypothyroid     OA (osteoarthritis)     left knee    Prediabetes     S/P AVR     2007    Type 2 diabetes mellitus ([address] )     Wears dentures     full upper    Wears glasses        Past Surgical History:   Procedure Laterality Date    AORTIC VALVE REPLACEMENT      BACK SURGERY      lumbar     CATARACT EXTRACTION Bilateral     CORONARY ARTERY BYPASS GRAFT      x4    2007    KNEE ARTHROSCOPY Bilateral     [place] TOTAL KNEE ARTHROPLASTY Left 1/17/2018    Procedure: ARTHROPLASTY KNEE TOTAL;  Surgeon: Aicha Freeman MD;  Location: [facility];  Service: Orthopedics    SHOULDER ARTHROSCOPY Right        Meds/Allergies:  Prior to Admission medications    Medication Sig Start Date End Date Taking?  Authorizing Provider   aspirin (ECOTRIN LOW STRENGTH) 81 mg EC tablet Take 324 mg by mouth daily    Yes Historical Provider, MD   clopidogrel (PLAVIX) 75 mg tablet Take 75 mg by mouth daily   Yes Historical Provider, MD   levothyroxine 25 mcg tablet Take 1 tablet by mouth once daily 10/15/20  Yes Gaby Contreras MD   lisinopril-hydrochlorothiazide (PRINZIDE,ZESTORETIC) 10-12 5 MG per tablet Take 1 tablet by mouth daily 1/13/21  Yes Gaby Contreras MD   Multiple Vitamin (MULTIVITAMIN) capsule Take 1 capsule by mouth daily   Yes Historical Provider, MD   Omega-3 Fatty Acids (FISH OIL) 1200 MG CAPS Take 4 each by mouth daily    Yes Historical Provider, MD   simvastatin (ZOCOR) 40 mg tablet TAKE 1 TABLET BY MOUTH ONCE DAILY AT BEDTIME  Patient taking differently: Take by mouth daily  10/15/20  Yes Gaby Contreras MD   Magnesium 400 MG CAPS Take 1 capsule by mouth daily  7/8/21  Historical Provider, MD     I have reviewed home medications with patient personally  Allergies: Allergies   Allergen Reactions    Percocet [Oxycodone-Acetaminophen] Itching     Throat swelling       Social History:  Marital Status: /Civil Union   Occupation:  Unknown  Patient Pre-hospital Living Situation: Home  Patient Pre-hospital Level of Mobility: walks  Patient Pre-hospital Diet Restrictions:  Cardiac diet  Substance Use History:   Social History     Substance and Sexual Activity   Alcohol Use No     Social History     Tobacco Use   Smoking Status Never Smoker   Smokeless Tobacco Never Used     Social History     Substance and Sexual Activity   Drug Use No       Family History:  non-contributory    Physical Exam:     Vitals:   Blood Pressure: 123/76 (07/08/21 1726)  Pulse: 69 (07/08/21 1732)  Temperature: 97 8 °F (36 6 °C) (07/08/21 1726)  Temp Source: Oral (07/08/21 1726)  Respirations: 19 (07/08/21 1726)  Height: 5' 2" (157 5 cm) (07/08/21 1758)  Weight - Scale: 75 3 kg (166 lb 0 1 oz) (07/08/21 1558)  SpO2: 95 % (07/08/21 1732)    Physical Exam  Vitals and nursing note reviewed  Exam conducted with a chaperone present  Constitutional:       Appearance: He is not diaphoretic  HENT:      Head: Normocephalic  Nose: No congestion  Mouth/Throat:      Mouth: Mucous membranes are moist       Pharynx: No oropharyngeal exudate  Eyes:      General: No scleral icterus  Conjunctiva/sclera: Conjunctivae normal       Pupils: Pupils are equal, round, and reactive to light  Cardiovascular:      Rate and Rhythm: Normal rate  Heart sounds: Murmur heard  No friction rub  No gallop  Pulmonary:      Effort: Pulmonary effort is normal  No respiratory distress  Breath sounds: No stridor  No wheezing, rhonchi or rales  Chest:      Chest wall: No tenderness  Abdominal:      General: Abdomen is flat  Bowel sounds are normal  There is no distension  Palpations: There is no mass  Tenderness:  There is no abdominal tenderness  There is no guarding  Hernia: No hernia is present  Musculoskeletal:         General: Normal range of motion  Arms:       Cervical back: Normal range of motion  Right lower leg: No edema  Left lower leg: No edema  Lymphadenopathy:      Cervical: No cervical adenopathy  Skin:     General: Skin is warm  Capillary Refill: Capillary refill takes less than 2 seconds  Findings: No lesion or rash  Neurological:      General: No focal deficit present  Mental Status: He is alert and oriented to person, place, and time  Cranial Nerves: No cranial nerve deficit  Sensory: No sensory deficit  Psychiatric:         Mood and Affect: Mood normal          Additional Data:     Lab Results:  Results from last 7 days   Lab Units 07/08/21  1611   WBC Thousand/uL 9 22   HEMOGLOBIN g/dL 14 2   HEMATOCRIT % 40 9   PLATELETS Thousands/uL 191   NEUTROS PCT % 64   LYMPHS PCT % 24   MONOS PCT % 10   EOS PCT % 1     Results from last 7 days   Lab Units 07/08/21  1611   SODIUM mmol/L 135*   POTASSIUM mmol/L 4 3   CHLORIDE mmol/L 102   CO2 mmol/L 24   BUN mg/dL 45*   CREATININE mg/dL 2 35*   ANION GAP mmol/L 9   CALCIUM mg/dL 9 0   GLUCOSE RANDOM mg/dL 108                       Imaging: Reviewed radiology reports from this admission including: CT head  XR chest 1 view portable   ED Interpretation by Reina Spangler DO (07/08 1633)   No acute cardiopulmonary disease noted on chest x-ray  CT head without contrast   Final Result by Ahmet Kaur MD (07/08 1642)      No acute intracranial abnormality  Workstation performed: [id number]             EKG and Other Studies Reviewed on Admission:   · EKG: Sinus bradycardia with first-degree AV block with frequent premature ventricular complexes       ** Please Note: This note has been constructed using a voice recognition system   **

## 2021-07-08 NOTE — ASSESSMENT & PLAN NOTE
Lab Results   Component Value Date    HGBA1C 6 1 04/06/2021       No results for input(s): POCGLU in the last 72 hours      Blood Sugar Average: Last 72 hrs:   controlled by diet  Monitor fingerstick blood glucose  Follow hypoglycemia precaution

## 2021-07-09 PROBLEM — R00.1 BRADYCARDIA: Status: ACTIVE | Noted: 2021-07-09

## 2021-07-09 LAB
ALBUMIN SERPL BCP-MCNC: 3.1 G/DL (ref 3.5–5)
ALP SERPL-CCNC: 45 U/L (ref 46–116)
ALT SERPL W P-5'-P-CCNC: 26 U/L (ref 12–78)
ANION GAP SERPL CALCULATED.3IONS-SCNC: 9 MMOL/L (ref 4–13)
AST SERPL W P-5'-P-CCNC: 19 U/L (ref 5–45)
BILIRUB SERPL-MCNC: 0.51 MG/DL (ref 0.2–1)
BILIRUB UR QL STRIP: NEGATIVE
BUN SERPL-MCNC: 35 MG/DL (ref 5–25)
CALCIUM ALBUM COR SERPL-MCNC: 9.2 MG/DL (ref 8.3–10.1)
CALCIUM SERPL-MCNC: 8.5 MG/DL (ref 8.3–10.1)
CHLORIDE SERPL-SCNC: 106 MMOL/L (ref 100–108)
CLARITY UR: CLEAR
CO2 SERPL-SCNC: 24 MMOL/L (ref 21–32)
COLOR UR: YELLOW
CREAT SERPL-MCNC: 1.67 MG/DL (ref 0.6–1.3)
GFR SERPL CREATININE-BSD FRML MDRD: 39 ML/MIN/1.73SQ M
GLUCOSE P FAST SERPL-MCNC: 125 MG/DL (ref 65–99)
GLUCOSE SERPL-MCNC: 103 MG/DL (ref 65–140)
GLUCOSE SERPL-MCNC: 111 MG/DL (ref 65–140)
GLUCOSE SERPL-MCNC: 112 MG/DL (ref 65–140)
GLUCOSE SERPL-MCNC: 125 MG/DL (ref 65–140)
GLUCOSE SERPL-MCNC: 94 MG/DL (ref 65–140)
GLUCOSE UR STRIP-MCNC: NEGATIVE MG/DL
HGB UR QL STRIP.AUTO: NEGATIVE
KETONES UR STRIP-MCNC: NEGATIVE MG/DL
LEUKOCYTE ESTERASE UR QL STRIP: NEGATIVE
NITRITE UR QL STRIP: NEGATIVE
PH UR STRIP.AUTO: 5.5 [PH]
POTASSIUM SERPL-SCNC: 4.3 MMOL/L (ref 3.5–5.3)
PROT SERPL-MCNC: 6.4 G/DL (ref 6.4–8.2)
PROT UR STRIP-MCNC: NEGATIVE MG/DL
SODIUM SERPL-SCNC: 139 MMOL/L (ref 136–145)
SP GR UR STRIP.AUTO: 1.02 (ref 1–1.03)
UROBILINOGEN UR QL STRIP.AUTO: 0.2 E.U./DL

## 2021-07-09 PROCEDURE — 99232 SBSQ HOSP IP/OBS MODERATE 35: CPT | Performed by: FAMILY MEDICINE

## 2021-07-09 PROCEDURE — 82948 REAGENT STRIP/BLOOD GLUCOSE: CPT

## 2021-07-09 PROCEDURE — 80053 COMPREHEN METABOLIC PANEL: CPT | Performed by: FAMILY MEDICINE

## 2021-07-09 PROCEDURE — 81003 URINALYSIS AUTO W/O SCOPE: CPT | Performed by: FAMILY MEDICINE

## 2021-07-09 RX ADMIN — LEVOTHYROXINE SODIUM 25 MCG: 25 TABLET ORAL at 07:44

## 2021-07-09 RX ADMIN — SODIUM CHLORIDE 125 ML/HR: 0.9 INJECTION, SOLUTION INTRAVENOUS at 21:50

## 2021-07-09 RX ADMIN — SODIUM CHLORIDE 125 ML/HR: 0.9 INJECTION, SOLUTION INTRAVENOUS at 10:44

## 2021-07-09 RX ADMIN — POLYETHYLENE GLYCOL 3350 17 G: 17 POWDER, FOR SOLUTION ORAL at 07:45

## 2021-07-09 RX ADMIN — ENOXAPARIN SODIUM 30 MG: 30 INJECTION SUBCUTANEOUS at 07:45

## 2021-07-09 RX ADMIN — ASPIRIN 324 MG: 81 TABLET, COATED ORAL at 07:44

## 2021-07-09 RX ADMIN — CLOPIDOGREL BISULFATE 75 MG: 75 TABLET ORAL at 07:45

## 2021-07-09 RX ADMIN — AMLODIPINE BESYLATE 5 MG: 5 TABLET ORAL at 07:44

## 2021-07-09 RX ADMIN — ATORVASTATIN CALCIUM 40 MG: 40 TABLET, FILM COATED ORAL at 16:14

## 2021-07-09 NOTE — ASSESSMENT & PLAN NOTE
Unknown etiology-complicated with multiple fall as per family member  Patient does have significant cardiac history-troponin negative, initial EKG is sinus bradycardia with AV block  NIH is 0  No significant orthostatic hypotension noted  Continue neuro checks  As per telemetry, patient was running bradycardia  But in the meantime patient's symptoms improved    With stress ( ambulation)-patient's heart rate increased to lower 60s  Follow-up PT/OT recommendation

## 2021-07-09 NOTE — ASSESSMENT & PLAN NOTE
As per telemetry  With ambulation, patient's heart rate remains lower 60s  Patient is not on any calcium channel blocker her beta-blocker-remained asymptomatic during ambulation  Patient follows up with his own cardiologist, recommends to follow up with cardiologist for further recommendation

## 2021-07-09 NOTE — ASSESSMENT & PLAN NOTE
Patient does have history of CABG  Complaining dizziness as well as multiple fall  Troponin negative  Telemetry as per telemetry, patient was having bradycardia, remained asymptomatic, no pause noted    Telemetry discontinue

## 2021-07-09 NOTE — PLAN OF CARE
Problem: Potential for Falls  Goal: Patient will remain free of falls  Description: INTERVENTIONS:  - Educate patient/family on patient safety including physical limitations  - Instruct patient to call for assistance with activity   - Consult OT/PT to assist with strengthening/mobility   - Keep Call bell within reach  - Keep bed low and locked with side rails adjusted as appropriate  - Keep care items and personal belongings within reach  - Initiate and maintain comfort rounds  - Make Fall Risk Sign visible to staff  - Offer Toileting every 2 Hours, in advance of need  - Initiate/Maintain bed/chair alarm  - Obtain necessary fall risk management equipment:   - Apply yellow socks and bracelet for high fall risk patients  - Consider moving patient to room near nurses station  Outcome: Progressing     Problem: MUSCULOSKELETAL - ADULT  Goal: Maintain or return mobility to safest level of function  Description: INTERVENTIONS:  - Assess patient's ability to carry out ADLs; assess patient's baseline for ADL function and identify physical deficits which impact ability to perform ADLs (bathing, care of mouth/teeth, toileting, grooming, dressing, etc )  - Assess/evaluate cause of self-care deficits   - Assess range of motion  - Assess patient's mobility  - Assess patient's need for assistive devices and provide as appropriate  - Encourage maximum independence but intervene and supervise when necessary  - Involve family in performance of ADLs  - Assess for home care needs following discharge   - Consider OT consult to assist with ADL evaluation and planning for discharge  - Provide patient education as appropriate  Outcome: Progressing  Goal: Maintain proper alignment of affected body part  Description: INTERVENTIONS:  - Support, maintain and protect limb and body alignment  - Provide patient/ family with appropriate education  Outcome: Progressing     Problem: SAFETY ADULT  Goal: Patient will remain free of falls  Description: INTERVENTIONS:  - Educate patient/family on patient safety including physical limitations  - Instruct patient to call for assistance with activity   - Consult OT/PT to assist with strengthening/mobility   - Keep Call bell within reach  - Keep bed low and locked with side rails adjusted as appropriate  - Keep care items and personal belongings within reach  - Initiate and maintain comfort rounds  - Make Fall Risk Sign visible to staff  - Offer Toileting every 2 Hours, in advance of need  - Initiate/Maintain bed/chair alarm  - Obtain necessary fall risk management equipment:   - Apply yellow socks and bracelet for high fall risk patients  - Consider moving patient to room near nurses station  Outcome: Progressing  Goal: Maintain or return to baseline ADL function  Description: INTERVENTIONS:  -  Assess patient's ability to carry out ADLs; assess patient's baseline for ADL function and identify physical deficits which impact ability to perform ADLs (bathing, care of mouth/teeth, toileting, grooming, dressing, etc )  - Assess/evaluate cause of self-care deficits   - Assess range of motion  - Assess patient's mobility; develop plan if impaired  - Assess patient's need for assistive devices and provide as appropriate  - Encourage maximum independence but intervene and supervise when necessary  - Involve family in performance of ADLs  - Assess for home care needs following discharge   - Consider OT consult to assist with ADL evaluation and planning for discharge  - Provide patient education as appropriate  Outcome: Progressing  Goal: Maintains/Returns to pre admission functional level  Description: INTERVENTIONS:  - Perform BMAT or MOVE assessment daily    - Set and communicate daily mobility goal to care team and patient/family/caregiver  - Collaborate with rehabilitation services on mobility goals if consulted  - Perform Range of Motion 3times a day  - Reposition patient every 2 hours    - Dangle patient 3 times a day  - Stand patient 3 times a day  - Ambulate patient 3 times a day  - Out of bed to chair 3 times a day   - Out of bed for meals 3 times a day  - Out of bed for toileting  - Record patient progress and toleration of activity level   Outcome: Progressing     Problem: DISCHARGE PLANNING  Goal: Discharge to home or other facility with appropriate resources  Description: INTERVENTIONS:  - Identify barriers to discharge w/patient and caregiver  - Arrange for needed discharge resources and transportation as appropriate  - Identify discharge learning needs (meds, wound care, etc )  - Arrange for interpretive services to assist at discharge as needed  - Refer to Case Management Department for coordinating discharge planning if the patient needs post-hospital services based on physician/advanced practitioner order or complex needs related to functional status, cognitive ability, or social support system  Outcome: Progressing     Problem: Knowledge Deficit  Goal: Patient/family/caregiver demonstrates understanding of disease process, treatment plan, medications, and discharge instructions  Description: Complete learning assessment and assess knowledge base    Interventions:  - Provide teaching at level of understanding  - Provide teaching via preferred learning methods  Outcome: Progressing     Problem: MOBILITY - ADULT  Goal: Maintain or return to baseline ADL function  Description: INTERVENTIONS:  -  Assess patient's ability to carry out ADLs; assess patient's baseline for ADL function and identify physical deficits which impact ability to perform ADLs (bathing, care of mouth/teeth, toileting, grooming, dressing, etc )  - Assess/evaluate cause of self-care deficits   - Assess range of motion  - Assess patient's mobility; develop plan if impaired  - Assess patient's need for assistive devices and provide as appropriate  - Encourage maximum independence but intervene and supervise when necessary  - Involve family in performance of ADLs  - Assess for home care needs following discharge   - Consider OT consult to assist with ADL evaluation and planning for discharge  - Provide patient education as appropriate  Outcome: Progressing  Goal: Maintains/Returns to pre admission functional level  Description: INTERVENTIONS:  - Perform BMAT or MOVE assessment daily    - Set and communicate daily mobility goal to care team and patient/family/caregiver  - Collaborate with rehabilitation services on mobility goals if consulted  - Perform Range of Motion 3times a day  - Reposition patient every 2 hours    - Dangle patient 3 times a day  - Stand patient 3 times a day  - Ambulate patient 3 times a day  - Out of bed to chair 3 times a day   - Out of bed for meals 3 times a day  - Out of bed for toileting  - Record patient progress and toleration of activity level   Outcome: Progressing

## 2021-07-09 NOTE — ASSESSMENT & PLAN NOTE
Could be medication side effect as well as dehydration  Hold patient's home blood pressure medication  Baseline creatinine is 1 18  Creatinine is trending down, today creatinine is 1 67    Will continue hydration

## 2021-07-09 NOTE — ASSESSMENT & PLAN NOTE
Lab Results   Component Value Date    HGBA1C 6 1 04/06/2021       Recent Labs     07/08/21  2107 07/09/21  0740 07/09/21  1138   POCGLU 167* 103 112       Blood Sugar Average: Last 72 hrs:  (P) 164 [id number] controlled by diet  Monitor fingerstick blood glucose  Follow hypoglycemia precaution

## 2021-07-09 NOTE — CASE MANAGEMENT
CM called pt multiple times via room phone to complete assessment  Pt answers the phone but unable to hear CM  CM called his home phone at [phone] to speak with his wife, Alta Schaefer listed as emergency contact but did not reach her  CM dept will continue to follow

## 2021-07-09 NOTE — OCCUPATIONAL THERAPY NOTE
Katy     Patient Name: Mely Alonso  KFZMZ'E Date: 7/9/2021  Problem List  Principal Problem:    RIYA (acute kidney injury) [facility])  Active Problems:    Type 2 diabetes mellitus without complication, without long-term current use of insulin ([facility] [address] )    Coronary artery disease involving coronary bypass graft of native heart without angina pectoris    Acquired hypothyroidism    Dizzinesses    Abrasion       07/09/21 0900   Note Type   Note type Screen     OT orders received  Chart review completed  Patient admitted to [facility] on 7/8/2021 with Dx: acute kidney injury  Spoke with patient who reports he is at his PLOF of independent  Completing functional mobility and ADL tasks at independent  Pt verbalized no OT concerns upon returning home  Pt appears to be at prior level of functioning at this time and does not require acute OT services  D/C OT effective 7/9/2021  If new concerns arise, please re-consult      HARDIK Molina/L

## 2021-07-09 NOTE — PHYSICAL THERAPY NOTE
Physical Therapy Screen    Patient Name: Christy Tate    OVYIJ'B Date: 7/9/2021     Problem List  Principal Problem:    RIYA (acute kidney injury) ([facility] [address] )  Active Problems:    Type 2 diabetes mellitus without complication, without long-term current use of insulin ([facility] [address] )    Coronary artery disease involving coronary bypass graft of native heart without angina pectoris    Acquired hypothyroidism    Dizzinesses    Abrasion       Past Medical History  Past Medical History:   Diagnosis Date    Chronic ischemic heart disease     Coronary artery disease     hx cabg x4 2007    Hearing loss     Hyperlipidemia     Hypertension     Hypokalemia     Hypothyroid     OA (osteoarthritis)     left knee    Prediabetes     S/P AVR     2007    Type 2 diabetes mellitus ([facility] [address] )     Wears dentures     full upper    Wears glasses         Past Surgical History  Past Surgical History:   Procedure Laterality Date    AORTIC VALVE REPLACEMENT      BACK SURGERY      lumbar     CATARACT EXTRACTION Bilateral     CORONARY ARTERY BYPASS GRAFT      x4    2007    KNEE ARTHROSCOPY Bilateral     NC TOTAL KNEE ARTHROPLASTY Left 1/17/2018    Procedure: ARTHROPLASTY KNEE TOTAL;  Surgeon: Kandice Clarke MD;  Location: [facility];  Service: Orthopedics    SHOULDER ARTHROSCOPY Right            07/09/21 0905   PT Last Visit   PT Visit Date 07/09/21   Note Type   Note type Screen       Received order for PT consult  Chart reviewed  Spoke with patient who reports he is at his PLOF of independent, ambulating in and out of bathroom and in room prn managing IV pole  Pt observed completing bed mobility, transfers and ambulating in room without difficulty at an independent level  Pt reports having no concerns with returning home at this time  Should patient's status change, please re-consult  Will D/C PT services at this time as pt is at his PLOF without acute care PT warranted      Marta Valles, PT,DPT

## 2021-07-09 NOTE — PROGRESS NOTES
[address]  Progress Note - Jimmie Morales 1940, 68 y o  male MRN: [id number]  Unit/Bed#: -Natasha Encounter: [id number]  Primary Care Provider: Loree Goodman MD   Date and time admitted to hospital: 7/8/2021  3:46 PM    * RIYA (acute kidney injury) [facility])  Assessment & Plan  Could be medication side effect as well as dehydration  Hold patient's home blood pressure medication  Baseline creatinine is 1 18  Creatinine is trending down, today creatinine is 1 67  Will continue hydration      Bradycardia  Assessment & Plan  As per telemetry  With ambulation, patient's heart rate remains lower 60s  Patient is not on any calcium channel blocker her beta-blocker-remained asymptomatic during ambulation  Patient follows up with his own cardiologist, recommends to follow up with cardiologist for further recommendation    Abrasion  Assessment & Plan  Located the base of left finger  Clean in nature  Provide Tdap  Maintain wound care    [address]  Unknown etiology-complicated with multiple fall as per family member  Patient does have significant cardiac history-troponin negative, initial EKG is sinus bradycardia with AV block  NIH is 0  No significant orthostatic hypotension noted  Continue neuro checks  As per telemetry, patient was running bradycardia  But in the meantime patient's symptoms improved  With stress ( ambulation)-patient's heart rate increased to lower 60s  Follow-up PT/OT recommendation    Acquired hypothyroidism  Assessment & Plan  Continue thyroid medication  TSH elevated, T4 normal    Coronary artery disease involving coronary bypass graft of native heart without angina pectoris  Assessment & Plan  Patient does have history of CABG  Complaining dizziness as well as multiple fall  Troponin negative  Telemetry as per telemetry, patient was having bradycardia, remained asymptomatic, no pause noted    Telemetry discontinue    Type 2 diabetes mellitus without complication, without long-term current use of insulin [facility])  Assessment & Plan  Lab Results   Component Value Date    HGBA1C 6 1 2021       Recent Labs     21  2107 21  0740 21  1138   POCGLU 167* 103 112       Blood Sugar Average: Last 72 hrs:  (P) 517 [id number] controlled by diet  Monitor fingerstick blood glucose  Follow hypoglycemia precaution        VTE Pharmacologic Prophylaxis: VTE Score: 4 Moderate Risk (Score 3-4) - Pharmacological DVT Prophylaxis Ordered: enoxaparin (Lovenox)  Patient Centered Rounds: I performed bedside rounds with nursing staff today  Education and Discussions with Family / Patient: Updated  (Family member) at bedside  Time Spent for Care: 15 minutes  More than 50% of total time spent on counseling and coordination of care as described above  Current Length of Stay: 0 day(s)  Current Patient Status: Inpatient   Certification Statement: The patient will continue to require additional inpatient hospital stay due to RIYA, bradycardia  Discharge Plan: Anticipate discharge in 24-48 hrs to home  Code Status: Level 1 - Full Code    Subjective:   Seen and evaluated during the round  Patient reports he is feeling much better  Denies any nausea, vomiting, diarrhea, dizziness, lightheadedness  Objective:     Vitals:   Temp (24hrs), Av 6 °F (36 4 °C), Min:97 °F (36 1 °C), Max:97 8 °F (36 6 °C)    Temp:  [97 °F (36 1 °C)-97 8 °F (36 6 °C)] 97 8 °F (36 6 °C)  HR:  [] 48  Resp:  [14-20] 15  BP: (100-126)/(60-80) 115/73  SpO2:  [94 %-98 %] 96 %  Body mass index is 30 2 kg/m²  Input and Output Summary (last 24 hours): Intake/Output Summary (Last 24 hours) at 2021 1435  Last data filed at 2021 1044  Gross per 24 hour   Intake 1240 ml   Output 1075 ml   Net 165 ml       Physical Exam:   Physical Exam  Vitals and nursing note reviewed  Constitutional:       Appearance: He is not diaphoretic     HENT: Head: Normocephalic  Nose: No congestion  Mouth/Throat:      Mouth: Mucous membranes are moist       Pharynx: No oropharyngeal exudate  Eyes:      General: No scleral icterus  Conjunctiva/sclera: Conjunctivae normal       Pupils: Pupils are equal, round, and reactive to light  Cardiovascular:      Rate and Rhythm: Normal rate  Heart sounds: No murmur heard  No friction rub  No gallop  Pulmonary:      Effort: Pulmonary effort is normal  No respiratory distress  Breath sounds: No stridor  No wheezing or rhonchi  Abdominal:      General: Abdomen is flat  Bowel sounds are normal  There is no distension  Palpations: There is no mass  Tenderness: There is no abdominal tenderness  Hernia: No hernia is present  Musculoskeletal:         General: No swelling or tenderness  Normal range of motion  Cervical back: Normal range of motion  Lymphadenopathy:      Cervical: No cervical adenopathy  Skin:     General: Skin is warm  Capillary Refill: Capillary refill takes less than 2 seconds  Coloration: Skin is not jaundiced  Findings: No bruising  Neurological:      General: No focal deficit present  Mental Status: He is alert and oriented to person, place, and time  Cranial Nerves: No cranial nerve deficit     Psychiatric:         Mood and Affect: Mood normal          Additional Data:     Labs:  Results from last 7 days   Lab Units 07/08/21  1611   WBC Thousand/uL 9 22   HEMOGLOBIN g/dL 14 2   HEMATOCRIT % 40 9   PLATELETS Thousands/uL 191   NEUTROS PCT % 64   LYMPHS PCT % 24   MONOS PCT % 10   EOS PCT % 1     Results from last 7 days   Lab Units 07/09/21  0445   SODIUM mmol/L 139   POTASSIUM mmol/L 4 3   CHLORIDE mmol/L 106   CO2 mmol/L 24   BUN mg/dL 35*   CREATININE mg/dL 1 67*   ANION GAP mmol/L 9   CALCIUM mg/dL 8 5   ALBUMIN g/dL 3 1*   TOTAL BILIRUBIN mg/dL 0 51   ALK PHOS U/L 45*   ALT U/L 26   AST U/L 19   GLUCOSE RANDOM mg/dL 125 Results from last 7 days   Lab Units 07/09/21  1138 07/09/21  0740 07/08/21  2107   POC GLUCOSE mg/dl 112 103 167*               Lines/Drains:  Invasive Devices     Peripheral Intravenous Line            Peripheral IV 07/08/21 Left;Ventral (anterior) Forearm <1 day                      Imaging: No pertinent imaging reviewed  Recent Cultures (last 7 days):         Last 24 Hours Medication List:   Current Facility-Administered Medications   Medication Dose Route Frequency Provider Last Rate    acetaminophen  650 mg Oral Q6H PRN Kimberly Jennings MD      amLODIPine  5 mg Oral Daily Kimberly Jennings MD      aspirin  324 mg Oral Daily Kimberly Jennings MD      atorvastatin  40 mg Oral Daily With Kerrie Cao MD      clopidogrel  75 mg Oral Daily Kimberly Jennings MD      enoxaparin  30 mg Subcutaneous Daily Kimberly Jennings MD      levothyroxine  25 mcg Oral Daily Kimberly Jennings MD      meclizine  12 5 mg Oral Q8H PRN Kimberly Jennings MD      ondansetron  4 mg Intravenous Q6H PRN Kimberly Jennings MD      polyethylene glycol  17 g Oral Daily Kimberly Jennings MD      sodium chloride  125 mL/hr Intravenous Continuous Kimberly Jennings  mL/hr (07/09/21 1044)        Today, Patient Was Seen By: Kimberly Jennings MD    **Please Note: This note may have been constructed using a voice recognition system  **

## 2021-07-09 NOTE — PLAN OF CARE
Problem: MUSCULOSKELETAL - ADULT  Goal: Maintain or return mobility to safest level of function  Description: INTERVENTIONS:  - Assess patient's ability to carry out ADLs; assess patient's baseline for ADL function and identify physical deficits which impact ability to perform ADLs (bathing, care of mouth/teeth, toileting, grooming, dressing, etc )  - Assess/evaluate cause of self-care deficits   - Assess range of motion  - Assess patient's mobility  - Assess patient's need for assistive devices and provide as appropriate  - Encourage maximum independence but intervene and supervise when necessary  - Involve family in performance of ADLs  - Assess for home care needs following discharge   - Consider OT consult to assist with ADL evaluation and planning for discharge  - Provide patient education as appropriate  Outcome: Progressing     Problem: SAFETY ADULT  Goal: Patient will remain free of falls  Description: INTERVENTIONS:  - Educate patient/family on patient safety including physical limitations  - Instruct patient to call for assistance with activity   - Consult OT/PT to assist with strengthening/mobility   - Keep Call bell within reach  - Keep bed low and locked with side rails adjusted as appropriate  - Keep care items and personal belongings within reach  - Initiate and maintain comfort rounds  - Make Fall Risk Sign visible to staff  - Offer Toileting every 2 Hours, in advance of need  - Initiate/Maintain bed/chair alarm  - Obtain necessary fall risk management equipment:   - Apply yellow socks and bracelet for high fall risk patients  - Consider moving patient to room near nurses station  Outcome: Progressing     Problem: DISCHARGE PLANNING  Goal: Discharge to home or other facility with appropriate resources  Description: INTERVENTIONS:  - Identify barriers to discharge w/patient and caregiver  - Arrange for needed discharge resources and transportation as appropriate  - Identify discharge learning needs (meds, wound care, etc )  - Arrange for interpretive services to assist at discharge as needed  - Refer to Case Management Department for coordinating discharge planning if the patient needs post-hospital services based on physician/advanced practitioner order or complex needs related to functional status, cognitive ability, or social support system  Outcome: Progressing

## 2021-07-10 VITALS
OXYGEN SATURATION: 95 % | WEIGHT: 166.2 LBS | HEIGHT: 62 IN | TEMPERATURE: 98 F | HEART RATE: 52 BPM | RESPIRATION RATE: 16 BRPM | DIASTOLIC BLOOD PRESSURE: 75 MMHG | BODY MASS INDEX: 30.59 KG/M2 | SYSTOLIC BLOOD PRESSURE: 116 MMHG

## 2021-07-10 LAB
ANION GAP SERPL CALCULATED.3IONS-SCNC: 8 MMOL/L (ref 4–13)
BUN SERPL-MCNC: 22 MG/DL (ref 5–25)
CALCIUM SERPL-MCNC: 8.2 MG/DL (ref 8.3–10.1)
CHLORIDE SERPL-SCNC: 106 MMOL/L (ref 100–108)
CO2 SERPL-SCNC: 22 MMOL/L (ref 21–32)
CREAT SERPL-MCNC: 1.25 MG/DL (ref 0.6–1.3)
GFR SERPL CREATININE-BSD FRML MDRD: 55 ML/MIN/1.73SQ M
GLUCOSE SERPL-MCNC: 115 MG/DL (ref 65–140)
GLUCOSE SERPL-MCNC: 123 MG/DL (ref 65–140)
POTASSIUM SERPL-SCNC: 4 MMOL/L (ref 3.5–5.3)
SODIUM SERPL-SCNC: 136 MMOL/L (ref 136–145)

## 2021-07-10 PROCEDURE — 99239 HOSP IP/OBS DSCHRG MGMT >30: CPT | Performed by: FAMILY MEDICINE

## 2021-07-10 PROCEDURE — 80048 BASIC METABOLIC PNL TOTAL CA: CPT | Performed by: FAMILY MEDICINE

## 2021-07-10 PROCEDURE — 82948 REAGENT STRIP/BLOOD GLUCOSE: CPT

## 2021-07-10 RX ORDER — AMLODIPINE BESYLATE 5 MG/1
5 TABLET ORAL DAILY
Qty: 30 TABLET | Refills: 0 | Status: SHIPPED | OUTPATIENT
Start: 2021-07-10 | End: 2021-12-28 | Stop reason: SDUPTHER

## 2021-07-10 RX ADMIN — ENOXAPARIN SODIUM 30 MG: 30 INJECTION SUBCUTANEOUS at 07:46

## 2021-07-10 RX ADMIN — POLYETHYLENE GLYCOL 3350 17 G: 17 POWDER, FOR SOLUTION ORAL at 07:47

## 2021-07-10 RX ADMIN — LEVOTHYROXINE SODIUM 25 MCG: 25 TABLET ORAL at 07:46

## 2021-07-10 RX ADMIN — AMLODIPINE BESYLATE 5 MG: 5 TABLET ORAL at 07:46

## 2021-07-10 RX ADMIN — CLOPIDOGREL BISULFATE 75 MG: 75 TABLET ORAL at 07:46

## 2021-07-10 RX ADMIN — SODIUM CHLORIDE 125 ML/HR: 0.9 INJECTION, SOLUTION INTRAVENOUS at 05:30

## 2021-07-10 RX ADMIN — ASPIRIN 324 MG: 81 TABLET, COATED ORAL at 07:45

## 2021-07-10 NOTE — ASSESSMENT & PLAN NOTE
Patient does have history of CABG  Complaining dizziness as well as multiple fall  Troponin negative   as per telemetry, patient was having bradycardia, remained asymptomatic, no pause noted    Telemetry discontinue

## 2021-07-10 NOTE — DISCHARGE SUMMARY
[address]  Discharge- Nathan Novel 1940, 68 y o  male MRN: [id number]  Unit/Bed#: -Natasha Encounter: [id number]  Primary Care Provider: Saqib Mac MD   Date and time admitted to hospital: 7/8/2021  3:46 PM    * RIYA (acute kidney injury) [facility])  Assessment & Plan  Could be medication side effect as well as dehydration  Hold patient's home blood pressure medication  Baseline creatinine is 1 18  Condition resolved      Bradycardia  Assessment & Plan  As per telemetry  With ambulation, patient's heart rate remains lower 60s  Patient is not on any calcium channel blocker her beta-blocker-remained asymptomatic during ambulation  Patient follows up with his own cardiologist, recommends to follow up with cardiologist for further recommendation    Abrasion  Assessment & Plan  Located the base of left finger  Clean in nature  Provide Tdap  Maintain wound care    Dizzinesses  Assessment & Plan  Unknown etiology-complicated with multiple fall as per family member  Patient does have significant cardiac history-troponin negative, initial EKG is sinus bradycardia with AV block  NIH is 0  No significant orthostatic hypotension noted  Continue neuro checks  As per telemetry, patient was running bradycardia  But in the meantime patient's symptoms improved  With stress ( ambulation)-patient's heart rate increased to lower 60s  PT/OT recommendation appreciated    Acquired hypothyroidism  Assessment & Plan  Continue thyroid medication  TSH elevated, T4 normal    Coronary artery disease involving coronary bypass graft of native heart without angina pectoris  Assessment & Plan  Patient does have history of CABG  Complaining dizziness as well as multiple fall  Troponin negative   as per telemetry, patient was having bradycardia, remained asymptomatic, no pause noted    Telemetry discontinue    Type 2 diabetes mellitus without complication, without long-term current use of insulin [facility])  Assessment & Plan  Lab Results   Component Value Date    HGBA1C 6 1 04/06/2021       Recent Labs     07/09/21  1138 07/09/21  1550 07/09/21  2046 07/10/21  0735   POCGLU 112 94 111 115       Blood Sugar Average: Last 72 hrs:  (P) 117 controlled by diet  Monitor fingerstick blood glucose  Follow hypoglycemia precaution        Medical Problems     Resolved Problems  Date Reviewed: 4/6/2021    None              Discharging Physician / Practitioner: Chata King MD  PCP: Bubba Roche MD  Admission Date:   Admission Orders (From admission, onward)     Ordered        07/09/21 1435  Inpatient Admission  Once         07/08/21 1704  Place in Observation  Once                   Discharge Date: 07/10/21    Consultations During Hospital Stay:  · none    Procedures Performed:   XR chest 1 view portable   ED Interpretation by Elliott Matos DO (07/08 1633)   No acute cardiopulmonary disease noted on chest x-ray  Final Result by Ravindra Bernard MD (07/09 3145)      No acute cardiopulmonary disease  Workstation performed: [id number]         CT head without contrast   Final Result by Jose R Grace MD (07/08 1642)      No acute intracranial abnormality                    Workstation performed: [id number]         ·   ·     Significant Findings / Test Results:   Lab Results   Component Value Date    WBC 9 22 07/08/2021    HGB 14 2 07/08/2021    HCT 40 9 07/08/2021    MCV 87 07/08/2021     07/08/2021   ·   Lab Results   Component Value Date    SODIUM 136 07/10/2021    K 4 0 07/10/2021     07/10/2021    CO2 22 07/10/2021    AGAP 8 07/10/2021    BUN 22 07/10/2021    CREATININE 1 25 07/10/2021    GLUC 123 07/10/2021    GLUF 125 (H) 07/09/2021    CALCIUM 8 2 (L) 07/10/2021    AST 19 07/09/2021    ALT 26 07/09/2021    ALKPHOS 45 (L) 07/09/2021    TP 6 4 07/09/2021    TBILI 0 51 07/09/2021    EGFR 55 07/10/2021   ·   ·     Incidental Findings:   · Bradycardia     Test Results Pending at Discharge (will require follow up): · None     Outpatient Tests Requested:  · None    Complications:  None    Reason for Admission:  Dizziness    Hospital Course:   Tim Ware is a 68 y o  male patient who originally presented to the hospital on 7/8/2021 due to dizziness, most likely secondary to dehydration  Patient also found RIYA most likely secondary to dehydration and medication side effect  Patient admitted under the diagnosis of RIYA, start receiving IV hydration, blood pressure medication remain on hold, in the meantime used amlodipine to control his blood pressure  With the treatment, patient's condition significantly improved  Since patient has history of dizziness, patient placed on telemetry, was found bradycardia, but patient's symptoms improved  No post noted  With ambulation, patient's heart rate improves with lower 60s  Patient advised to follow-up with his own cardiologist for further management  Due to RIYA, patient's lisinopril/hydrochlorothiazide medication discontinued  Added amlodipine  Patient can resume all other medication and follow-up with PCP, Cardiology Neurology as scheduled  Patient also evaluated by PT/OT  Clear by the PT OT to discharge home  Patient also had abrasion on the left thumb area, received tetanus  Advised the patient to maintain personal hygiene and keep the area clean and dry  Please see above list of diagnoses and related plan for additional information  Condition at Discharge: stable    Discharge Day Visit / Exam:   Subjective:  Seen and evaluated during the round  Patient reports his feeling much better  Denies any significant complaint  Wants to go home to attend family function      Vitals: Blood Pressure: 116/75 (07/09/21 2132)  Pulse: (!) 52 (07/09/21 2132)  Temperature: 98 °F (36 7 °C) (07/09/21 2132)  Temp Source: Oral (07/09/21 2132)  Respirations: 16 (07/09/21 2132)  Height: 5' 2" (157 5 cm) (07/08/21 1758)  Weight - Scale: 75 4 kg (166 lb 3 2 oz) (07/10/21 0600)  SpO2: 95 % (07/09/21 2132)  Exam:   Physical Exam  Vitals and nursing note reviewed  Constitutional:       Appearance: He is not diaphoretic  HENT:      Head: Normocephalic  Nose: Nose normal  No congestion  Mouth/Throat:      Mouth: Mucous membranes are moist       Pharynx: No oropharyngeal exudate  Eyes:      General: No scleral icterus  Pupils: Pupils are equal, round, and reactive to light  Cardiovascular:      Rate and Rhythm: Bradycardia present  Heart sounds: No gallop  Pulmonary:      Effort: Pulmonary effort is normal  No respiratory distress  Breath sounds: No stridor  No wheezing, rhonchi or rales  Chest:      Chest wall: No tenderness  Abdominal:      General: Abdomen is flat  Bowel sounds are normal  There is no distension  Palpations: There is no mass  Tenderness: There is no abdominal tenderness  Hernia: No hernia is present  Musculoskeletal:         General: Normal range of motion  Cervical back: Normal range of motion  Right lower leg: No edema  Left lower leg: No edema  Lymphadenopathy:      Cervical: No cervical adenopathy  Skin:     General: Skin is warm  Capillary Refill: Capillary refill takes less than 2 seconds  Neurological:      General: No focal deficit present  Mental Status: He is alert and oriented to person, place, and time  Cranial Nerves: No cranial nerve deficit  Sensory: No sensory deficit  Motor: No weakness  Coordination: Coordination normal       Gait: Gait normal    Psychiatric:         Mood and Affect: Mood normal          Discussion with Family: Updated  (wife) at bedside  Discharge instructions/Information to patient and family:   See after visit summary for information provided to patient and family        Provisions for Follow-Up Care:  See after visit summary for information related to follow-up care and any pertinent home health orders  Disposition:   Home    Planned Readmission:  If condition get worse     Discharge Statement:  I spent >35 minutes discharging the patient  This time was spent on the day of discharge  I had direct contact with the patient on the day of discharge  Greater than 50% of the total time was spent examining patient, answering all patient questions, arranging and discussing plan of care with patient as well as directly providing post-discharge instructions  Additional time then spent on discharge activities  Discharge Medications:  See after visit summary for reconciled discharge medications provided to patient and/or family        **Please Note: This note may have been constructed using a voice recognition system**

## 2021-07-10 NOTE — ASSESSMENT & PLAN NOTE
Unknown etiology-complicated with multiple fall as per family member  Patient does have significant cardiac history-troponin negative, initial EKG is sinus bradycardia with AV block  NIH is 0  No significant orthostatic hypotension noted  Continue neuro checks  As per telemetry, patient was running bradycardia  But in the meantime patient's symptoms improved    With stress ( ambulation)-patient's heart rate increased to lower 60s  PT/OT recommendation appreciated

## 2021-07-10 NOTE — DISCHARGE INSTRUCTIONS
Acute Kidney Injury   AMBULATORY CARE:   Acute kidney injury (RIYA) is also called acute kidney failure, or acute renal failure  RIYA happens when your kidneys suddenly stop working correctly  Normally, the kidneys remove fluid, chemicals, and waste from your blood  These wastes are turned into urine by your kidneys  RIYA usually happens over hours or days  When you have RIYA, your kidneys do not remove the waste, chemicals, or extra fluid from your body  A normal amount of urine is not produced  RIYA is usually temporary, but it may become a chronic kidney condition  Causes of RIYA:   · Decreased blood flow to the kidney, such as from hypercalcemia (high blood calcium level) or severe heart disease     · A disease or condition that affects the kidneys, such as hypertension (high blood pressure) or diabetes     · A blockage in the kidney or ureter, such as a kidney or bladder stone, enlarged prostate, or tumor    Common symptoms include the following: You may not have any symptoms with early or mild RIYA  As RIYA progresses, you may have any of the following:  · Decrease in the amount of urine or no urination    · Swelling in your arms, legs, or feet     · Weakness, drowsiness, or no appetite    · Nausea, flank pain, muscle twitching or muscle cramps    · Itchy skin, or your, breath or body smells like urine    · Behavior changes, confusion, disorientation, or seizures    Call 911 if:   · You have sudden chest pain or trouble breathing  Seek care immediately if:   · Your symptoms get worse  Contact your healthcare provider if:   · Your symptoms return  · Your blood sugar or blood pressure level is not within the range your healthcare provider recommends  · You have questions or concerns about your condition or care  Treatment for RIYA  depends upon the cause of your acute kidney injury and how severe it is   Usually, RIYA will be monitored in the hospital  If you have mild RIYA, you may be able to go home to recover  Your healthcare providers will treat the cause of your RIYA  You may need IV fluids if your RIYA was caused by little or no fluid in your body  You may need dialysis to remove waste and extra fluid from your body  Nutrition:  Your healthcare provider may tell you to eat food low in sodium (salt), potassium, phosphorus, or protein  A dietitian can help you plan your meals  Drink liquids as directed: Your healthcare provider may recommend that you drink a certain amount of liquids  This will help your kidneys work better and decrease your risk for dehydration  Ask how much liquid to drink each day and which liquids are best for you  What you can do to manage and prevent RIYA:   · Monitor and manage other health conditions  such as diabetes, high blood pressure, or heart disease  These conditions increase your risk for acute kidney injury  Take your medicines for these conditions as directed  Also, monitor your blood sugar and blood pressure levels as directed  Contact your healthcare provider if your levels are not in the range he or she says it should be  · Talk to your healthcare provider before you take over-the-counter-medicine  NSAIDs, stomach medicine, or laxatives may harm your kidneys and increase your risk for acute kidney injury  If it is okay to take the medicine, follow the directions on the package  Do not take more than directed  · Tell healthcare providers you have had acute kidney injury  before you get contrast liquid for an x-ray or CT scan  Your healthcare provider may give you medicine to prevent kidney problems caused by the liquid  Follow up with your healthcare provider as directed:  Write down your questions so you remember to ask them during your visits  © Copyright [address] Information is for End User's use only and may not be sold, redistributed or otherwise used for commercial purposes   All illustrations and images included in CareNotes® are the copyrighted property of A D A M , Inc  or [facility] Chrissy Reyes   The above information is an  only  It is not intended as medical advice for individual conditions or treatments  Talk to your doctor, nurse or pharmacist before following any medical regimen to see if it is safe and effective for you

## 2021-07-10 NOTE — ASSESSMENT & PLAN NOTE
Lab Results   Component Value Date    HGBA1C 6 1 04/06/2021       Recent Labs     07/09/21  1138 07/09/21  1550 07/09/21  2046 07/10/21  0735   POCGLU 112 94 111 115       Blood Sugar Average: Last 72 hrs:  (P) 117 controlled by diet  Monitor fingerstick blood glucose  Follow hypoglycemia precaution

## 2021-07-10 NOTE — ASSESSMENT & PLAN NOTE
Could be medication side effect as well as dehydration  Hold patient's home blood pressure medication  Baseline creatinine is 1 18  Condition resolved

## 2021-07-12 ENCOUNTER — OFFICE VISIT (OUTPATIENT)
Dept: FAMILY MEDICINE CLINIC | Facility: CLINIC | Age: 81
End: 2021-07-12
Payer: MEDICARE

## 2021-07-12 ENCOUNTER — TRANSITIONAL CARE MANAGEMENT (OUTPATIENT)
Dept: FAMILY MEDICINE CLINIC | Facility: CLINIC | Age: 81
End: 2021-07-12

## 2021-07-12 ENCOUNTER — TELEPHONE (OUTPATIENT)
Dept: FAMILY MEDICINE CLINIC | Facility: CLINIC | Age: 81
End: 2021-07-12

## 2021-07-12 VITALS
HEIGHT: 62 IN | DIASTOLIC BLOOD PRESSURE: 76 MMHG | BODY MASS INDEX: 30.55 KG/M2 | WEIGHT: 166 LBS | SYSTOLIC BLOOD PRESSURE: 124 MMHG | HEART RATE: 60 BPM | OXYGEN SATURATION: 96 %

## 2021-07-12 DIAGNOSIS — I25.810 CORONARY ARTERY DISEASE INVOLVING CORONARY BYPASS GRAFT OF NATIVE HEART WITHOUT ANGINA PECTORIS: Chronic | ICD-10-CM

## 2021-07-12 DIAGNOSIS — E03.9 ACQUIRED HYPOTHYROIDISM: Chronic | ICD-10-CM

## 2021-07-12 DIAGNOSIS — E11.9 TYPE 2 DIABETES MELLITUS WITHOUT COMPLICATION, WITHOUT LONG-TERM CURRENT USE OF INSULIN (HCC): Chronic | ICD-10-CM

## 2021-07-12 DIAGNOSIS — T14.8XXA ABRASION: ICD-10-CM

## 2021-07-12 DIAGNOSIS — N17.9 AKI (ACUTE KIDNEY INJURY) (HCC): ICD-10-CM

## 2021-07-12 DIAGNOSIS — S69.92XA INJURY OF LEFT HAND, INITIAL ENCOUNTER: Primary | ICD-10-CM

## 2021-07-12 DIAGNOSIS — I10 ESSENTIAL HYPERTENSION: Chronic | ICD-10-CM

## 2021-07-12 DIAGNOSIS — R00.1 BRADYCARDIA: ICD-10-CM

## 2021-07-12 LAB
ATRIAL RATE: 58 BPM
P AXIS: 61 DEGREES
PR INTERVAL: 218 MS
QRS AXIS: 51 DEGREES
QRSD INTERVAL: 110 MS
QT INTERVAL: 426 MS
QTC INTERVAL: 485 MS
T WAVE AXIS: 260 DEGREES
VENTRICULAR RATE: 78 BPM

## 2021-07-12 PROCEDURE — 99496 TRANSJ CARE MGMT HIGH F2F 7D: CPT | Performed by: FAMILY MEDICINE

## 2021-07-12 PROCEDURE — 93010 ELECTROCARDIOGRAM REPORT: CPT | Performed by: INTERNAL MEDICINE

## 2021-07-12 NOTE — PROGRESS NOTES
Assessment/Plan:     RIYA (acute kidney injury) [facility])   Reviewed hospital reports and reconciled medication  I am very suspicious that was dehydrated due to excessive heat exposure  The kidney function was back in the upper normal range at time of discharge  Be sure to push fluids and avoid any extensive he had exposure at this time    Bradycardia   Heart rates have been very low  Although seems to be tolerating this okay at this time  Will do Holter monitor and is due for follow-up with Cardiology  I feel is going to need pacemaker    Essential hypertension   Pressure looks stable today despite decrease in medication  Will continue current regimen at this time    Acquired hypothyroidism   Overall stable, continue current regimen    Coronary artery disease involving coronary bypass graft of native heart without angina pectoris   Appears stable although does have the problem with bradycardia  Continue current regimen and follow-up  Would limit activity until seen by Cardiology    Type 2 diabetes mellitus without complication, without long-term current use of insulin ([address] )   Levels remained good and is not on any medication  Watch sweets in diet  Lab Results   Component Value Date    HGBA1C 6 1 04/06/2021       Abrasion   Should clean area daily and use Silvadene  Will probably take about 7-10 days to fully close       Diagnoses and all orders for this visit:    Injury of left hand, initial encounter  -     silver sulfadiazine (SILVADENE,SSD) 1 % cream; Apply topically daily    Bradycardia  -     Holter monitor - 24 hour; Future    RIYA (acute kidney injury) ([address] )    Essential hypertension    Acquired hypothyroidism    Coronary artery disease involving coronary bypass graft of native heart without angina pectoris    Type 2 diabetes mellitus without complication, without long-term current use of insulin (HCC)    Abrasion         Subjective:     Patient ID: Andrea Trejo is a 80 y o  male      Patient has history of hypertension, hypothyroidism, coronary artery disease, diabetes mellitus adult and degenerative arthritis  Seen today for transition of care post hospitalization for acute kidney injury  Showed bradycardia in the hospital and blood pressure was low  The lisinopril HCTZ was discontinued  Did also have laceration between the thumb and 2nd finger of the left hand which he has been leaving open and cleaning  With review patient and not been feeling well for about 3 or 4 days prior to the hospitalization and had been outside in the heat  Actually is feeling better at this time and is not having the lightheaded episodes      Review of Systems   Constitutional: Positive for activity change ( has beenLess active since the hospitalization)  Negative for appetite change, chills, fatigue, fever and unexpected weight change  HENT: Negative for congestion, rhinorrhea and trouble swallowing  Eyes: Negative for visual disturbance  Respiratory: Negative for apnea, cough, chest tightness and shortness of breath  Cardiovascular: Negative for chest pain, palpitations and leg swelling  Gastrointestinal: Negative for abdominal distention, abdominal pain, constipation and diarrhea  Endocrine: Negative for polyuria ( nocturia x1)  Genitourinary: Negative for difficulty urinating and enuresis  Musculoskeletal: Positive for arthralgias and back pain ( this is better with less activity)  Negative for myalgias  Skin: Negative for rash  Allergic/Immunologic: Negative for environmental allergies  Neurological: Positive for dizziness and light-headedness  Negative for weakness, numbness and headaches  Hematological: Negative for adenopathy  Does not bruise/bleed easily  Psychiatric/Behavioral: Negative for agitation, confusion and sleep disturbance  Objective:     Physical Exam  Vitals and nursing note reviewed  Constitutional:       Appearance: Normal appearance  He is well-developed  HENT:      Head: Normocephalic  Nose: Nose normal       Mouth/Throat:      Mouth: Mucous membranes are moist    Eyes:      Pupils: Pupils are equal, round, and reactive to light  Neck:      Thyroid: No thyromegaly  Vascular: No carotid bruit  Cardiovascular:      Rate and Rhythm: Normal rate and regular rhythm  Heart sounds: Normal heart sounds  No murmur ( rate is 42 soft systolic murmur at left sternal border) heard  Pulmonary:      Effort: Pulmonary effort is normal       Breath sounds: Normal breath sounds  Abdominal:      General: Abdomen is flat  There is no distension  Palpations: Abdomen is soft  There is no mass  Tenderness: There is no abdominal tenderness  Musculoskeletal:         General: Normal range of motion  Cervical back: Normal range of motion and neck supple  Right lower leg: No edema  Left lower leg: No edema  Lymphadenopathy:      Cervical: No cervical adenopathy  Skin:     General: Skin is warm and dry  Findings: No rash  Neurological:      Mental Status: He is alert and oriented to person, place, and time  Motor: No weakness  Coordination: Coordination normal       Gait: Gait normal       Deep Tendon Reflexes: Reflexes normal    Psychiatric:         Mood and Affect: Mood normal          Behavior: Behavior normal          Thought Content: Thought content normal          Judgment: Judgment normal            Vitals:    07/12/21 1501   BP: 124/76   BP Location: Right arm   Patient Position: Sitting   Cuff Size: Standard   Pulse: 60   SpO2: 96%   Weight: 75 3 kg (166 lb)   Height: 5' 2" (1 575 m)       Transitional Care Management Review:  Andrea Trejo is a 80 y o  male here for TCM follow up       During the TCM phone call patient stated:    TCM Call (since 6/12/2021)     Date and time call was made  7/12/2021  8:57 AM    Hospital care reviewed  Records reviewed    Patient was hospitialized at  [facility]        Date of Admission  07/08/21    Date of discharge  07/10/21    Diagnosis  acute kidney injury    Disposition  Home    Were the patients medications reviewed and updated  No    Current Symptoms  None      TCM Call (since 6/12/2021)     Post hospital issues  None    Should patient be enrolled in anticoag monitoring? No    Scheduled for follow up?   Yes    Did you obtain your prescribed medications  Yes    Do you need help managing your prescriptions or medications  No    Is transportation to your appointment needed  No    I have advised the patient to call PCP with any new or worsening symptoms  Romel Saunders CMA    Living Arrangements  Spouse or Significiant other    Support System  Spouse    The type of support provided  Financial; Emotional    Do you have social support  Yes, as much as I need    Are you recieving any outpatient services  No    Are you recieving home care services  No    Are you using any community resources  No    Current waiver services  No    Have you fallen in the last 12 months  No    Interperter language line needed  No    Counseling  Patient          Juan Carey MD

## 2021-07-12 NOTE — ASSESSMENT & PLAN NOTE
Reviewed hospital reports and reconciled medication  I am very suspicious that was dehydrated due to excessive heat exposure  The kidney function was back in the upper normal range at time of discharge    Be sure to push fluids and avoid any extensive he had exposure at this time

## 2021-07-12 NOTE — PATIENT INSTRUCTIONS
Reviewed hospital reports and reconciled medication  Should use the Silvadene on the laceration of the left hand after cleaning daily and cover with bandage  Because of the significant bradycardia will get Holter monitor but I feel is probably going to need pacemaker    Be sure to keep the fluids up and avoid any excessive heat exposure continue other meds as is

## 2021-07-12 NOTE — PROGRESS NOTES
TCM Call (since 6/11/2021)     Date and time call was made  7/12/2021  8:57 AM    Hospital care reviewed  Records reviewed    Patient was hospitialized at  [address]        Date of Admission  07/08/21    Date of discharge  07/10/21    Diagnosis  acute kidney injury    Disposition  Home    Were the patients medications reviewed and updated  No    Current Symptoms  None      TCM Call (since 6/11/2021)     Post hospital issues  None    Should patient be enrolled in anticoag monitoring? No    Scheduled for follow up?   Yes    Did you obtain your prescribed medications  Yes    Do you need help managing your prescriptions or medications  No    Is transportation to your appointment needed  No    I have advised the patient to call PCP with any new or worsening symptoms  [facility] [address] or Significiant other    Support System  Spouse    The type of support provided  Financial; Emotional    Do you have social support  Yes, as much as I need    Are you recieving any outpatient services  No    Are you recieving home care services  No    Are you using any community resources  No    Current waiver services  No    Have you fallen in the last 12 months  No    Interperter language line needed  No    Counseling  Patient

## 2021-07-12 NOTE — TELEPHONE ENCOUNTER
Phone call from [facility] at discharge patient was advised to have holter done(notes in chart) Cardiologist in [place] won't order  Wants you to  He has appt today at 3  Please address

## 2021-07-13 NOTE — ASSESSMENT & PLAN NOTE
Pressure looks stable today despite decrease in medication    Will continue current regimen at this time

## 2021-07-13 NOTE — ASSESSMENT & PLAN NOTE
Appears stable although does have the problem with bradycardia  Continue current regimen and follow-up    Would limit activity until seen by Cardiology

## 2021-07-13 NOTE — ASSESSMENT & PLAN NOTE
Heart rates have been very low  Although seems to be tolerating this okay at this time  Will do Holter monitor and is due for follow-up with Cardiology    I feel is going to need pacemaker

## 2021-07-13 NOTE — ASSESSMENT & PLAN NOTE
Levels remained good and is not on any medication    Watch sweets in diet  Lab Results   Component Value Date    HGBA1C 6 1 04/06/2021

## 2021-07-14 NOTE — PHYSICIAN ADVISOR
Current patient class: Inpatient  The patient is currently on Hospital Day: 3 at [place] [address]      This patient was originally admitted to the hospital under observation class  After admission the patient was reevaluated and determined to require further hospitalization  The patient was then documented to require at least a 2nd midnight in the hospital  As such the patient was then expected to satisfy the 2 midnight benchmark and was therefore eligible for inpatient admission  After review of the relevant documentation, labs, vital signs and test results, the patient is appropriate for INPATIENT ADMISSION  Admission to the hospital as an inpatient is a complex decision making process which requires the practitioner to consider the patients presenting complaint, history and physical examination and all relevant testing  With this in mind, in this case, the patient was deemed appropriate for INPATIENT ADMISSION  After review of the documentation and testing available at the time of the admission I concur with this clinical determination of medical necessity  Rationale is as follows:     the patient is a 21-year-old male who presented with dizziness and feeling unwell  He was placed under observation class  His primary diagnosis was acute kidney injury and his creatinine was significantly elevated from baseline  The following day his creatinine improved but was not near baseline so hydration was continued with intravenous fluids  He did also have bradycardia and this was investigated  Given his need for continued hospitalization and management, he was converted to inpatient class which was appropriate      The patients vitals on arrival were   ED Triage Vitals   Temperature Pulse Respirations Blood Pressure SpO2   07/08/21 1602 07/08/21 1558 07/08/21 1558 07/08/21 1558 07/08/21 1558   (!) 97 °F (36 1 °C) 69 18 118/80 97 %      Temp Source Heart Rate Source Patient Position - Orthostatic VS BP Location FiO2 (%)   07/08/21 1602 07/08/21 1558 07/08/21 1558 07/08/21 1558 --   Temporal Monitor Lying Right arm       Pain Score       07/08/21 1747       No Pain           Past Medical History:   Diagnosis Date    Chronic ischemic heart disease     Coronary artery disease     hx cabg x4 2007    Hearing loss     Hyperlipidemia     Hypertension     Hypokalemia     Hypothyroid     OA (osteoarthritis)     left knee    Prediabetes     S/P AVR     2007    Type 2 diabetes mellitus ([address] )     Wears dentures     full upper    Wears glasses      Past Surgical History:   Procedure Laterality Date    AORTIC VALVE REPLACEMENT      BACK SURGERY      lumbar     CATARACT EXTRACTION Bilateral     CORONARY ARTERY BYPASS GRAFT      x4    2007    KNEE ARTHROSCOPY Bilateral     AK TOTAL KNEE ARTHROPLASTY Left 1/17/2018    Procedure: ARTHROPLASTY KNEE TOTAL;  Surgeon: Jane Sweet MD;  Location: [facility];  Service: Orthopedics    SHOULDER ARTHROSCOPY Right        The patient was admitted to the hospital at  2:35 PM on 7/9/21 for the following diagnosis:  Dizziness [R42]  Lightheadedness [R42]  PVC's (premature ventricular contractions) [I49 3]  Acute kidney injury ([address] ) [N17 9]    Consults have been placed to:   IP CONSULT TO CASE MANAGEMENT    Vitals:    07/09/21 1029 07/09/21 1444 07/09/21 2132 07/10/21 0600   BP: 115/73 116/75 116/75    BP Location:  Right arm Right arm    Pulse: (!) 48 (!) 46 (!) 52    Resp: 15 16 16    Temp: 97 8 °F (36 6 °C) (!) 97 4 °F (36 3 °C) 98 °F (36 7 °C)    TempSrc:  Oral Oral    SpO2: 96% 97% 95%    Weight:    75 4 kg (166 lb 3 2 oz)   Height:           Most recent labs:    No results for input(s): WBC, HGB, HCT, PLT, K, NA, CALCIUM, BUN, CREATININE, LIPASE, AMYLASE, INR, TROPONINI, CKTOTAL, AST, ALT, ALKPHOS, BILITOT in the last 72 hours  Scheduled Meds:  Continuous Infusions:No current facility-administered medications for this encounter      PRN Meds:     Surgical procedures (if appropriate):

## 2021-07-19 ENCOUNTER — HOSPITAL ENCOUNTER (OUTPATIENT)
Dept: NON INVASIVE DIAGNOSTICS | Facility: HOSPITAL | Age: 81
Discharge: HOME | End: 2021-07-19
Attending: FAMILY MEDICINE
Payer: MEDICARE

## 2021-07-19 DIAGNOSIS — R00.1 BRADYCARDIA: ICD-10-CM

## 2021-07-19 PROCEDURE — 93226 XTRNL ECG REC<48 HR SCAN A/R: CPT

## 2021-07-19 PROCEDURE — 93225 XTRNL ECG REC<48 HRS REC: CPT

## 2021-07-26 PROCEDURE — 93227 XTRNL ECG REC<48 HR R&I: CPT | Performed by: INTERNAL MEDICINE

## 2021-07-30 ENCOUNTER — OFFICE VISIT (OUTPATIENT)
Dept: FAMILY MEDICINE CLINIC | Facility: CLINIC | Age: 81
End: 2021-07-30
Payer: MEDICARE

## 2021-07-30 VITALS
OXYGEN SATURATION: 96 % | DIASTOLIC BLOOD PRESSURE: 80 MMHG | WEIGHT: 168 LBS | BODY MASS INDEX: 30.91 KG/M2 | SYSTOLIC BLOOD PRESSURE: 118 MMHG | HEIGHT: 62 IN | HEART RATE: 52 BPM

## 2021-07-30 DIAGNOSIS — I10 ESSENTIAL HYPERTENSION: Chronic | ICD-10-CM

## 2021-07-30 DIAGNOSIS — I25.810 CORONARY ARTERY DISEASE INVOLVING CORONARY BYPASS GRAFT OF NATIVE HEART WITHOUT ANGINA PECTORIS: Chronic | ICD-10-CM

## 2021-07-30 DIAGNOSIS — E11.9 TYPE 2 DIABETES MELLITUS WITHOUT COMPLICATION, WITHOUT LONG-TERM CURRENT USE OF INSULIN (HCC): ICD-10-CM

## 2021-07-30 DIAGNOSIS — M15.9 PRIMARY OSTEOARTHRITIS INVOLVING MULTIPLE JOINTS: Chronic | ICD-10-CM

## 2021-07-30 DIAGNOSIS — E03.9 ACQUIRED HYPOTHYROIDISM: Chronic | ICD-10-CM

## 2021-07-30 DIAGNOSIS — R00.1 BRADYCARDIA: Primary | ICD-10-CM

## 2021-07-30 PROBLEM — T14.8XXA ABRASION: Status: RESOLVED | Noted: 2021-07-08 | Resolved: 2021-07-30

## 2021-07-30 PROBLEM — R11.0 NAUSEA: Status: RESOLVED | Noted: 2020-12-15 | Resolved: 2021-07-30

## 2021-07-30 LAB
CREAT UR-MCNC: 229 MG/DL
MICROALBUMIN UR-MCNC: 33.3 MG/L (ref 0–20)
MICROALBUMIN/CREAT 24H UR: 15 MG/G CREATININE (ref 0–30)
SL AMB POCT HEMOGLOBIN AIC: 6.2 (ref ?–6.5)

## 2021-07-30 PROCEDURE — 82570 ASSAY OF URINE CREATININE: CPT | Performed by: FAMILY MEDICINE

## 2021-07-30 PROCEDURE — 99214 OFFICE O/P EST MOD 30 MIN: CPT | Performed by: FAMILY MEDICINE

## 2021-07-30 PROCEDURE — 82043 UR ALBUMIN QUANTITATIVE: CPT | Performed by: FAMILY MEDICINE

## 2021-07-30 PROCEDURE — G0439 PPPS, SUBSEQ VISIT: HCPCS | Performed by: FAMILY MEDICINE

## 2021-07-30 PROCEDURE — 83036 HEMOGLOBIN GLYCOSYLATED A1C: CPT | Performed by: FAMILY MEDICINE

## 2021-07-30 NOTE — RESULT ENCOUNTER NOTE
I discussed the results with the patient today  This Holter monitor  was requested to be done by his cardiologist in [facility] Dr Milly James    Please send the copy of the report to him

## 2021-07-30 NOTE — PATIENT INSTRUCTIONS
Overall patient is very functional   Seems to be doing better with the back pain  Will continue all meds as is and push activity as tolerated  Discussed the Holter monitor which does show some low heart rates at 40   I felt that the cardiologist was going to deal with this but apparently this is not been done and will need to send the report to him

## 2021-07-30 NOTE — ASSESSMENT & PLAN NOTE
Showing good control with diet    Continue to watch diet and push activity  Lab Results   Component Value Date    HGBA1C 6 2 07/30/2021

## 2021-07-30 NOTE — PROGRESS NOTES
Tejas Gupta is here for his Subsequent Wellness visit  Last Medicare Wellness visit information reviewed, patient interviewed and updates made to the record today  Health Risk Assessment:   Patient rates overall health as good  Patient feels that their physical health rating is same  Patient is satisfied with their life  Eyesight was rated as same  Hearing was rated as slightly worse  Patient feels that their emotional and mental health rating is same  Patients states they are sometimes angry  Patient states they are sometimes unusually tired/fatigued  Pain experienced in the last 7 days has been some  Patient's pain rating has been 3/10  Patient states that he has experienced no weight loss or gain in last 6 months  Continues to have his intermittent back and knee pain although this is much better    Fall Risk Screening: In the past year, patient has experienced: history of falling in past year    Number of falls: 2 or more  Injured during fall?: Yes    Feels unsteady when standing or walking?: No    Worried about falling?: Yes      Home Safety:  Patient does not have trouble with stairs inside or outside of their home  Patient has working smoke alarms and has no working carbon monoxide detector  Home safety hazards include: none  I did advise a carbon monoxide monitor in the house and grab bars in the bathroom    Nutrition:   Current diet is Regular and Limited junk food  Medications:   Patient is not currently taking any over-the-counter supplements  Patient is able to manage medications  No issues    Activities of Daily Living (ADLs)/Instrumental Activities of Daily Living (IADLs):   Walk and transfer into and out of bed and chair?: Yes  Dress and groom yourself?: Yes    Bathe or shower yourself?: Yes    Feed yourself?  Yes  Do your laundry/housekeeping?: Yes  Manage your money, pay your bills and track your expenses?: Yes  Make your own meals?: Yes    Do your own shopping?: Yes    ADL comments: Overall patient is very functional at home  No issues    Previous Hospitalizations:   Any hospitalizations or ED visits within the last 12 months?: Yes    How many hospitalizations have you had in the last year?: 1-2    Hospitalization Comments:  Patient [facility] for cardiology    Advance Care Planning:   Living will: Yes    Durable POA for healthcare: No    Advanced directive: Yes    Five wishes given: Yes      Comments:  No issues  Should bring in to office to have scanned in the chart    Cognitive Screening:   Provider or family/friend/caregiver concerned regarding cognition?: No    PREVENTIVE SCREENINGS      Cardiovascular Screening:    General: Screening Not Indicated and History Lipid Disorder      Diabetes Screening:     General: Screening Not Indicated and History Diabetes      Colorectal Cancer Screening:     General: Screening Not Indicated      Prostate Cancer Screening:    General: Screening Not Indicated      Osteoporosis Screening:    General: Screening Not Indicated      Abdominal Aortic Aneurysm (AAA) Screening:        General: Screening Not Indicated      Lung Cancer Screening:     General: Screening Not Indicated      Hepatitis C Screening:    General: Screening Not Indicated      Preventive Screening Comments:  Had followed up with routine eye care and should have this report  Forwarded to office    Screening, Brief Intervention, and Referral to Treatment (SBIRT)    Screening  Typical number of drinks in a day: 0  Typical number of drinks in a week: 0  Interpretation: Low risk drinking behavior  Single Item Drug Screening:  How often have you used an illegal drug (including marijuana) or a prescription medication for non-medical reasons in the past year? never    Single Item Drug Screen Score: 0  Interpretation: Negative screen for possible drug use disorder    Brief Intervention  Alcohol & drug use screenings were reviewed   No concerns regarding substance use disorder identified

## 2021-07-30 NOTE — PROGRESS NOTES
Assessment/Plan:    Bradycardia   Discussed the  Holter monitor which did show bradycardia at the 40 range  Patient is asymptomatic with this  Needs to check back with cardiologist and will fax the results of the Holter down to the cardiologist    Acquired hypothyroidism   Overall stable, continue current regimen    Coronary artery disease involving coronary bypass graft of native heart without angina pectoris   Appears stable, continue current regimen and follow-up  Push activity as tolerated    Essential hypertension   Doing well, continue current regimen    Primary osteoarthritis involving multiple joints   Seems to be doing better  Use p r n  meds and push activity as tolerated    Type 2 diabetes mellitus without complication, without long-term current use of insulin (HCC)   Showing good control with diet  Continue to watch diet and push activity  Lab Results   Component Value Date    HGBA1C 6 2 07/30/2021        Diagnoses and all orders for this visit:    Bradycardia    Type 2 diabetes mellitus without complication, without long-term current use of insulin (HCC)  -     Microalbumin / creatinine urine ratio ([facility]); Future  -     POCT hemoglobin A1c  -     Microalbumin / creatinine urine ratio ([facility])    Acquired hypothyroidism    Coronary artery disease involving coronary bypass graft of native heart without angina pectoris    Essential hypertension    Primary osteoarthritis involving multiple joints    Other orders  -     Cancel: Hepatitis C Antibody ([facility]); Future  -     Cancel: Ambulatory Referral to Ophthalmology; Future          Subjective:      Patient ID: Benji Pack is a 80 y o  male  Patient has history of hypertension, hypothyroidism, coronary artery disease, diabetes mellitus adult and degenerative arthritis    Continues to have his intermittent problems with low back pain and left knee pain although  Both of these problems seem better since the hospitalization  Had been showing trouble with bradycardia and had Holter monitor which showed rates between 40 and about 105  Had not followed up with Cardiology about this but is not been having any problems with lightheaded  feeling      The following portions of the patient's history were reviewed and updated as appropriate: allergies, current medications, past medical history, past social history, past surgical history and problem list     Review of Systems   Constitutional: Negative for activity change, appetite change, chills, fatigue, fever and unexpected weight change  HENT: Negative for congestion, dental problem ( has upper dentures), hearing loss, rhinorrhea, trouble swallowing and voice change  Eyes: Negative for visual disturbance  Respiratory: Negative for apnea, cough, chest tightness and shortness of breath  Cardiovascular: Negative for chest pain, palpitations and leg swelling  Gastrointestinal: Negative for abdominal distention, abdominal pain, constipation and diarrhea  Endocrine: Negative for polyuria ( nocturia x1)  Genitourinary: Negative for difficulty urinating and enuresis  Musculoskeletal: Positive for arthralgias ( although these are better) and back pain  Negative for myalgias  Skin: Negative for rash  Allergic/Immunologic: Negative for environmental allergies  Neurological: Negative for dizziness, weakness, light-headedness, numbness and headaches  Hematological: Negative for adenopathy  Psychiatric/Behavioral: Negative for agitation and sleep disturbance  Objective:      /80 (BP Location: Left arm, Patient Position: Sitting, Cuff Size: Standard)   Pulse (!) 52   Ht 5' 2" (1 575 m)   Wt 76 2 kg (168 lb)   SpO2 96%   BMI 30 73 kg/m²          Physical Exam  Vitals and nursing note reviewed  Constitutional:       Appearance: Normal appearance  He is well-developed  HENT:      Head: Normocephalic        Right Ear: Tympanic membrane, ear canal and external ear normal  Decreased hearing ( 25 decibelHearing screen off except at 2000 hertz  No difficulty with conversation) noted  Left Ear: Tympanic membrane, ear canal and external ear normal  Decreased hearing ( 25 decibelHearing screen off except at 500 hertz) noted  Nose: Nose normal       Mouth/Throat:      Mouth: Mucous membranes are moist       Dentition: Abnormal dentition ( multiple missing lower teeth)  Has dentures ( upper)  Eyes:      Extraocular Movements: Extraocular movements intact  Conjunctiva/sclera: Conjunctivae normal       Pupils: Pupils are equal, round, and reactive to light  Neck:      Thyroid: No thyromegaly  Vascular: No carotid bruit  Cardiovascular:      Rate and Rhythm: Normal rate and regular rhythm  Pulses: no weak pulses          Dorsalis pedis pulses are 2+ on the right side and 1+ on the left side  Posterior tibial pulses are 2+ on the right side and 1+ on the left side  Heart sounds: Murmur ( early systolic murmur at left sternal border  Heart rate is 54) heard  Pulmonary:      Effort: Pulmonary effort is normal       Breath sounds: Normal breath sounds  Abdominal:      General: Abdomen is flat  There is no distension  Palpations: Abdomen is soft  There is no mass  Tenderness: There is no abdominal tenderness  Musculoskeletal:         General: Normal range of motion  Cervical back: Normal range of motion and neck supple  No tenderness  Left lower leg: No edema  Right foot: Deformity ( multiple hammertoesAnd bunion at the base of the great toe that is nontender) present  Left foot: Deformity ( multiple hammertoes) present  Feet:    Feet:      Right foot:      Protective Sensation: 8 sites tested  8 sites sensed  Skin integrity: Skin integrity normal       Left foot:      Protective Sensation: 8 sites tested  8 sites sensed        Skin integrity: Skin integrity normal  Lymphadenopathy:      Cervical: No cervical adenopathy  Skin:     General: Skin is warm and dry  Capillary Refill: Capillary refill takes 2 to 3 seconds  Findings: No rash  Neurological:      General: No focal deficit present  Mental Status: He is alert and oriented to person, place, and time  Cranial Nerves: No cranial nerve deficit  Sensory: No sensory deficit  Motor: No weakness  Coordination: Coordination normal       Gait: Gait normal       Deep Tendon Reflexes: Reflexes abnormal    Psychiatric:         Mood and Affect: Mood normal          Behavior: Behavior normal          Thought Content: Thought content normal          Judgment: Judgment normal        Diabetic Foot Exam    Patient's shoes and socks removed  Right Foot/Ankle   Right Foot Inspection    Toe Exam: right toe deformity ( multiple hammertoes and bunion at the base of the great toe  The bunion is nontender)  Sensory       Monofilament testing: intact  Vascular  Capillary refills: < 3 seconds  The right DP pulse is 2+  The right PT pulse is 2+  Left Foot/Ankle  Left Foot Inspection                           Toe Exam: left toe deformity ( multiple hammertoes)                   Sensory       Monofilament: intact  Vascular  Capillary refills: < 3 seconds  The left DP pulse is 1+  The left PT pulse is 1+  Assign Risk Category:  Deformity present; No loss of protective sensation;  No weak pulses       Risk: 1

## 2021-07-30 NOTE — ASSESSMENT & PLAN NOTE
Discussed the  Holter monitor which did show bradycardia at the 40 range  Patient is asymptomatic with this    Needs to check back with cardiologist and will fax the results of the Holter down to the cardiologist

## 2021-08-11 ENCOUNTER — HOSPITAL ENCOUNTER (EMERGENCY)
Facility: HOSPITAL | Age: 81
Discharge: HOME | End: 2021-08-11
Attending: STUDENT IN AN ORGANIZED HEALTH CARE EDUCATION/TRAINING PROGRAM
Payer: MEDICARE

## 2021-08-11 VITALS
OXYGEN SATURATION: 98 % | SYSTOLIC BLOOD PRESSURE: 169 MMHG | BODY MASS INDEX: 30.36 KG/M2 | HEIGHT: 62 IN | WEIGHT: 165 LBS | HEART RATE: 66 BPM | TEMPERATURE: 97.7 F | DIASTOLIC BLOOD PRESSURE: 83 MMHG | RESPIRATION RATE: 19 BRPM

## 2021-08-11 DIAGNOSIS — S61.219A FINGER LACERATION: Primary | ICD-10-CM

## 2021-08-11 PROCEDURE — 99284 EMERGENCY DEPT VISIT MOD MDM: CPT | Performed by: PHYSICIAN ASSISTANT

## 2021-08-11 PROCEDURE — 99282 EMERGENCY DEPT VISIT SF MDM: CPT

## 2021-08-11 RX ORDER — BACITRACIN, NEOMYCIN, POLYMYXIN B 400; 3.5; 5 [USP'U]/G; MG/G; [USP'U]/G
1 OINTMENT TOPICAL ONCE
Status: COMPLETED | OUTPATIENT
Start: 2021-08-11 | End: 2021-08-11

## 2021-08-11 RX ORDER — CEPHALEXIN 500 MG/1
500 CAPSULE ORAL EVERY 6 HOURS SCHEDULED
Qty: 12 CAPSULE | Refills: 0 | Status: SHIPPED | OUTPATIENT
Start: 2021-08-11 | End: 2021-08-14

## 2021-08-11 RX ORDER — CEPHALEXIN 250 MG/1
500 CAPSULE ORAL ONCE
Status: COMPLETED | OUTPATIENT
Start: 2021-08-11 | End: 2021-08-11

## 2021-08-11 RX ADMIN — CEPHALEXIN 500 MG: 250 CAPSULE ORAL at 10:34

## 2021-08-11 RX ADMIN — BACITRACIN, NEOMYCIN, POLYMYXIN B 1 SMALL APPLICATION: 400; 3.5; 5 OINTMENT TOPICAL at 10:34

## 2021-08-11 NOTE — ED PROVIDER NOTES
History  Chief Complaint   Patient presents with    Finger Laceration     Pt reports having car radiator fall out on R ring finger, cutting it  Up to date on tetanus  80year old male presents emergency department for evaluation of laceration to the right ring finger  Patient states yesterday he was working the car when the radiator fell and caused a cut to his finger  States he thoroughly irrigated this with water and use peroxide  Has been using a cream at home  Bleeding was controlled with pressure  Patient is on Plavix  States he presented today as he was concerned wound might become infected  Reports tetanus is up-to-date  He denies any fevers or chills  Denies any pus drainage  History provided by:  Patient  Finger Laceration  Location:  Finger  Finger laceration location:  R ring finger  Length:  1  Quality: jagged    Bleeding: controlled with pressure    Time since incident:  1 day  Laceration mechanism:  Metal edge  Pain details:     Quality:  Throbbing    Severity:  No pain    Progression:  Resolved  Foreign body present:  No foreign bodies  Tetanus status:  Up to date  Associated symptoms: no fever, no focal weakness, no numbness, no rash, no redness, no swelling and no streaking        Prior to Admission Medications   Prescriptions Last Dose Informant Patient Reported? Taking?    Multiple Vitamin (MULTIVITAMIN) capsule  Self Yes No   Sig: Take 1 capsule by mouth daily   Omega-3 Fatty Acids (FISH OIL) 1200 MG CAPS   Yes No   Sig: Take 4 each by mouth daily    amLODIPine (NORVASC) 5 mg tablet   No No   Sig: Take 1 tablet (5 mg total) by mouth daily   aspirin (ECOTRIN LOW STRENGTH) 81 mg EC tablet   Yes No   Sig: Take 324 mg by mouth daily    clopidogrel (PLAVIX) 75 mg tablet   Yes No   Sig: Take 75 mg by mouth daily   levothyroxine 25 mcg tablet   No No   Sig: Take 1 tablet by mouth once daily   silver sulfadiazine (SILVADENE,SSD) 1 % cream   No No   Sig: Apply topically daily simvastatin (ZOCOR) 40 mg tablet   No No   Sig: TAKE 1 TABLET BY MOUTH ONCE DAILY AT BEDTIME   Patient taking differently: Take by mouth daily       Facility-Administered Medications: None       Past Medical History:   Diagnosis Date    Chronic ischemic heart disease     Coronary artery disease     hx cabg x4 2007    Hearing loss     Hyperlipidemia     Hypertension     Hypokalemia     Hypothyroid     OA (osteoarthritis)     left knee    Prediabetes     S/P AVR     2007    Type 2 diabetes mellitus ([address] )     Wears dentures     full upper    Wears glasses        Past Surgical History:   Procedure Laterality Date    AORTIC VALVE REPLACEMENT      BACK SURGERY      lumbar     CATARACT EXTRACTION Bilateral     CORONARY ARTERY BYPASS GRAFT      x4    2007    KNEE ARTHROSCOPY Bilateral     [place] TOTAL KNEE ARTHROPLASTY Left 1/17/2018    Procedure: ARTHROPLASTY KNEE TOTAL;  Surgeon: Eliane Nelson MD;  Location: [facility];  Service: Orthopedics    SHOULDER ARTHROSCOPY Right        Family History   Problem Relation Age of Onset    Cancer Mother      I have reviewed and agree with the history as documented  E-Cigarette/Vaping    E-Cigarette Use Never User      E-Cigarette/Vaping Substances     Social History     Tobacco Use    Smoking status: Never Smoker    Smokeless tobacco: Never Used   Vaping Use    Vaping Use: Never used   Substance Use Topics    Alcohol use: No    Drug use: No       Review of Systems   Constitutional: Negative  Negative for fever  HENT: Negative  Respiratory: Negative  Cardiovascular: Negative  Gastrointestinal: Negative  Musculoskeletal: Negative  Skin: Positive for wound  Negative for rash  Neurological: Negative for focal weakness  All other systems reviewed and are negative  Physical Exam  Physical Exam  Vitals and nursing note reviewed  Constitutional:       General: He is not in acute distress  Appearance: Normal appearance   He is normal weight  He is not ill-appearing, toxic-appearing or diaphoretic  HENT:      Head: Normocephalic  Nose: Nose normal       Mouth/Throat:      Mouth: Mucous membranes are moist    Eyes:      Conjunctiva/sclera: Conjunctivae normal       Pupils: Pupils are equal, round, and reactive to light  Pulmonary:      Effort: Pulmonary effort is normal    Musculoskeletal:         General: Normal range of motion  Cervical back: Normal range of motion  Skin:     General: Skin is warm and dry  Capillary Refill: Capillary refill takes less than 2 seconds  Coloration: Skin is not jaundiced or pale  Findings: No bruising, erythema or rash  Comments: 1 cm superficial avulsion to the distal aspect of the right ring finger on the palmar side  No nail involvement  No gross contamination  No active bleeding  No red streaking away from the wound  No foreign bodies  Easily able to visualize base of wound  Neurological:      General: No focal deficit present  Mental Status: He is alert and oriented to person, place, and time     Psychiatric:         Mood and Affect: Mood normal          Behavior: Behavior normal          Vital Signs  ED Triage Vitals   Temperature Pulse Respirations Blood Pressure SpO2   08/11/21 0948 08/11/21 0948 08/11/21 0948 08/11/21 0950 08/11/21 0948   97 7 °F (36 5 °C) 66 19 169/83 98 %      Temp Source Heart Rate Source Patient Position - Orthostatic VS BP Location FiO2 (%)   08/11/21 0948 -- 08/11/21 0948 08/11/21 0948 --   Temporal  Sitting Right arm       Pain Score       08/11/21 0948       No Pain           Vitals:    08/11/21 0948 08/11/21 0950   BP:  169/83   Pulse: 66    Patient Position - Orthostatic VS: Sitting          Visual Acuity      ED Medications  Medications   neomycin-bacitracin-polymyxin b (NEOSPORIN) ointment 1 small application (1 small application Topical Given 8/11/21 1034)   cephalexin (KEFLEX) capsule 500 mg (500 mg Oral Given 8/11/21 1034) Diagnostic Studies  Results Reviewed     None                 No orders to display              Procedures  Procedures         ED Course  ED Course as of Aug 11 1035   Wed Aug 11, 2021   1033 Wound was thoroughly irrigated  Neosporin and dressing was applied  Bleeding controlled  No foreign bodies  Easily able to visualize the base of the wound  I discussed wound care with the patient  Patient was prophylactically prescribed 3 day course of Keflex  Will follow-up with PCP for recheck of wound  Patient is agreed to treatment plan remained well emergency department was discharged home                    SBIRT 20yo+      Most Recent Value   SBIRT (24 yo +)   In order to provide better care to our patients, we are screening all of our patients for alcohol and drug use  Would it be okay to ask you these screening questions? Yes Filed at: 08/11/2021 0953   Initial Alcohol Screen: US AUDIT-C    1  How often do you have a drink containing alcohol?  0 Filed at: 08/11/2021 0953   2  How many drinks containing alcohol do you have on a typical day you are drinking? 0 Filed at: 08/11/2021 0953   3a  Male UNDER 65: How often do you have five or more drinks on one occasion? 0 Filed at: 08/11/2021 0953   3b  FEMALE Any Age, or MALE 65+: How often do you have 4 or more drinks on one occassion? 0 Filed at: 08/11/2021 0953   Audit-C Score  0 Filed at: 08/11/2021 1019   PRINCESS: How many times in the past year have you    Used an illegal drug or used a prescription medication for non-medical reasons? Never Filed at: 08/11/2021 7104                    MDM  Number of Diagnoses or Management Options  Finger laceration: new and requires workup  Diagnosis management comments: 80year old male presents the emergency department for evaluation of right ring finger laceration  Vitals and medical record reviewed  Injury happened yesterday  Tetanus up-to-date    On exam patient has 1 cm avulsion injury to the palmar aspect of the distal right ring finger  Wound was thoroughly irrigated  No signs of infection this time  Bleeding is controlled  No foreign bodies and base of wound easily visualized  Neosporin applied with dressing  We discussed wound care at home  Prophylactic antibiotics prescribed  Patient will follow-up with PCP  Patient was educated on symptoms that require prompt return to the emergency department for further evaluation and verbalized understanding  Amount and/or Complexity of Data Reviewed  Review and summarize past medical records: yes        Disposition  Final diagnoses:   Finger laceration     Time reflects when diagnosis was documented in both MDM as applicable and the Disposition within this note     Time User Action Codes Description Comment    8/11/2021 10:17 AM Rayray Eli Add [[id number]] Finger laceration       ED Disposition     ED Disposition Condition Date/Time Comment    Discharge Stable Wed Aug 11, 2021 10:15 AM Farheen Voss discharge to home/self care              Follow-up Information     Follow up With Specialties Details Why Contact Info    Richard Waldrop MD Family Medicine In 1 week For wound re-check [address] [facility]  [phone]            Discharge Medication List as of 8/11/2021 10:22 AM      START taking these medications    Details   cephalexin (KEFLEX) 500 mg capsule Take 1 capsule (500 mg total) by mouth every 6 (six) hours for 3 days, Starting Wed 8/11/2021, Until Sat 8/14/2021, Normal         CONTINUE these medications which have NOT CHANGED    Details   amLODIPine (NORVASC) 5 mg tablet Take 1 tablet (5 mg total) by mouth daily, Starting Sat 7/10/2021, Until Mon 8/9/2021, Normal      aspirin (ECOTRIN LOW STRENGTH) 81 mg EC tablet Take 324 mg by mouth daily , Historical Med      clopidogrel (PLAVIX) 75 mg tablet Take 75 mg by mouth daily, Historical Med      levothyroxine 25 mcg tablet Take 1 tablet by mouth once daily, Normal      Multiple Vitamin (MULTIVITAMIN) capsule Take 1 capsule by mouth daily, Historical Med      Omega-3 Fatty Acids (FISH OIL) 1200 MG CAPS Take 4 each by mouth daily , Historical Med      silver sulfadiazine (SILVADENE,SSD) 1 % cream Apply topically daily, Starting Mon 7/12/2021, Normal      simvastatin (ZOCOR) 40 mg tablet TAKE 1 TABLET BY MOUTH ONCE DAILY AT BEDTIME, Normal           No discharge procedures on file      PDMP Review       Value Time User    PDMP Reviewed  Yes 7/10/2021 10:10 AM Alvy Apley, MD          ED Provider  Electronically Signed by           Edyta Barth PA-C  08/11/21 9895

## 2021-08-11 NOTE — DISCHARGE INSTRUCTIONS
If you have any new or worsening symptoms please return to the ED  Please follow up with your family doctor in 5-7 days for recheck of your finger  If you have any signs of infections such as red streaking away from the wound, swelling, pus drainage please return immediately to the emergency department

## 2021-11-16 ENCOUNTER — RA CDI HCC (OUTPATIENT)
Dept: OTHER | Facility: HOSPITAL | Age: 81
End: 2021-11-16

## 2021-11-23 ENCOUNTER — OFFICE VISIT (OUTPATIENT)
Dept: FAMILY MEDICINE CLINIC | Facility: CLINIC | Age: 81
End: 2021-11-23
Payer: MEDICARE

## 2021-11-23 VITALS
SYSTOLIC BLOOD PRESSURE: 144 MMHG | BODY MASS INDEX: 31.47 KG/M2 | TEMPERATURE: 97.1 F | HEART RATE: 65 BPM | HEIGHT: 62 IN | WEIGHT: 171 LBS | OXYGEN SATURATION: 97 % | DIASTOLIC BLOOD PRESSURE: 92 MMHG

## 2021-11-23 DIAGNOSIS — M15.9 PRIMARY OSTEOARTHRITIS INVOLVING MULTIPLE JOINTS: Chronic | ICD-10-CM

## 2021-11-23 DIAGNOSIS — Z23 NEEDS FLU SHOT: ICD-10-CM

## 2021-11-23 DIAGNOSIS — I10 ESSENTIAL HYPERTENSION: Chronic | ICD-10-CM

## 2021-11-23 DIAGNOSIS — I25.810 CORONARY ARTERY DISEASE INVOLVING CORONARY BYPASS GRAFT OF NATIVE HEART WITHOUT ANGINA PECTORIS: Chronic | ICD-10-CM

## 2021-11-23 DIAGNOSIS — E11.9 TYPE 2 DIABETES MELLITUS WITHOUT COMPLICATION, WITHOUT LONG-TERM CURRENT USE OF INSULIN (HCC): Primary | ICD-10-CM

## 2021-11-23 DIAGNOSIS — E03.9 ACQUIRED HYPOTHYROIDISM: Chronic | ICD-10-CM

## 2021-11-23 LAB — SL AMB POCT HEMOGLOBIN AIC: 6.5 (ref ?–6.5)

## 2021-11-23 PROCEDURE — 83036 HEMOGLOBIN GLYCOSYLATED A1C: CPT | Performed by: FAMILY MEDICINE

## 2021-11-23 PROCEDURE — G0008 ADMIN INFLUENZA VIRUS VAC: HCPCS | Performed by: FAMILY MEDICINE

## 2021-11-23 PROCEDURE — 99214 OFFICE O/P EST MOD 30 MIN: CPT | Performed by: FAMILY MEDICINE

## 2021-11-23 PROCEDURE — 90662 IIV NO PRSV INCREASED AG IM: CPT | Performed by: FAMILY MEDICINE

## 2021-11-29 ENCOUNTER — APPOINTMENT (EMERGENCY)
Dept: RADIOLOGY | Facility: HOSPITAL | Age: 81
End: 2021-11-29
Payer: MEDICARE

## 2021-11-29 ENCOUNTER — HOSPITAL ENCOUNTER (EMERGENCY)
Facility: HOSPITAL | Age: 81
Discharge: HOME | End: 2021-11-29
Attending: EMERGENCY MEDICINE | Admitting: EMERGENCY MEDICINE
Payer: MEDICARE

## 2021-11-29 VITALS
DIASTOLIC BLOOD PRESSURE: 81 MMHG | SYSTOLIC BLOOD PRESSURE: 133 MMHG | TEMPERATURE: 97.8 F | HEART RATE: 69 BPM | WEIGHT: 160 LBS | RESPIRATION RATE: 19 BRPM | OXYGEN SATURATION: 94 % | HEIGHT: 62 IN | BODY MASS INDEX: 29.44 KG/M2

## 2021-11-29 DIAGNOSIS — M79.18 LEFT BUTTOCK PAIN: Primary | ICD-10-CM

## 2021-11-29 PROCEDURE — 72100 X-RAY EXAM L-S SPINE 2/3 VWS: CPT

## 2021-11-29 PROCEDURE — 73502 X-RAY EXAM HIP UNI 2-3 VIEWS: CPT

## 2021-11-29 PROCEDURE — 99284 EMERGENCY DEPT VISIT MOD MDM: CPT | Performed by: EMERGENCY MEDICINE

## 2021-11-29 PROCEDURE — 99283 EMERGENCY DEPT VISIT LOW MDM: CPT

## 2021-12-01 ENCOUNTER — OFFICE VISIT (OUTPATIENT)
Dept: FAMILY MEDICINE CLINIC | Facility: CLINIC | Age: 81
End: 2021-12-01
Payer: MEDICARE

## 2021-12-01 VITALS — DIASTOLIC BLOOD PRESSURE: 78 MMHG | HEART RATE: 70 BPM | OXYGEN SATURATION: 97 % | SYSTOLIC BLOOD PRESSURE: 128 MMHG

## 2021-12-01 DIAGNOSIS — M54.42 ACUTE LEFT-SIDED LOW BACK PAIN WITH LEFT-SIDED SCIATICA: ICD-10-CM

## 2021-12-01 DIAGNOSIS — Z11.59 NEED FOR HEPATITIS C SCREENING TEST: ICD-10-CM

## 2021-12-01 DIAGNOSIS — E11.9 TYPE 2 DIABETES MELLITUS WITHOUT COMPLICATION, WITHOUT LONG-TERM CURRENT USE OF INSULIN (HCC): Primary | ICD-10-CM

## 2021-12-01 PROCEDURE — 99213 OFFICE O/P EST LOW 20 MIN: CPT | Performed by: FAMILY MEDICINE

## 2021-12-01 RX ORDER — MELOXICAM 15 MG/1
15 TABLET ORAL DAILY
Qty: 15 TABLET | Refills: 3 | Status: SHIPPED | OUTPATIENT
Start: 2021-12-01

## 2021-12-03 ENCOUNTER — OFFICE VISIT (OUTPATIENT)
Dept: FAMILY MEDICINE CLINIC | Facility: CLINIC | Age: 81
End: 2021-12-03
Payer: MEDICARE

## 2021-12-03 VITALS
HEIGHT: 62 IN | OXYGEN SATURATION: 97 % | HEART RATE: 47 BPM | DIASTOLIC BLOOD PRESSURE: 74 MMHG | BODY MASS INDEX: 29.26 KG/M2 | SYSTOLIC BLOOD PRESSURE: 126 MMHG

## 2021-12-03 DIAGNOSIS — M54.42 ACUTE LEFT-SIDED LOW BACK PAIN WITH LEFT-SIDED SCIATICA: Primary | ICD-10-CM

## 2021-12-03 PROCEDURE — 99213 OFFICE O/P EST LOW 20 MIN: CPT | Performed by: FAMILY MEDICINE

## 2021-12-08 ENCOUNTER — EVALUATION (OUTPATIENT)
Dept: PHYSICAL THERAPY | Facility: CLINIC | Age: 81
End: 2021-12-08
Payer: MEDICARE

## 2021-12-08 DIAGNOSIS — S76.312D STRAIN OF LEFT HAMSTRING, SUBSEQUENT ENCOUNTER: Primary | ICD-10-CM

## 2021-12-08 DIAGNOSIS — M54.42 ACUTE LEFT-SIDED LOW BACK PAIN WITH LEFT-SIDED SCIATICA: ICD-10-CM

## 2021-12-08 PROCEDURE — 97535 SELF CARE MNGMENT TRAINING: CPT | Performed by: PHYSICAL THERAPIST

## 2021-12-08 PROCEDURE — 97140 MANUAL THERAPY 1/> REGIONS: CPT | Performed by: PHYSICAL THERAPIST

## 2021-12-08 PROCEDURE — 97161 PT EVAL LOW COMPLEX 20 MIN: CPT | Performed by: PHYSICAL THERAPIST

## 2021-12-10 ENCOUNTER — OFFICE VISIT (OUTPATIENT)
Dept: PHYSICAL THERAPY | Facility: CLINIC | Age: 81
End: 2021-12-10
Payer: MEDICARE

## 2021-12-10 DIAGNOSIS — S76.312D STRAIN OF LEFT HAMSTRING, SUBSEQUENT ENCOUNTER: ICD-10-CM

## 2021-12-10 DIAGNOSIS — M54.42 ACUTE LEFT-SIDED LOW BACK PAIN WITH LEFT-SIDED SCIATICA: Primary | ICD-10-CM

## 2021-12-10 PROCEDURE — 97140 MANUAL THERAPY 1/> REGIONS: CPT | Performed by: PHYSICAL THERAPIST

## 2021-12-10 PROCEDURE — 97112 NEUROMUSCULAR REEDUCATION: CPT | Performed by: PHYSICAL THERAPIST

## 2021-12-10 PROCEDURE — 97110 THERAPEUTIC EXERCISES: CPT | Performed by: PHYSICAL THERAPIST

## 2021-12-13 ENCOUNTER — OFFICE VISIT (OUTPATIENT)
Dept: PHYSICAL THERAPY | Facility: CLINIC | Age: 81
End: 2021-12-13
Payer: MEDICARE

## 2021-12-13 DIAGNOSIS — M54.42 ACUTE LEFT-SIDED LOW BACK PAIN WITH LEFT-SIDED SCIATICA: Primary | ICD-10-CM

## 2021-12-13 DIAGNOSIS — S76.312D STRAIN OF LEFT HAMSTRING, SUBSEQUENT ENCOUNTER: ICD-10-CM

## 2021-12-13 PROCEDURE — 97140 MANUAL THERAPY 1/> REGIONS: CPT | Performed by: PHYSICAL THERAPIST

## 2021-12-13 PROCEDURE — 97110 THERAPEUTIC EXERCISES: CPT | Performed by: PHYSICAL THERAPIST

## 2021-12-13 PROCEDURE — 97112 NEUROMUSCULAR REEDUCATION: CPT | Performed by: PHYSICAL THERAPIST

## 2021-12-16 ENCOUNTER — OFFICE VISIT (OUTPATIENT)
Dept: PHYSICAL THERAPY | Facility: CLINIC | Age: 81
End: 2021-12-16
Payer: MEDICARE

## 2021-12-16 DIAGNOSIS — M54.42 ACUTE LEFT-SIDED LOW BACK PAIN WITH LEFT-SIDED SCIATICA: Primary | ICD-10-CM

## 2021-12-16 DIAGNOSIS — S76.312D STRAIN OF LEFT HAMSTRING, SUBSEQUENT ENCOUNTER: ICD-10-CM

## 2021-12-16 PROCEDURE — 97112 NEUROMUSCULAR REEDUCATION: CPT

## 2021-12-16 PROCEDURE — 97140 MANUAL THERAPY 1/> REGIONS: CPT

## 2021-12-16 PROCEDURE — 97110 THERAPEUTIC EXERCISES: CPT

## 2021-12-17 ENCOUNTER — OFFICE VISIT (OUTPATIENT)
Dept: FAMILY MEDICINE CLINIC | Facility: CLINIC | Age: 81
End: 2021-12-17
Payer: MEDICARE

## 2021-12-17 VITALS
BODY MASS INDEX: 31.1 KG/M2 | HEIGHT: 62 IN | DIASTOLIC BLOOD PRESSURE: 74 MMHG | WEIGHT: 169 LBS | OXYGEN SATURATION: 98 % | HEART RATE: 55 BPM | SYSTOLIC BLOOD PRESSURE: 124 MMHG

## 2021-12-17 DIAGNOSIS — M54.42 ACUTE LEFT-SIDED LOW BACK PAIN WITH LEFT-SIDED SCIATICA: Primary | ICD-10-CM

## 2021-12-17 PROCEDURE — 99213 OFFICE O/P EST LOW 20 MIN: CPT | Performed by: FAMILY MEDICINE

## 2021-12-20 ENCOUNTER — OFFICE VISIT (OUTPATIENT)
Dept: PHYSICAL THERAPY | Facility: CLINIC | Age: 81
End: 2021-12-20
Payer: MEDICARE

## 2021-12-20 DIAGNOSIS — M54.42 ACUTE LEFT-SIDED LOW BACK PAIN WITH LEFT-SIDED SCIATICA: Primary | ICD-10-CM

## 2021-12-20 DIAGNOSIS — S76.312D STRAIN OF LEFT HAMSTRING, SUBSEQUENT ENCOUNTER: ICD-10-CM

## 2021-12-20 PROCEDURE — 97112 NEUROMUSCULAR REEDUCATION: CPT

## 2021-12-20 PROCEDURE — 97110 THERAPEUTIC EXERCISES: CPT

## 2021-12-20 PROCEDURE — 97140 MANUAL THERAPY 1/> REGIONS: CPT

## 2021-12-23 ENCOUNTER — APPOINTMENT (OUTPATIENT)
Dept: PHYSICAL THERAPY | Facility: CLINIC | Age: 81
End: 2021-12-23
Payer: MEDICARE

## 2021-12-27 ENCOUNTER — APPOINTMENT (OUTPATIENT)
Dept: PHYSICAL THERAPY | Facility: CLINIC | Age: 81
End: 2021-12-27
Payer: MEDICARE

## 2021-12-27 DIAGNOSIS — I10 ESSENTIAL HYPERTENSION: ICD-10-CM

## 2021-12-28 DIAGNOSIS — I10 ESSENTIAL HYPERTENSION: ICD-10-CM

## 2021-12-28 RX ORDER — AMLODIPINE BESYLATE 5 MG/1
5 TABLET ORAL DAILY
Qty: 30 TABLET | Refills: 5 | Status: SHIPPED | OUTPATIENT
Start: 2021-12-28 | End: 2022-08-01

## 2021-12-29 RX ORDER — AMLODIPINE BESYLATE 5 MG/1
5 TABLET ORAL DAILY
Qty: 90 TABLET | Refills: 1 | Status: SHIPPED | OUTPATIENT
Start: 2021-12-29

## 2021-12-30 ENCOUNTER — APPOINTMENT (OUTPATIENT)
Dept: PHYSICAL THERAPY | Facility: CLINIC | Age: 81
End: 2021-12-30
Payer: MEDICARE

## 2022-01-04 ENCOUNTER — APPOINTMENT (OUTPATIENT)
Dept: PHYSICAL THERAPY | Facility: CLINIC | Age: 82
End: 2022-01-04
Payer: MEDICARE

## 2022-01-05 ENCOUNTER — OFFICE VISIT (OUTPATIENT)
Dept: PHYSICAL THERAPY | Facility: CLINIC | Age: 82
End: 2022-01-05
Payer: MEDICARE

## 2022-01-05 DIAGNOSIS — S76.312D STRAIN OF LEFT HAMSTRING, SUBSEQUENT ENCOUNTER: ICD-10-CM

## 2022-01-05 DIAGNOSIS — M54.42 ACUTE LEFT-SIDED LOW BACK PAIN WITH LEFT-SIDED SCIATICA: Primary | ICD-10-CM

## 2022-01-05 PROCEDURE — 97110 THERAPEUTIC EXERCISES: CPT

## 2022-01-05 PROCEDURE — 97535 SELF CARE MNGMENT TRAINING: CPT

## 2022-01-05 PROCEDURE — 97140 MANUAL THERAPY 1/> REGIONS: CPT | Performed by: PHYSICAL THERAPIST

## 2022-01-05 PROCEDURE — 97140 MANUAL THERAPY 1/> REGIONS: CPT

## 2022-01-05 NOTE — PROGRESS NOTES
Daily Note     Today's date: 2022  Patient name: Makayla Harris  : 1940  MRN: [id number]  Referring provider: Suzie Sanchez MD  Dx:   Encounter Diagnosis     ICD-10-CM    1  Acute left-sided low back pain with left-sided sciatica  M54 42    2  Strain of left hamstring, subsequent encounter  S78 [id number]                   Subjective: No new c/o pain today  Objective: See treatment diary below      Assessment: Tolerated treatment well  Patient demonstrated a good understanding of his HEP  Plan: Patient discharged to [facility] at this time       Precautions:  PMH Lumbar fusion, DM, HTN, left TKA, OA, Cardiac bypass 4X, CAD, right shoulder RC arthroscopic repair      Manuals 12/10 12/13 12/16 12/20 1/5   Left HS, piriformis, gastroc, and quad with manual hip traction  10 min  10 min  10' 10' 10'                           Neuro Re-Ed      Front and retro step over  10x Bilat  6" step  15x Bilat  6" step  10x bilat 4"step 10x bilat 4"step    Bridge  2x10  2x10  2x10 2x10    Supine Tball ABD crunch  10x5" Hold  15x5" Hold  10x5" 10x5"    Stand lumbar extension against the bar   10x5" Hold  10x5" 10x5"                            Ther Ex      [facility]  10 min L1  10 min L1  10' L1 10' L1 10' L1   Stand hip ext  10x Bilat  HEP       Stand knee flex  10x Bilat  15x Left Only 3#  15x L only 4# 15x L only 4#    TB Stand TKE  10x5" Hold  Green  10x5" Hold   Blue  10x5" BLUE 10x5" BLUE    Front and lateral step up  10x Bilat  6" step  15x Bilat  6" step  15x bilat 6"step 2x10 bilat 6"step    Stair HR/TR   2x10 Bilat  6" step  2x10 bilat 6"step 2x10 bilat 6"step    Seated TB knee flex  2x10 Left Only Green 2x10 Left Only   Green  2x10 L only GREEN 2x10 L only GREEN    DKTC with Tball  10x5" Hold  DC       Supine Tball LTR   10x5" Hold   Bilat  10x5" bilat 10x5"            HEP update/review      25'   Ther Activity                      Gait Training    Modalities   12/16 12/20 1/5   MHP to the left HS  15 min  15 min to the LB in seated  15' to   LB in seated 15' to LB and L posterior knee

## 2022-01-05 NOTE — PROGRESS NOTES
PT Discharge    Today's date: 2022  Patient name: Rowdy Rodríguez  : 1940  MRN: [id number]  Referring provider: Jose David Stephens MD  Dx:   Encounter Diagnosis     ICD-10-CM    1  Acute left-sided low back pain with left-sided sciatica  M54 42    2  Strain of left hamstring, subsequent encounter  S76 701D                   Assessment  Assessment details: PT notes the patient with improved ROM and strength t/o the lumbar spine as well as the left hip and PT notes the patient has met all therapy goals and is ready for a DC with HEP  Impairments: abnormal gait, abnormal or restricted ROM, activity intolerance, impaired physical strength, lacks appropriate home exercise program and pain with function  Understanding of Dx/Px/POC: good   Prognosis: good    Goals  ST  Initiate HEP-MET  2  Decrease pain levels by 25-50%-MET  3  Increase ROM by 25-50%-MET   4  Increase strength by 1-2 mm grades-MET  LT  Improve spinal stability with increase trunk/core strength-MET   2  Decrease limitations with standing, walking and stairs-MET  3  Decrease limitations with trunk movement and lifting-MET  4  Decrease limitations with ADL-MET  5   DC with HEP-MET    Plan  Plan details: Transition to HEP  Patient would benefit from: skilled physical therapy  Planned modality interventions: thermotherapy: hydrocollator packs  Planned therapy interventions: manual therapy, neuromuscular re-education, patient education, self care, strengthening, stretching, therapeutic exercise, home exercise program, flexibility and graded exercise  Frequency: 2x week  Duration in weeks: 1  Treatment plan discussed with: patient        Subjective Evaluation    History of Present Illness  Date of onset: 11/15/2021  Mechanism of injury: trauma  Mechanism of injury: Patient reports a slip/trip on rag on the floor  Patient reports he did a full split with fall on the buttock    Patient reports development of immediate left hip and LE pain/symptoms with difficulty with standing, walking, stairs, squatting, lifting, carrying, ADL, and sleep  Patient reports he went to PCP for evaluation and was treated with medications as well as treatment by chiropractor with slight relief of symptoms but occasional sharp/shooting pain traveling from the buttock to the foot  Patient reports he is retired with significant PMH of left TKA, right knee arthroscopic repair, lumbar fusion, DM, HTN, cardiac bypass, CAD and right shoulder arthroscopic repair  Presently the patient has attended 6 sessions of skilled therapy and feels 100% improvement since the start of therapy  Patient reports the pain levels/symptoms are eliminated in the left LE  Patient reports he has returned to ADL and recreation with minimal to no limitations  Patient states he is happy with current status and feels he is ready a transition to [facility]  Pain  Current pain ratin  At best pain ratin  At worst pain ratin  Location: Left LE   Quality: sharp, radiating and knife-like  Relieving factors: rest  Aggravating factors: stair climbing, walking, standing and lifting  Progression: improved    Treatments  Current treatment: medication and physical therapy  Patient Goals  Patient goals for therapy: decreased pain, increased motion, increased strength and independence with ADLs/IADLs          Objective     Tenderness     Left Hip   No tenderness in the PSIS  Right Hip   No tenderness in the PSIS       Neurological Testing     Reflexes   Left   Patellar (L4): normal (2+)  Achilles (S1): normal (2+)    Right   Patellar (L4): normal (2+)  Achilles (S1): normal (2+)    Active Range of Motion     Lumbar   Flexion:  WFL  Extension: 28 degrees   Left lateral flexion:  WFL  Right lateral flexion:  WFL  Left rotation:  WFL  Right rotation:  WFL  Left Hip   Flexion: 62 degrees   Extension: WFL  Abduction: WFL  External rotation (90/90): 24 degrees   Internal rotation (90/90): 10 degrees   Left Knee   Normal active range of motion    Additional Active Range of Motion Details  PT notes improved ROM and strength t/o the lumbar spine with improved trunk/core strength with abdominals of 4/5 and lumbar paraspinals of 4/5   PT notes improved ROM and strength t/o the left hip and LE     Passive Range of Motion   Left Hip   Flexion: 65 degrees   External rotation (90/90): 26 degrees   Internal rotation (90/90): 13 degrees     Mobility   Patellar Mobility:   Left Knee   WFL: medial, lateral, superior and inferior  Strength/Myotome Testing     Left Hip   Planes of Motion   Flexion: 5  Extension: 4+  Abduction: 5  Adduction: 5  External rotation: 4+  Internal rotation: 5    Left Knee   Flexion: 4+  Extension: 5  Quadriceps contraction: good    Right Knee   Quadriceps contraction: good    Tests     Left Hip   Negative RIYA, FADIR and long sit  90/90 SLR: Negative  SLR: Negative                Precautions:  PMH Lumbar fusion, DM, HTN, left TKA, OA, Cardiac bypass 4X, CAD, right shoulder RC arthroscopic repair

## 2022-03-16 ENCOUNTER — RA CDI HCC (OUTPATIENT)
Dept: OTHER | Facility: HOSPITAL | Age: 82
End: 2022-03-16

## 2022-03-16 NOTE — PROGRESS NOTES
[address]  coding opportunities       Chart reviewed, no opportunity found: CHART REVIEWED, NO OPPORTUNITY FOUND        Patients Insurance     Medicare Insurance: Medicare

## 2022-03-23 ENCOUNTER — TELEPHONE (OUTPATIENT)
Dept: ADMINISTRATIVE | Facility: OTHER | Age: 82
End: 2022-03-23

## 2022-03-23 ENCOUNTER — OFFICE VISIT (OUTPATIENT)
Dept: FAMILY MEDICINE CLINIC | Facility: CLINIC | Age: 82
End: 2022-03-23
Payer: MEDICARE

## 2022-03-23 VITALS
WEIGHT: 169 LBS | SYSTOLIC BLOOD PRESSURE: 128 MMHG | HEIGHT: 62 IN | OXYGEN SATURATION: 96 % | DIASTOLIC BLOOD PRESSURE: 80 MMHG | BODY MASS INDEX: 31.1 KG/M2 | HEART RATE: 78 BPM

## 2022-03-23 DIAGNOSIS — I10 ESSENTIAL HYPERTENSION: Chronic | ICD-10-CM

## 2022-03-23 DIAGNOSIS — M15.9 PRIMARY OSTEOARTHRITIS INVOLVING MULTIPLE JOINTS: Chronic | ICD-10-CM

## 2022-03-23 DIAGNOSIS — E11.9 TYPE 2 DIABETES MELLITUS WITHOUT COMPLICATION, WITHOUT LONG-TERM CURRENT USE OF INSULIN (HCC): Primary | ICD-10-CM

## 2022-03-23 DIAGNOSIS — I25.810 CORONARY ARTERY DISEASE INVOLVING CORONARY BYPASS GRAFT OF NATIVE HEART WITHOUT ANGINA PECTORIS: Chronic | ICD-10-CM

## 2022-03-23 DIAGNOSIS — E03.9 ACQUIRED HYPOTHYROIDISM: Chronic | ICD-10-CM

## 2022-03-23 LAB — SL AMB POCT HEMOGLOBIN AIC: 6.8 (ref ?–6.5)

## 2022-03-23 PROCEDURE — 99214 OFFICE O/P EST MOD 30 MIN: CPT | Performed by: FAMILY MEDICINE

## 2022-03-23 PROCEDURE — 83036 HEMOGLOBIN GLYCOSYLATED A1C: CPT | Performed by: FAMILY MEDICINE

## 2022-03-23 NOTE — PATIENT INSTRUCTIONS
Having a lot of aches and pains with the joints particularly the hands which I feel is due to excessive activity  May try using the meloxicam if still has this at home but I feel should settle until doing the next thing that probably should not be done    Continue all meds as is

## 2022-03-23 NOTE — TELEPHONE ENCOUNTER
Upon review of the In Basket request and the patient's chart, initial outreach has been made via fax, please see Contacts section for details       Thank you  Cierra Ford

## 2022-03-23 NOTE — LETTER
Diabetic Eye Exam Form    Date Requested: 22  Patient: Avis Briscoe  Patient : 1940   Referring Provider: Meek Sheth MD      DIABETIC Eye Exam Date _______________________________    Type of Exam MUST be documented for Diabetic Eye Exams  Please CHECK ONE  Dilated Retinal Exam      Optomap-Iris Exam      Fundus Photography Completed       Left Eye - Please check Retinopathy AND Type or No Retinopathy      Exam did show retinopathy    Exam did not show retinopathy         Mild     Proliferative           Moderate    Severe            None         Right Eye - Please check Retinopathy AND Type or No Retinopathy     Exam did show retinopathy    Exam did not show retinopathy         Mild     Proliferative        Moderate    Severe        None       Comments __________________________________________________________    Practice Providing Exam ______________________________________________    Exam Performed By (print name) _______________________________________      Provider Signature ___________________________________________________    These reports are needed for  compliance  Please fax this completed form and a copy of the Diabetic Eye Exam report to our office located at [place] [address] as soon as possible via [phone] rob Leonr: Phone [phone]    We thank you for your assistance in treating our mutual patient

## 2022-03-23 NOTE — ASSESSMENT & PLAN NOTE
Has multiple joint problems due to very excessive activity    May try using the meloxicam at home and should try to be more reasonable with physical activity

## 2022-03-23 NOTE — TELEPHONE ENCOUNTER
----- Message from Uri Mejia sent at 3/23/2022  9:11 AM EDT -----  Regarding: dm eye exam [facility]  03/23/22 9:11 AM    Hello, our patient Leonie Rodriguez has had Diabetic Eye Exam completed/performed  Please assist in updating the patient chart by pulling the document from the Media Tab  The date of service is 1/19/22       Thank you,  Uri Mejia  [facility]

## 2022-03-23 NOTE — PROGRESS NOTES
Assessment/Plan:    Type 2 diabetes mellitus without complication, without long-term current use of insulin ([facility])  Continues to show good control  Watch sweets and starch in diet and push daily walking activity  Lab Results   Component Value Date    HGBA1C 6 8 (A) 03/23/2022       Essential hypertension  Overall stable, continue current regimen    Primary osteoarthritis involving multiple joints  Has multiple joint problems due to very excessive activity  May try using the meloxicam at home and should try to be more reasonable with physical activity    Acquired hypothyroidism  Overall stable, continue current regimen    Coronary artery disease involving coronary bypass graft of native heart without angina pectoris  I feel doing well  Continue current regimen and follow-up       Diagnoses and all orders for this visit:    Type 2 diabetes mellitus without complication, without long-term current use of insulin ([facility] )  -     Ambulatory Referral to Ophthalmology; Future  -     POCT hemoglobin A1c    Essential hypertension    Primary osteoarthritis involving multiple joints    Acquired hypothyroidism    Coronary artery disease involving coronary bypass graft of native heart without angina pectoris          Subjective:      Patient ID: Rowdy Rodríguez is a 80 y o  male  Patient has history of hypertension, hypothyroidism, coronary artery disease, diabetes mellitus adult and degenerative arthritis  Having a lot of problems with muscle and joint pain 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      The following portions of the patient's history were reviewed and updated as appropriate: allergies, current medications, past medical history, past social history and problem list     Review of Systems   Constitutional: Negative for activity change, appetite change, chills, fatigue, fever and unexpected weight change  HENT: Negative for congestion, rhinorrhea and trouble swallowing      Eyes: Negative for visual disturbance  Respiratory: Negative for apnea, cough, chest tightness and shortness of breath  Cardiovascular: Negative for chest pain, palpitations and leg swelling  Gastrointestinal: Negative for abdominal distention, abdominal pain, constipation and diarrhea  Endocrine: Negative for polyuria (Nocturia x1)  Genitourinary: Negative for difficulty urinating  Musculoskeletal: Positive for arthralgias and joint swelling ( in the hands)  Negative for myalgias  Skin: Negative for rash  Allergic/Immunologic: Negative for environmental allergies  Neurological: Positive for dizziness  Negative for weakness, light-headedness, numbness and headaches  Hematological: Negative for adenopathy  Psychiatric/Behavioral: Negative for agitation and sleep disturbance  Objective:      /80 (BP Location: Left arm, Patient Position: Sitting, Cuff Size: Standard)   Pulse 78   Ht 5' 2" (1 575 m)   Wt 76 7 kg (169 lb)   SpO2 96%   BMI 30 91 kg/m²          Physical Exam  Vitals and nursing note reviewed  Constitutional:       Appearance: Normal appearance  He is well-developed  HENT:      Head: Normocephalic  Eyes:      Extraocular Movements: Extraocular movements intact  Conjunctiva/sclera: Conjunctivae normal       Pupils: Pupils are equal, round, and reactive to light  Neck:      Thyroid: No thyromegaly  Vascular: No carotid bruit  Cardiovascular:      Rate and Rhythm: Normal rate and regular rhythm  Heart sounds: Murmur (Soft systolic murmur at left sternal border  Heart rate is 66) heard  Pulmonary:      Effort: Pulmonary effort is normal       Breath sounds: Normal breath sounds  Abdominal:      General: Abdomen is flat  There is no distension  Palpations: Abdomen is soft  There is no mass  Tenderness: There is no abdominal tenderness  Musculoskeletal:         General: Normal range of motion          Arms:       Cervical back: Normal range of motion and neck supple  No tenderness  Right lower leg: No edema  Left lower leg: No edema  Lymphadenopathy:      Cervical: No cervical adenopathy  Skin:     General: Skin is warm and dry  Findings: No rash  Neurological:      Mental Status: He is alert  Motor: No weakness        Coordination: Coordination normal       Gait: Gait abnormal       Deep Tendon Reflexes: Reflexes abnormal    Psychiatric:         Mood and Affect: Mood normal

## 2022-03-23 NOTE — ASSESSMENT & PLAN NOTE
Continues to show good control    Watch sweets and starch in diet and push daily walking activity  Lab Results   Component Value Date    HGBA1C 6 8 (A) 03/23/2022

## 2022-03-28 NOTE — TELEPHONE ENCOUNTER
As a follow-up, a second attempt has been made for outreach via fax, please see Contacts section for details      Thank you  Selam Wright

## 2022-03-31 NOTE — TELEPHONE ENCOUNTER
As a final attempt, a third outreach has been made via telephone call  The outcome of the telephone call was a fax request form to be sent to Office  Please see Contacts section for details  This encounter will be closed and completed by end of day  Should we receive the requested information because of previous outreach attempts, the requested patient's chart will be updated appropriately       Thank you  Maricruz Andersen

## 2022-07-25 ENCOUNTER — RA CDI HCC (OUTPATIENT)
Dept: OTHER | Facility: HOSPITAL | Age: 82
End: 2022-07-25

## 2022-07-25 NOTE — PROGRESS NOTES
[facility]  coding opportunities          Chart Reviewed number of suggestions sent to Provider: 1   [id number]    Patients Insurance     Medicare Insurance: Medicare

## 2022-07-26 ENCOUNTER — OFFICE VISIT (OUTPATIENT)
Dept: FAMILY MEDICINE CLINIC | Facility: CLINIC | Age: 82
End: 2022-07-26
Payer: MEDICARE

## 2022-07-26 VITALS
SYSTOLIC BLOOD PRESSURE: 122 MMHG | WEIGHT: 166 LBS | HEART RATE: 70 BPM | HEIGHT: 62 IN | DIASTOLIC BLOOD PRESSURE: 72 MMHG | OXYGEN SATURATION: 97 % | BODY MASS INDEX: 30.55 KG/M2

## 2022-07-26 DIAGNOSIS — K08.89 LOOSENING OF TOOTH: Primary | ICD-10-CM

## 2022-07-26 PROBLEM — Z95.2 H/O AORTIC VALVE REPLACEMENT: Status: ACTIVE | Noted: 2021-08-30

## 2022-07-26 PROCEDURE — 99213 OFFICE O/P EST LOW 20 MIN: CPT | Performed by: FAMILY MEDICINE

## 2022-07-26 RX ORDER — AMOXICILLIN 500 MG/1
2000 CAPSULE ORAL DAILY
Qty: 4 CAPSULE | Refills: 0 | Status: SHIPPED | OUTPATIENT
Start: 2022-07-26 | End: 2022-07-27

## 2022-07-26 NOTE — PATIENT INSTRUCTIONS
Discussed problem    Because of the history of left knee replacement will place on amoxicillin 500 mg with 4 capsules to be taken 1 hour before the dental procedure

## 2022-07-26 NOTE — PROGRESS NOTES
Assessment/Plan:    Loosening of tooth  Because the history of left knee replacement will place on dental for prophylaxis with 500 mg of amoxicillin  Four capsules taken 1 hour before procedure       Diagnoses and all orders for this visit:    Loosening of tooth  -     amoxicillin (AMOXIL) 500 mg capsule; Take 4 capsules (2,000 mg total) by mouth in the morning for 1 day 1 hour before dental procedure          Subjective:      Patient ID: Billie Qiu is a [de-identified] y o  male  Patient has history of hypertension, hypothyroidism, coronary artery disease, diabetes mellitus adult and degenerative arthritis with left knee replacement  Had loosen 2 of his front lower teeth and is going to have to have them extracted and has question about need for antibiotic prior to procedure      The following portions of the patient's history were reviewed and updated as appropriate: allergies, current medications, past medical history, past social history, past surgical history and problem list     Review of Systems   Constitutional: Negative for fever  HENT: Positive for dental problem (To loose lower front teeth)  Negative for facial swelling  Objective:      /72 (BP Location: Left arm, Patient Position: Sitting, Cuff Size: Standard)   Pulse 70   Ht 5' 2" (1 575 m)   Wt 75 3 kg (166 lb)   SpO2 97%   BMI 30 36 kg/m²          Physical Exam  Vitals and nursing note reviewed  Constitutional:       Appearance: Normal appearance  He is well-developed  HENT:      Head: Normocephalic  Mouth/Throat:      Comments: Right and corresponding left tooth her both lose  The gums do not appear irritated  Eyes:      Conjunctiva/sclera: Conjunctivae normal    Neck:      Thyroid: No thyromegaly  Cardiovascular:      Rate and Rhythm: Normal rate and regular rhythm  Heart sounds: Normal heart sounds  No murmur (Soft systolic murmur at left sternal border  Heart rate is 66) heard    Pulmonary:      Effort: Pulmonary effort is normal       Breath sounds: Normal breath sounds  Abdominal:      General: There is no distension  Palpations: There is no mass  Tenderness: There is no abdominal tenderness  Musculoskeletal:         General: Normal range of motion  Cervical back: Normal range of motion and neck supple  No tenderness  Right lower leg: No edema  Left lower leg: No edema  Lymphadenopathy:      Cervical: No cervical adenopathy  Skin:     General: Skin is warm and dry  Findings: No rash  Neurological:      Mental Status: He is alert and oriented to person, place, and time  Motor: No weakness        Coordination: Coordination normal       Gait: Gait normal    Psychiatric:         Mood and Affect: Mood normal

## 2022-07-26 NOTE — ASSESSMENT & PLAN NOTE
Because the history of left knee replacement will place on dental for prophylaxis with 500 mg of amoxicillin    Four capsules taken 1 hour before procedure

## 2022-08-01 ENCOUNTER — OFFICE VISIT (OUTPATIENT)
Dept: FAMILY MEDICINE CLINIC | Facility: CLINIC | Age: 82
End: 2022-08-01
Payer: MEDICARE

## 2022-08-01 VITALS
DIASTOLIC BLOOD PRESSURE: 70 MMHG | WEIGHT: 168 LBS | OXYGEN SATURATION: 96 % | SYSTOLIC BLOOD PRESSURE: 112 MMHG | BODY MASS INDEX: 30.91 KG/M2 | HEART RATE: 66 BPM | HEIGHT: 62 IN

## 2022-08-01 DIAGNOSIS — E03.9 ACQUIRED HYPOTHYROIDISM: ICD-10-CM

## 2022-08-01 DIAGNOSIS — E11.9 TYPE 2 DIABETES MELLITUS WITHOUT COMPLICATION, WITHOUT LONG-TERM CURRENT USE OF INSULIN (HCC): Primary | ICD-10-CM

## 2022-08-01 DIAGNOSIS — M15.9 PRIMARY OSTEOARTHRITIS INVOLVING MULTIPLE JOINTS: Chronic | ICD-10-CM

## 2022-08-01 DIAGNOSIS — Z00.00 MEDICARE ANNUAL WELLNESS VISIT, SUBSEQUENT: ICD-10-CM

## 2022-08-01 DIAGNOSIS — I25.810 CORONARY ARTERY DISEASE INVOLVING CORONARY BYPASS GRAFT OF NATIVE HEART WITHOUT ANGINA PECTORIS: Chronic | ICD-10-CM

## 2022-08-01 PROBLEM — K08.89 LOOSENING OF TOOTH: Status: RESOLVED | Noted: 2022-07-26 | Resolved: 2022-08-01

## 2022-08-01 LAB — SL AMB POCT HEMOGLOBIN AIC: 6.4 (ref ?–6.5)

## 2022-08-01 PROCEDURE — 83036 HEMOGLOBIN GLYCOSYLATED A1C: CPT | Performed by: FAMILY MEDICINE

## 2022-08-01 PROCEDURE — G0439 PPPS, SUBSEQ VISIT: HCPCS | Performed by: FAMILY MEDICINE

## 2022-08-01 PROCEDURE — 99214 OFFICE O/P EST MOD 30 MIN: CPT | Performed by: FAMILY MEDICINE

## 2022-08-01 PROCEDURE — 1123F ACP DISCUSS/DSCN MKR DOCD: CPT | Performed by: FAMILY MEDICINE

## 2022-08-01 NOTE — ASSESSMENT & PLAN NOTE
Continues to show good control with diet  Watch sweets and starch in diet and push daily walking activity  Did have eye exam and should have reports forwarded to office    Will check renal panel  Lab Results   Component Value Date    HGBA1C 6 4 08/01/2022

## 2022-08-01 NOTE — PROGRESS NOTES
Becky Edwrad is here for his Subsequent Wellness visit  Last Medicare Wellness visit information reviewed, patient interviewed and updates made to the record today  Health Risk Assessment:   Patient rates overall health as good  Patient feels that their physical health rating is same  Patient is satisfied with their life  Eyesight was rated as slightly worse  Hearing was rated as slightly worse  Patient feels that their emotional and mental health rating is same  Patients states they are sometimes angry  Patient states they are sometimes unusually tired/fatigued  Pain experienced in the last 7 days has been some  Patient's pain rating has been 1/10  Patient states that he has experienced no weight loss or gain in last 6 months  Continues to have his intermittent back and knee pain although this is much better    Fall Risk Screening: In the past year, patient has experienced: no history of falling in past year      Home Safety:  Patient does not have trouble with stairs inside or outside of their home  Patient has working smoke alarms and has no working carbon monoxide detector  Home safety hazards include: none  I did advise a carbon monoxide monitor in the house and grab bars in the bathroom    Nutrition:   Current diet is Regular and Limited junk food  Medications:   Patient is not currently taking any over-the-counter supplements  Patient is able to manage medications  No issues    Activities of Daily Living (ADLs)/Instrumental Activities of Daily Living (IADLs):   Walk and transfer into and out of bed and chair?: Yes  Dress and groom yourself?: Yes    Bathe or shower yourself?: Yes    Feed yourself? Yes  Do your laundry/housekeeping?: Yes  Manage your money, pay your bills and track your expenses?: Yes  Make your own meals?: Yes    Do your own shopping?: Yes    ADL comments:  Overall patient is very functional at home    No issues    Previous Hospitalizations:   Any hospitalizations or ED visits within the last 12 months?: No    How many hospitalizations have you had in the last year?: 1-2    Hospitalization Comments:  Patient follows up with [facility] for cardiology    Advance Care Planning:   Living will: Yes    Durable POA for healthcare: No    Advanced directive: Yes    Five wishes given: Yes      Comments:  No issues  Should bring in to office to have scanned in the chart    Cognitive Screening:   Provider or family/friend/caregiver concerned regarding cognition?: No    PREVENTIVE SCREENINGS      Cardiovascular Screening:    General: Screening Not Indicated and History Lipid Disorder      Diabetes Screening:     General: Screening Not Indicated and History Diabetes      Colorectal Cancer Screening:     General: Screening Not Indicated      Prostate Cancer Screening:    General: Screening Not Indicated      Osteoporosis Screening:    General: Screening Not Indicated      Abdominal Aortic Aneurysm (AAA) Screening:        General: Screening Not Indicated      Lung Cancer Screening:     General: Screening Not Indicated      Hepatitis C Screening:    General: Screening Not Indicated      Preventive Screening Comments:  Had followed up with routine eye care and should have this report  Forwarded to office    Screening, Brief Intervention, and Referral to Treatment (SBIRT)    Screening  Typical number of drinks in a day: 0  Typical number of drinks in a week: 0  Interpretation: Low risk drinking behavior  Single Item Drug Screening:  How often have you used an illegal drug (including marijuana) or a prescription medication for non-medical reasons in the past year? never    Single Item Drug Screen Score: 0  Interpretation: Negative screen for possible drug use disorder    Brief Intervention  Alcohol & drug use screenings were reviewed  No concerns regarding substance use disorder identified     Diabetic Foot Exam    Diabetic Foot Exam

## 2022-08-01 NOTE — PROGRESS NOTES
Assessment/Plan:    Acquired hypothyroidism  Will recheck thyroid levels and adjust meds accordingly    Coronary artery disease involving coronary bypass graft of native heart without angina pectoris  Overall appears stable  Continue current regimen and follow-up  Push activity as tolerated    Type 2 diabetes mellitus without complication, without long-term current use of insulin (HCC)  Continues to show good control with diet  Watch sweets and starch in diet and push daily walking activity  Did have eye exam and should have reports forwarded to office  Will check renal panel  Lab Results   Component Value Date    HGBA1C 6 4 08/01/2022       Primary osteoarthritis involving multiple joints  Remains symptomatic but is very physically active  Continue p r n  Meds    Essential hypertension  Overall stable, continue current regimen       Diagnoses and all orders for this visit:    Type 2 diabetes mellitus without complication, without long-term current use of insulin (HCC)  -     Microalbumin / creatinine urine ratio ([facility]); Future  -     POCT hemoglobin A1c  -     Ambulatory Referral to Ophthalmology; Future  -     TSH + Free T4; Future    Acquired hypothyroidism  -     Renal function panel; Future    Coronary artery disease involving coronary bypass graft of native heart without angina pectoris    Primary osteoarthritis involving multiple joints    Medicare annual wellness visit, subsequent          Subjective:      Patient ID: Miroslava Kingsley is a [de-identified] y o  male  Patient has history of hypertension, hypothyroidism, coronary artery disease, diabetes mellitus adult and degenerative arthritis  Continues to have his multiple joint pain particularly back and right knee but is very physically active at work    Overall has been feeling well      The following portions of the patient's history were reviewed and updated as appropriate: allergies, current medications, past medical history, past social history, past surgical history and problem listBMI Counseling: Body mass index is 30 73 kg/m²  The BMI is above normal  Nutrition recommendations include moderation in carbohydrate intake  Exercise recommendations include moderate physical activity 150 minutes/week  No pharmacotherapy was ordered  Rationale for BMI follow-up plan is due to patient being overweight or obese  Depression Screening and Follow-up Plan: Patient was screened for depression during today's encounter  They screened negative with a PHQ-2 score of 0  Falls Plan of Care: balance, strength, and gait training instructions were provided  Overall is very functional   Continue to push physical activity to maintain leg strength    Diabetic Foot Exam    Patient's shoes and socks removed  Right Foot/Ankle   Right Foot Inspection      Toe Exam: right toe deformity (Hammertoes and bunion at the great toe and 5th toe)  Sensory   Monofilament testing: intact    Vascular  Capillary refills: < 3 seconds  The right DP pulse is 1+  The right PT pulse is 2+  Left Foot/Ankle  Left Foot Inspection      Toe Exam: left toe deformity ( hammertoes)  Sensory   Monofilament testing: intact    Vascular  Capillary refills: < 3 seconds  The left DP pulse is 1+  The left PT pulse is 1+  Assign Risk Category  Deformity present  No loss of protective sensation  No weak pulses  Risk: 0        Review of Systems   Constitutional: Negative for activity change, appetite change, chills, fatigue, fever and unexpected weight change  HENT: Positive for hearing loss  Negative for congestion, dental problem (Just had 2 teeth pulled  Has upper denture), rhinorrhea and trouble swallowing  Eyes: Negative for visual disturbance  Respiratory: Negative for apnea, cough, chest tightness and shortness of breath  Cardiovascular: Negative for chest pain, palpitations and leg swelling     Gastrointestinal: Negative for abdominal distention, abdominal pain, constipation and diarrhea  Endocrine: Negative for polyuria ( nocturia x1)  Genitourinary: Negative for difficulty urinating ( stream is slow) and enuresis  Musculoskeletal: Positive for arthralgias and back pain  Negative for myalgias  Skin: Negative for rash  Allergic/Immunologic: Negative for environmental allergies  Neurological: Negative for dizziness, weakness, light-headedness, numbness and headaches  Hematological: Negative for adenopathy  Does not bruise/bleed easily  Psychiatric/Behavioral: Negative for agitation and sleep disturbance  Objective:      /70 (BP Location: Left arm, Patient Position: Sitting, Cuff Size: Standard)   Pulse 66   Ht 5' 2" (1 575 m)   Wt 76 2 kg (168 lb)   SpO2 96%   BMI 30 73 kg/m²          Physical Exam  Vitals and nursing note reviewed  Constitutional:       General: He is not in acute distress  Appearance: Normal appearance  He is well-developed  He is not diaphoretic  HENT:      Head: Normocephalic and atraumatic  Right Ear: Tympanic membrane, ear canal and external ear normal  Decreased hearing (Twenty-five decibel hearing screen off except at 500 hertz  No difficulty with conversation  Note patient does have hearing aids but he does not usually wear the) noted  Left Ear: Tympanic membrane, ear canal and external ear normal  Decreased hearing ( 25 decibel hearing screen off at 4000 hertz and 2000 hertz) noted  Nose: Nose normal       Mouth/Throat:      Mouth: Mucous membranes are moist       Dentition: Abnormal dentition ( multiple missing lower teeth)  Has dentures ( upper)  Eyes:      Extraocular Movements: Extraocular movements intact  Conjunctiva/sclera: Conjunctivae normal       Pupils: Pupils are equal, round, and reactive to light  Neck:      Thyroid: No thyromegaly  Vascular: No carotid bruit  Cardiovascular:      Rate and Rhythm: Normal rate and regular rhythm        Pulses: no weak pulses Dorsalis pedis pulses are 1+ on the right side and 1+ on the left side  Posterior tibial pulses are 2+ on the right side and 1+ on the left side  Heart sounds: Murmur ( soft systolic murmur at left sternal border  Heart rate is 72) heard  Pulmonary:      Effort: Pulmonary effort is normal       Breath sounds: Normal breath sounds  Abdominal:      General: Abdomen is flat  There is no distension  Palpations: Abdomen is soft  There is no mass  Tenderness: There is no abdominal tenderness  Musculoskeletal:         General: Normal range of motion  Cervical back: Normal range of motion and neck supple  No tenderness  Right lower leg: No edema  Left lower leg: No edema  Right foot: Deformity ( hammertoes and great toe bunion and 5th toe bunion) present  Left foot: Deformity ( hammertoes) present  Feet:    Feet:      Right foot:      Protective Sensation: 8 sites tested  8 sites sensed  Skin integrity: Skin integrity normal       Left foot:      Protective Sensation: 8 sites tested  8 sites sensed  Skin integrity: Skin integrity normal    Lymphadenopathy:      Cervical: No cervical adenopathy  Skin:     General: Skin is warm and dry  Capillary Refill: Capillary refill takes 2 to 3 seconds  Findings: No rash  Neurological:      General: No focal deficit present  Mental Status: He is alert and oriented to person, place, and time  Cranial Nerves: No cranial nerve deficit  Sensory: No sensory deficit  Motor: No weakness or abnormal muscle tone  Coordination: Coordination normal       Gait: Gait normal       Deep Tendon Reflexes: Reflexes abnormal ( reflexes diminished)  Psychiatric:         Behavior: Behavior normal          Thought Content:  Thought content normal          Judgment: Judgment normal

## 2022-08-01 NOTE — PATIENT INSTRUCTIONS
Overall patient is very functional   I did advised to replace the smoke detector with a combination smoke and carbon monoxide detector  Will check renal panel for history of diabetes although A1c shows excellent control  Will also check thyroid levels and adjust meds accordingly    Continue current regimen

## 2022-09-15 NOTE — ASSESSMENT & PLAN NOTE
As per telemetry  With ambulation, patient's heart rate remains lower 60s  Patient is not on any calcium channel blocker her beta-blocker-remained asymptomatic during ambulation  Patient follows up with his own cardiologist, recommends to follow up with cardiologist for further recommendation quit 20 years ago/cigarettes

## 2022-09-21 ENCOUNTER — OFFICE VISIT (OUTPATIENT)
Dept: FAMILY MEDICINE CLINIC | Facility: CLINIC | Age: 82
End: 2022-09-21
Payer: MEDICARE

## 2022-09-21 VITALS
HEIGHT: 62 IN | WEIGHT: 167 LBS | DIASTOLIC BLOOD PRESSURE: 74 MMHG | OXYGEN SATURATION: 97 % | SYSTOLIC BLOOD PRESSURE: 124 MMHG | HEART RATE: 56 BPM | BODY MASS INDEX: 30.73 KG/M2

## 2022-09-21 DIAGNOSIS — H10.9 BACTERIAL CONJUNCTIVITIS OF BOTH EYES: Primary | ICD-10-CM

## 2022-09-21 DIAGNOSIS — B96.89 BACTERIAL CONJUNCTIVITIS OF BOTH EYES: Primary | ICD-10-CM

## 2022-09-21 DIAGNOSIS — H10.9 BACTERIAL CONJUNCTIVITIS OF BOTH EYES: ICD-10-CM

## 2022-09-21 DIAGNOSIS — S69.92XA INJURY OF LEFT HAND, INITIAL ENCOUNTER: ICD-10-CM

## 2022-09-21 DIAGNOSIS — J06.9 VIRAL URI WITH COUGH: ICD-10-CM

## 2022-09-21 DIAGNOSIS — B96.89 BACTERIAL CONJUNCTIVITIS OF BOTH EYES: ICD-10-CM

## 2022-09-21 PROBLEM — N18.32 STAGE 3B CHRONIC KIDNEY DISEASE (HCC): Status: ACTIVE | Noted: 2022-09-21

## 2022-09-21 PROCEDURE — 99213 OFFICE O/P EST LOW 20 MIN: CPT | Performed by: FAMILY MEDICINE

## 2022-09-21 RX ORDER — BENZONATATE 100 MG/1
100 CAPSULE ORAL 3 TIMES DAILY PRN
Qty: 30 CAPSULE | Refills: 0 | Status: SHIPPED | OUTPATIENT
Start: 2022-09-21

## 2022-09-21 RX ORDER — SULFACETAMIDE SODIUM 100 MG/ML
2 SOLUTION/ DROPS OPHTHALMIC 4 TIMES DAILY
Qty: 15 ML | Refills: 0 | Status: SHIPPED | OUTPATIENT
Start: 2022-09-21

## 2022-09-21 RX ORDER — BENZONATATE 100 MG/1
100 CAPSULE ORAL 3 TIMES DAILY PRN
Qty: 30 CAPSULE | Refills: 0 | Status: SHIPPED | OUTPATIENT
Start: 2022-09-21 | End: 2022-09-21 | Stop reason: SDUPTHER

## 2022-09-21 NOTE — PROGRESS NOTES
Assessment/Plan:       1  Bacterial conjunctivitis of both eyes  Comments:  start bleph-10  Orders:  -     sulfacetamide (BLEPH-10) 10 % ophthalmic solution; Administer 2 drops to both eyes 4 (four) times a day  -     benzonatate (TESSALON PERLES) 100 mg capsule; Take 1 capsule (100 mg total) by mouth 3 (three) times a day as needed for cough    2  Viral URI with cough  Comments:  symptomatic tx  start prn tessalon          Subjective:      Patient ID: Makayla Harris is a [de-identified] y o  male  The patient has a 5-6 day hx of cough and sore throat and head and chest congestion  No fever  No GI sx  No earache  No loss of smell or taste  He has had 3 covid shots  His eyes are draining and crusty/pasty in the morning  The following portions of the patient's history were reviewed and updated as appropriate: allergies, current medications, past family history, past medical history, past social history, past surgical history, and problem list     Review of Systems   Respiratory: Positive for cough  Cardiovascular: Negative  Gastrointestinal: Negative  Objective:      /74 (BP Location: Left arm, Patient Position: Sitting, Cuff Size: Standard)   Pulse 56   Ht 5' 2" (1 575 m)   Wt 75 8 kg (167 lb)   SpO2 97%   BMI 30 54 kg/m²          Physical Exam  Vitals and nursing note reviewed  Constitutional:       Appearance: Normal appearance  HENT:      Head: Normocephalic and atraumatic  Nose: Nose normal       Mouth/Throat:      Mouth: Mucous membranes are moist    Eyes:      Extraocular Movements: Extraocular movements intact  Pupils: Pupils are equal, round, and reactive to light  Comments: B/l conjunctivitis   Cardiovascular:      Rate and Rhythm: Normal rate and regular rhythm  Pulses: Normal pulses  Pulmonary:      Effort: Pulmonary effort is normal       Breath sounds: Normal breath sounds     Abdominal:      General: Bowel sounds are normal       Palpations: Abdomen is soft  Musculoskeletal:      Cervical back: Normal range of motion  Skin:     General: Skin is warm and dry  Capillary Refill: Capillary refill takes less than 2 seconds  Neurological:      General: No focal deficit present  Mental Status: He is alert     Psychiatric:         Mood and Affect: Mood normal

## 2022-09-23 ENCOUNTER — TELEPHONE (OUTPATIENT)
Dept: FAMILY MEDICINE CLINIC | Facility: CLINIC | Age: 82
End: 2022-09-23

## 2022-09-23 DIAGNOSIS — R05.9 COUGH: Primary | ICD-10-CM

## 2022-09-23 RX ORDER — AZITHROMYCIN 250 MG/1
TABLET, FILM COATED ORAL
Qty: 6 TABLET | Refills: 0 | Status: SHIPPED | OUTPATIENT
Start: 2022-09-23 | End: 2022-09-27

## 2022-09-23 NOTE — TELEPHONE ENCOUNTER
Patients wife called saying his cough is not getting any better with the cough syrup, is asking for an antibiotic to be sent in

## 2022-10-10 ENCOUNTER — CLINICAL SUPPORT (OUTPATIENT)
Dept: FAMILY MEDICINE CLINIC | Facility: CLINIC | Age: 82
End: 2022-10-10
Payer: MEDICARE

## 2022-10-10 VITALS
HEART RATE: 65 BPM | OXYGEN SATURATION: 96 % | TEMPERATURE: 97.8 F | BODY MASS INDEX: 30.73 KG/M2 | DIASTOLIC BLOOD PRESSURE: 70 MMHG | SYSTOLIC BLOOD PRESSURE: 122 MMHG | HEIGHT: 62 IN | WEIGHT: 167 LBS

## 2022-10-10 DIAGNOSIS — Z23 NEEDS FLU SHOT: Primary | ICD-10-CM

## 2022-10-10 PROCEDURE — 90662 IIV NO PRSV INCREASED AG IM: CPT

## 2022-10-10 PROCEDURE — G0008 ADMIN INFLUENZA VIRUS VAC: HCPCS

## 2022-11-28 ENCOUNTER — RA CDI HCC (OUTPATIENT)
Dept: OTHER | Facility: HOSPITAL | Age: 82
End: 2022-11-28

## 2022-12-02 ENCOUNTER — OFFICE VISIT (OUTPATIENT)
Dept: FAMILY MEDICINE CLINIC | Facility: CLINIC | Age: 82
End: 2022-12-02

## 2022-12-02 VITALS
SYSTOLIC BLOOD PRESSURE: 122 MMHG | HEART RATE: 69 BPM | WEIGHT: 167.4 LBS | OXYGEN SATURATION: 92 % | DIASTOLIC BLOOD PRESSURE: 84 MMHG | BODY MASS INDEX: 30.8 KG/M2 | HEIGHT: 62 IN | TEMPERATURE: 98.5 F

## 2022-12-02 DIAGNOSIS — E03.9 ACQUIRED HYPOTHYROIDISM: Primary | Chronic | ICD-10-CM

## 2022-12-02 DIAGNOSIS — E11.9 TYPE 2 DIABETES MELLITUS WITHOUT COMPLICATION, WITHOUT LONG-TERM CURRENT USE OF INSULIN (HCC): ICD-10-CM

## 2022-12-02 DIAGNOSIS — N18.31 TYPE 2 DIABETES MELLITUS WITH STAGE 3A CHRONIC KIDNEY DISEASE, WITHOUT LONG-TERM CURRENT USE OF INSULIN (HCC): ICD-10-CM

## 2022-12-02 DIAGNOSIS — I25.810 CORONARY ARTERY DISEASE INVOLVING CORONARY BYPASS GRAFT OF NATIVE HEART WITHOUT ANGINA PECTORIS: Chronic | ICD-10-CM

## 2022-12-02 DIAGNOSIS — N18.32 STAGE 3B CHRONIC KIDNEY DISEASE (HCC): ICD-10-CM

## 2022-12-02 DIAGNOSIS — E11.22 TYPE 2 DIABETES MELLITUS WITH STAGE 3A CHRONIC KIDNEY DISEASE, WITHOUT LONG-TERM CURRENT USE OF INSULIN (HCC): ICD-10-CM

## 2022-12-02 DIAGNOSIS — M67.442 GANGLION CYST OF FLEXOR TENDON SHEATH OF FINGER OF LEFT HAND: ICD-10-CM

## 2022-12-02 DIAGNOSIS — I10 ESSENTIAL HYPERTENSION: Chronic | ICD-10-CM

## 2022-12-02 DIAGNOSIS — M15.9 PRIMARY OSTEOARTHRITIS INVOLVING MULTIPLE JOINTS: Chronic | ICD-10-CM

## 2022-12-02 LAB — SL AMB POCT HEMOGLOBIN AIC: 6.7 (ref ?–6.5)

## 2022-12-02 NOTE — ASSESSMENT & PLAN NOTE
Again Kulwinder continues to beat himself up but I feel is functioning well    Push activity as tolerated

## 2022-12-02 NOTE — PROGRESS NOTES
Name: Yrn Frye      : 1940      MRN: [id number]  Encounter Provider: Arabella Farah MD  Encounter Date: 2022   Encounter department: [address] [place] [address] PRIMARY CARE    Assessment & Plan     1  Acquired hypothyroidism  Assessment & Plan:  Need to recheck thyroid levels and will adjust meds accordingly    Orders:  -     TSH + Free T4; Future    2  Type 2 diabetes mellitus without complication, without long-term current use of insulin ([facility] )  Assessment & Plan:  Continues to show good control  Watch sweets in diet  Will check renal panel  Lab Results   Component Value Date    HGBA1C 6 4 2022       Orders:  -     Microalbumin / creatinine urine ratio ([facility]); Future  -     POCT hemoglobin A1c  -     Renal function panel; Future    3  Type 2 diabetes mellitus with stage 3a chronic kidney disease, without long-term current use of insulin ([facility])    4  Stage 3b chronic kidney disease ([facility] )    5  Essential hypertension  Assessment & Plan:  Overall stable, continue current regimen      6  Coronary artery disease involving coronary bypass graft of native heart without angina pectoris  Assessment & Plan:  Appears stable  Continue current regimen and follow-up  Push activity as tolerated      7  Primary osteoarthritis involving multiple joints  Assessment & Plan:  Again [employer] continues to beat himself up but I feel is functioning well  Push activity as tolerated      8  Ganglion cyst of flexor tendon sheath of finger of left hand  Assessment & Plan:  Try to avoid pressure on the area  If continues to enlarge may need to have excised           Subjective      Patient has history of hypertension, hypothyroidism, coronary artery disease, diabetes mellitus adult and degenerative arthritis  Continues to have his multiple joint pain particularly back and right knee but is very physically active at work    Does have some speech problem with swelling on the palm of the left hand although is not painful at this time    Review of Systems   Constitutional: Negative for activity change, appetite change, chills, fatigue, fever and unexpected weight change  HENT: Negative for congestion, rhinorrhea, trouble swallowing and voice change  Eyes: Negative for visual disturbance  Respiratory: Positive for shortness of breath ( this seems about at baseline)  Negative for apnea, cough and chest tightness  Cardiovascular: Negative for chest pain, palpitations and leg swelling  Gastrointestinal: Negative for abdominal distention, abdominal pain, constipation and diarrhea  Endocrine: Negative for polyuria (Nocturia times 0-1)  Genitourinary: Negative for difficulty urinating  Musculoskeletal: Positive for arthralgias and myalgias  Skin: Negative for rash  Allergic/Immunologic: Negative for environmental allergies  Neurological: Negative for dizziness, weakness, light-headedness, numbness and headaches  Hematological: Negative for adenopathy  Psychiatric/Behavioral: Negative for agitation and sleep disturbance         Current Outpatient Medications on File Prior to Visit   Medication Sig   • amLODIPine (NORVASC) 5 mg tablet Take 1 tablet (5 mg total) by mouth daily   • aspirin (ECOTRIN LOW STRENGTH) 81 mg EC tablet Take 324 mg by mouth daily    • clopidogrel (PLAVIX) 75 mg tablet Take 75 mg by mouth daily   • levothyroxine 25 mcg tablet Take 1 tablet by mouth once daily   • meloxicam (Mobic) 15 mg tablet Take 1 tablet (15 mg total) by mouth daily   • Multiple Vitamin (MULTIVITAMIN) capsule Take 1 capsule by mouth daily   • Omega-3 Fatty Acids (FISH OIL) 1200 MG CAPS Take 4 each by mouth daily    • simvastatin (ZOCOR) 40 mg tablet TAKE 1 TABLET BY MOUTH ONCE DAILY AT BEDTIME   • amLODIPine (NORVASC) 5 mg tablet Take 1 tablet (5 mg total) by mouth daily   • benzonatate (TESSALON PERLES) 100 mg capsule Take 1 capsule (100 mg total) by mouth 3 (three) times a day as needed for cough (Patient not taking: Reported on 12/2/2022)   • silver sulfadiazine (SILVADENE,SSD) 1 % cream Apply topically daily (Patient not taking: Reported on 12/2/2022)   • sulfacetamide (BLEPH-10) 10 % ophthalmic solution Administer 2 drops to both eyes 4 (four) times a day (Patient not taking: Reported on 12/2/2022)       Objective     /84 (BP Location: Left arm, Patient Position: Sitting, Cuff Size: Standard)   Pulse 69   Temp 98 5 °F (36 9 °C)   Ht 5' 2" (1 575 m)   Wt 75 9 kg (167 lb 6 4 oz)   SpO2 92%   BMI 30 62 kg/m²     Physical Exam  Vitals and nursing note reviewed  Constitutional:       Appearance: Normal appearance  He is well-developed  HENT:      Head: Normocephalic  Eyes:      Conjunctiva/sclera: Conjunctivae normal       Pupils: Pupils are equal, round, and reactive to light  Neck:      Thyroid: No thyromegaly  Vascular: No carotid bruit  Cardiovascular:      Rate and Rhythm: Normal rate and regular rhythm  Pulses: Intact distal pulses  Heart sounds: Murmur ( soft systolic murmur at left sternal border  Heart rate is 66) heard  Pulmonary:      Effort: Pulmonary effort is normal       Breath sounds: Normal breath sounds  Abdominal:      General: Abdomen is flat  There is no distension  Palpations: Abdomen is soft  There is no mass  Tenderness: There is no abdominal tenderness  Musculoskeletal:         General: No edema  Normal range of motion  Cervical back: Normal range of motion and neck supple  No tenderness  Right lower leg: No edema  Left lower leg: No edema  Comments: 2/3 cm soft mobile nodule that is nontender on the palm below the 3rd finger consistent with ganglion cyst   Lymphadenopathy:      Cervical: No cervical adenopathy  Skin:     General: Skin is warm and dry  Findings: No rash  Neurological:      Mental Status: He is alert and oriented to person, place, and time  Motor: No weakness  Coordination: Coordination normal       Gait: Gait abnormal       Deep Tendon Reflexes: Reflexes abnormal (Left reflex diminished right 1+)     Psychiatric:         Mood and Affect: Mood and affect and mood normal        Rozina Ward MD

## 2022-12-02 NOTE — ASSESSMENT & PLAN NOTE
Continues to show good control  Watch sweets in diet    Will check renal panel  Lab Results   Component Value Date    HGBA1C 6 4 08/01/2022

## 2022-12-02 NOTE — PATIENT INSTRUCTIONS
Overall seems to be doing well but does continue to have a lot of joint pain related to activity     Patient has had flu shot  Continue current meds    Needs to call Cardiology to see if they still want him on the Plavix which I am not sure why he is taking

## 2022-12-03 ENCOUNTER — LAB (OUTPATIENT)
Dept: LAB | Facility: HOSPITAL | Age: 82
End: 2022-12-03

## 2022-12-03 DIAGNOSIS — E11.9 TYPE 2 DIABETES MELLITUS WITHOUT COMPLICATION, WITHOUT LONG-TERM CURRENT USE OF INSULIN (HCC): ICD-10-CM

## 2022-12-03 DIAGNOSIS — E03.9 ACQUIRED HYPOTHYROIDISM: Chronic | ICD-10-CM

## 2022-12-03 LAB
ALBUMIN SERPL BCP-MCNC: 3.6 G/DL (ref 3.5–5)
ANION GAP SERPL CALCULATED.3IONS-SCNC: 7 MMOL/L (ref 4–13)
BUN SERPL-MCNC: 20 MG/DL (ref 5–25)
CALCIUM SERPL-MCNC: 8.7 MG/DL (ref 8.3–10.1)
CHLORIDE SERPL-SCNC: 102 MMOL/L (ref 96–108)
CO2 SERPL-SCNC: 28 MMOL/L (ref 21–32)
CREAT SERPL-MCNC: 1.16 MG/DL (ref 0.6–1.3)
CREAT UR-MCNC: 305 MG/DL
GFR SERPL CREATININE-BSD FRML MDRD: 59 ML/MIN/1.73SQ M
GLUCOSE SERPL-MCNC: 154 MG/DL (ref 65–140)
MICROALBUMIN UR-MCNC: 129 MG/L (ref 0–20)
MICROALBUMIN/CREAT 24H UR: 42 MG/G CREATININE (ref 0–30)
PHOSPHATE SERPL-MCNC: 2.6 MG/DL (ref 2.3–4.1)
POTASSIUM SERPL-SCNC: 4.5 MMOL/L (ref 3.5–5.3)
SODIUM SERPL-SCNC: 137 MMOL/L (ref 135–147)
T4 FREE SERPL-MCNC: 0.78 NG/DL (ref 0.76–1.46)
TSH SERPL DL<=0.05 MIU/L-ACNC: 4.27 UIU/ML (ref 0.45–4.5)

## 2022-12-07 NOTE — RESULT ENCOUNTER NOTE
Please call the patient regarding his abnormal result  Does show some protein in the urine at next visit we will discuss possible use of medication to help protect the kidneys in relationship to having diabetes  The fasting blood sugar is mildly elevated and continue to watch starch in diet as well as sweets    Thyroid levels are okay

## 2022-12-07 NOTE — RESULT ENCOUNTER NOTE
Attempted to call patient twice keep getting message that number is no longer in service , will try call at later time

## 2023-03-27 ENCOUNTER — RA CDI HCC (OUTPATIENT)
Dept: OTHER | Facility: HOSPITAL | Age: 83
End: 2023-03-27

## 2023-04-03 ENCOUNTER — OFFICE VISIT (OUTPATIENT)
Dept: FAMILY MEDICINE CLINIC | Facility: CLINIC | Age: 83
End: 2023-04-03

## 2023-04-03 VITALS
WEIGHT: 169 LBS | BODY MASS INDEX: 31.1 KG/M2 | HEIGHT: 62 IN | SYSTOLIC BLOOD PRESSURE: 124 MMHG | DIASTOLIC BLOOD PRESSURE: 76 MMHG | HEART RATE: 54 BPM | OXYGEN SATURATION: 94 %

## 2023-04-03 DIAGNOSIS — I10 ESSENTIAL HYPERTENSION: Chronic | ICD-10-CM

## 2023-04-03 DIAGNOSIS — E03.9 ACQUIRED HYPOTHYROIDISM: ICD-10-CM

## 2023-04-03 DIAGNOSIS — E11.22 TYPE 2 DIABETES MELLITUS WITH STAGE 3A CHRONIC KIDNEY DISEASE, WITHOUT LONG-TERM CURRENT USE OF INSULIN (HCC): Primary | ICD-10-CM

## 2023-04-03 DIAGNOSIS — E11.9 TYPE 2 DIABETES MELLITUS WITHOUT COMPLICATION, WITHOUT LONG-TERM CURRENT USE OF INSULIN (HCC): ICD-10-CM

## 2023-04-03 DIAGNOSIS — N18.31 TYPE 2 DIABETES MELLITUS WITH STAGE 3A CHRONIC KIDNEY DISEASE, WITHOUT LONG-TERM CURRENT USE OF INSULIN (HCC): Primary | ICD-10-CM

## 2023-04-03 DIAGNOSIS — N18.32 STAGE 3B CHRONIC KIDNEY DISEASE (HCC): ICD-10-CM

## 2023-04-03 DIAGNOSIS — M15.9 PRIMARY OSTEOARTHRITIS INVOLVING MULTIPLE JOINTS: Chronic | ICD-10-CM

## 2023-04-03 DIAGNOSIS — I25.810 CORONARY ARTERY DISEASE INVOLVING CORONARY BYPASS GRAFT OF NATIVE HEART WITHOUT ANGINA PECTORIS: Chronic | ICD-10-CM

## 2023-04-03 LAB — SL AMB POCT HEMOGLOBIN AIC: 7 (ref ?–6.5)

## 2023-04-03 RX ORDER — LEVOTHYROXINE SODIUM 0.03 MG/1
25 TABLET ORAL DAILY
Qty: 90 TABLET | Refills: 3 | Status: SHIPPED | OUTPATIENT
Start: 2023-04-03

## 2023-04-03 NOTE — PATIENT INSTRUCTIONS
Overall seems to be doing well  The A1c is up to 7 and is going to be more careful with the sweets and the starch in the diet    Continue baseline meds and try to push daily walking activity

## 2023-04-03 NOTE — ASSESSMENT & PLAN NOTE
Had recent cardiology evaluation  Overall doing well continue current medication and follow-up    Push activity as tolerated

## 2023-04-03 NOTE — ASSESSMENT & PLAN NOTE
A1c is higher    He is now watching diet more closely and push daily walking activity  Lab Results   Component Value Date    HGBA1C 7 0 (A) 04/03/2023

## 2023-04-03 NOTE — PROGRESS NOTES
Name: Corrinne Peaks      : 1940      MRN: [id number]  Encounter Provider: Violette Byrd MD  Encounter Date: 4/3/2023   Encounter department: [address] [place] [address] PRIMARY CARE    Assessment & Plan     1  Type 2 diabetes mellitus with stage 3a chronic kidney disease, without long-term current use of insulin ([facility])  Assessment & Plan:  A1c is higher  He is now watching diet more closely and push daily walking activity  Lab Results   Component Value Date    HGBA1C 7 0 (A) 2023         2  Type 2 diabetes mellitus without complication, without long-term current use of insulin ([facility])  -     POCT hemoglobin A1c    3  Stage 3b chronic kidney disease ([facility] )    4  Acquired hypothyroidism  Comments:  Overall stable continue current regimen  Assessment & Plan:  Stable, continue current regimen    Orders:  -     levothyroxine 25 mcg tablet; Take 1 tablet (25 mcg total) by mouth daily    5  Essential hypertension  Assessment & Plan:  Overall stable, continue current regimen      6  Coronary artery disease involving coronary bypass graft of native heart without angina pectoris  Assessment & Plan:  Had recent cardiology evaluation  Overall doing well continue current medication and follow-up  Push activity as tolerated      7  Primary osteoarthritis involving multiple joints  Assessment & Plan:  Is very active and seems to function well  Use the meloxicam as needed        Depression Screening and Follow-up Plan: Patient was screened for depression during today's encounter  They screened negative with a PHQ-2 score of 0  Subjective      Patient has history of hypertension, hypothyroidism, coronary artery disease, diabetes mellitus adult and degenerative arthritis    Continues to have his multiple joint pain particularly back and right knee but is very physically active at work    Review of Systems   Constitutional: Negative for activity change, appetite change, chills, fatigue, fever and unexpected weight change  HENT: Negative for congestion, rhinorrhea and trouble swallowing  Eyes: Negative for visual disturbance  Respiratory: Negative for apnea, cough, chest tightness and shortness of breath  Cardiovascular: Negative for chest pain, palpitations and leg swelling  Gastrointestinal: Negative for abdominal distention, abdominal pain, constipation and diarrhea  Endocrine: Negative for polyuria (Nocturia x0-1)  Genitourinary: Negative for difficulty urinating  Musculoskeletal: Positive for arthralgias  Negative for myalgias  Skin: Negative for rash  Allergic/Immunologic: Negative for environmental allergies  Neurological: Negative for dizziness, weakness, light-headedness, numbness and headaches  Hematological: Negative for adenopathy  Psychiatric/Behavioral: Negative for agitation and sleep disturbance (Although sleep is restless)         Current Outpatient Medications on File Prior to Visit   Medication Sig   • amLODIPine (NORVASC) 5 mg tablet Take 1 tablet (5 mg total) by mouth daily   • aspirin (ECOTRIN LOW STRENGTH) 81 mg EC tablet Take 324 mg by mouth daily    • clopidogrel (PLAVIX) 75 mg tablet Take 75 mg by mouth daily   • meloxicam (Mobic) 15 mg tablet Take 1 tablet (15 mg total) by mouth daily   • Multiple Vitamin (MULTIVITAMIN) capsule Take 1 capsule by mouth daily   • Omega-3 Fatty Acids (FISH OIL) 1200 MG CAPS Take 4 each by mouth daily    • silver sulfadiazine (SILVADENE,SSD) 1 % cream Apply topically daily   • simvastatin (ZOCOR) 40 mg tablet TAKE 1 TABLET BY MOUTH ONCE DAILY AT BEDTIME   • [DISCONTINUED] levothyroxine 25 mcg tablet Take 1 tablet by mouth once daily   • amLODIPine (NORVASC) 5 mg tablet Take 1 tablet (5 mg total) by mouth daily (Patient not taking: Reported on 4/3/2023)   • [DISCONTINUED] benzonatate (TESSALON PERLES) 100 mg capsule Take 1 capsule (100 mg total) by mouth 3 (three) times a day as needed for cough   • [DISCONTINUED] sulfacetamide "(BLEPH-10) 10 % ophthalmic solution Administer 2 drops to both eyes 4 (four) times a day       Objective     /76 (BP Location: Left arm, Patient Position: Sitting, Cuff Size: Standard)   Pulse (!) 54   Ht 5' 2\" (1 575 m)   Wt 76 7 kg (169 lb)   SpO2 94%   BMI 30 91 kg/m²     Physical Exam  Vitals and nursing note reviewed  Constitutional:       Appearance: Normal appearance  He is well-developed  HENT:      Head: Normocephalic  Nose: Nose normal    Eyes:      Conjunctiva/sclera: Conjunctivae normal    Neck:      Thyroid: No thyromegaly  Vascular: No carotid bruit  Cardiovascular:      Rate and Rhythm: Normal rate and regular rhythm  Heart sounds: Murmur heard  Gallop: Soft systolic murmur at left sternal border  Heart rate is 60  Pulmonary:      Effort: Pulmonary effort is normal       Breath sounds: Normal breath sounds  Abdominal:      General: There is no distension  Palpations: There is no mass  Tenderness: There is no abdominal tenderness  Musculoskeletal:         General: Normal range of motion  Cervical back: Normal range of motion and neck supple  No tenderness  Right lower leg: No edema  Left lower leg: No edema  Lymphadenopathy:      Cervical: No cervical adenopathy  Skin:     General: Skin is warm and dry  Findings: No rash  Neurological:      Mental Status: He is alert and oriented to person, place, and time  Motor: No weakness  Coordination: Coordination normal       Gait: Gait abnormal       Deep Tendon Reflexes: Reflexes abnormal (Reflexes diminished)     Psychiatric:         Mood and Affect: Mood normal        Jeannette Tanner MD  "

## 2023-05-17 ENCOUNTER — OFFICE VISIT (OUTPATIENT)
Dept: FAMILY MEDICINE CLINIC | Facility: CLINIC | Age: 83
End: 2023-05-17

## 2023-05-17 VITALS
SYSTOLIC BLOOD PRESSURE: 120 MMHG | DIASTOLIC BLOOD PRESSURE: 72 MMHG | WEIGHT: 170 LBS | BODY MASS INDEX: 31.28 KG/M2 | HEIGHT: 62 IN

## 2023-05-17 DIAGNOSIS — B35.6 TINEA CRURIS: Primary | ICD-10-CM

## 2023-05-17 RX ORDER — KETOCONAZOLE 20 MG/G
CREAM TOPICAL DAILY
Qty: 30 G | Refills: 1 | Status: SHIPPED | OUTPATIENT
Start: 2023-05-17

## 2023-05-17 NOTE — PROGRESS NOTES
Name: Davian Quinteros      : 1940      MRN: [id number]  Encounter Provider: Lamberto Martines MD  Encounter Date: 2023   Encounter department: [address] [place] [address] PRIMARY CARE    Assessment & Plan     1  Tinea cruris  Assessment & Plan:  Discussed problem  We will use Nizoral cream twice a day for 10 to 14 days  If this does not clear it should call    Orders:  -     ketoconazole (NIZORAL) 2 % cream; Apply topically daily         Subjective      Patient has history of hypertension, hypothyroidism, coronary artery disease, diabetes mellitus adult and degenerative arthritis  Seen today for rash in the left groin area over the last 4 days that is itchy and burning in nature  Did feel more comfortable when putting cocoa butter on it    Review of Systems   Constitutional: Negative for fever  Genitourinary: Negative for scrotal swelling and testicular pain  Skin: Positive for color change and rash (In the left groin area)  Hematological: Positive for adenopathy  Psychiatric/Behavioral: Negative for sleep disturbance         Current Outpatient Medications on File Prior to Visit   Medication Sig   • amLODIPine (NORVASC) 5 mg tablet Take 1 tablet (5 mg total) by mouth daily   • aspirin (ECOTRIN LOW STRENGTH) 81 mg EC tablet Take 324 mg by mouth daily    • clopidogrel (PLAVIX) 75 mg tablet Take 75 mg by mouth daily   • levothyroxine 25 mcg tablet Take 1 tablet (25 mcg total) by mouth daily   • meloxicam (Mobic) 15 mg tablet Take 1 tablet (15 mg total) by mouth daily   • Multiple Vitamin (MULTIVITAMIN) capsule Take 1 capsule by mouth daily   • Omega-3 Fatty Acids (FISH OIL) 1200 MG CAPS Take 4 each by mouth daily    • silver sulfadiazine (SILVADENE,SSD) 1 % cream Apply topically daily   • simvastatin (ZOCOR) 40 mg tablet TAKE 1 TABLET BY MOUTH ONCE DAILY AT BEDTIME   • amLODIPine (NORVASC) 5 mg tablet Take 1 tablet (5 mg total) by mouth daily (Patient not taking: Reported on 4/3/2023) "      Objective     /72 (BP Location: Left arm, Patient Position: Sitting, Cuff Size: Standard)   Ht 5' 2\" (1 575 m)   Wt 77 1 kg (170 lb)   BMI 31 09 kg/m²     Physical Exam  Vitals and nursing note reviewed  Constitutional:       Appearance: He is well-developed  HENT:      Head: Normocephalic  Eyes:      Conjunctiva/sclera: Conjunctivae normal    Neck:      Thyroid: No thyromegaly  Cardiovascular:      Rate and Rhythm: Normal rate and regular rhythm  Heart sounds: Murmur (Soft systolic murmur at left sternal border  Heart rate is 66) heard  Pulmonary:      Effort: Pulmonary effort is normal       Breath sounds: Normal breath sounds  Abdominal:      General: There is no distension  Palpations: There is no mass  Tenderness: There is no abdominal tenderness  Genitourinary:      Musculoskeletal:         General: Normal range of motion  Cervical back: Normal range of motion and neck supple  No tenderness  Right lower leg: No edema  Left lower leg: No edema  Lymphadenopathy:      Cervical: No cervical adenopathy  Skin:     General: Skin is warm and dry  Findings: Rash present  Neurological:      Mental Status: He is alert and oriented to person, place, and time  Motor: No weakness        Coordination: Coordination normal       Gait: Gait normal    Psychiatric:         Mood and Affect: Mood normal        Deepti Saini MD  "

## 2023-05-17 NOTE — ASSESSMENT & PLAN NOTE
Discussed problem  We will use Nizoral cream twice a day for 10 to 14 days    If this does not clear it should call

## 2023-05-17 NOTE — PATIENT INSTRUCTIONS
Discussed problem with the groin rash    I feel this is fungal in nature and will use Nizoral cream twice a day for 10 to 14 days

## 2023-06-01 LAB
LEFT EYE DIABETIC RETINOPATHY: NORMAL
RIGHT EYE DIABETIC RETINOPATHY: NORMAL

## 2023-07-15 NOTE — ASSESSMENT & PLAN NOTE
Much better at this time  Not sure if this related to the high dose of Prilosec or to the aspirin but seems to be tolerating current regimen well  I did advised to restart the Plavix which had been started by Neurology for suspicion of TIA and if the nausea returns should stop this    Continue other meds as is
Opt out

## 2023-08-02 ENCOUNTER — OFFICE VISIT (OUTPATIENT)
Dept: FAMILY MEDICINE CLINIC | Facility: CLINIC | Age: 83
End: 2023-08-02
Payer: MEDICARE

## 2023-08-02 VITALS
HEART RATE: 53 BPM | DIASTOLIC BLOOD PRESSURE: 72 MMHG | SYSTOLIC BLOOD PRESSURE: 120 MMHG | HEIGHT: 62 IN | OXYGEN SATURATION: 99 % | BODY MASS INDEX: 30.91 KG/M2 | WEIGHT: 168 LBS

## 2023-08-02 DIAGNOSIS — I10 ESSENTIAL HYPERTENSION: Chronic | ICD-10-CM

## 2023-08-02 DIAGNOSIS — Z23 ENCOUNTER FOR IMMUNIZATION: ICD-10-CM

## 2023-08-02 DIAGNOSIS — E03.9 ACQUIRED HYPOTHYROIDISM: Chronic | ICD-10-CM

## 2023-08-02 DIAGNOSIS — E11.9 TYPE 2 DIABETES MELLITUS WITHOUT COMPLICATION, WITHOUT LONG-TERM CURRENT USE OF INSULIN (HCC): ICD-10-CM

## 2023-08-02 DIAGNOSIS — M15.9 PRIMARY OSTEOARTHRITIS INVOLVING MULTIPLE JOINTS: Chronic | ICD-10-CM

## 2023-08-02 DIAGNOSIS — Z00.00 MEDICARE ANNUAL WELLNESS VISIT, SUBSEQUENT: ICD-10-CM

## 2023-08-02 DIAGNOSIS — N18.31 TYPE 2 DIABETES MELLITUS WITH STAGE 3A CHRONIC KIDNEY DISEASE, WITHOUT LONG-TERM CURRENT USE OF INSULIN (HCC): Primary | ICD-10-CM

## 2023-08-02 DIAGNOSIS — I25.810 CORONARY ARTERY DISEASE INVOLVING CORONARY BYPASS GRAFT OF NATIVE HEART WITHOUT ANGINA PECTORIS: Chronic | ICD-10-CM

## 2023-08-02 DIAGNOSIS — E11.22 TYPE 2 DIABETES MELLITUS WITH STAGE 3A CHRONIC KIDNEY DISEASE, WITHOUT LONG-TERM CURRENT USE OF INSULIN (HCC): Primary | ICD-10-CM

## 2023-08-02 PROCEDURE — G0439 PPPS, SUBSEQ VISIT: HCPCS | Performed by: FAMILY MEDICINE

## 2023-08-02 PROCEDURE — 99214 OFFICE O/P EST MOD 30 MIN: CPT | Performed by: FAMILY MEDICINE

## 2023-08-02 PROCEDURE — G0444 DEPRESSION SCREEN ANNUAL: HCPCS | Performed by: FAMILY MEDICINE

## 2023-08-02 NOTE — PATIENT INSTRUCTIONS
Overall patient is very functional.  Continue to push the physical activity and watch the starch in the diet. Continue all meds as it is.   Should bring the living will in the next time and we will scanned it into the chart

## 2023-08-02 NOTE — PROGRESS NOTES
Diabetic Foot Exam    Patient's shoes and socks removed. Right Foot/Ankle   Right Foot Inspection      Toe Exam:  no right toe deformity    Sensory   Monofilament testing: intact    Vascular  Capillary refills: < 3 seconds  The right DP pulse is 2+. The right PT pulse is 1+. Left Foot/Ankle  Left Foot Inspection      Toe Exam: No left toe deformity. Sensory   Monofilament testing: intact    Vascular  Capillary refills: < 3 seconds  The left DP pulse is 1+. The left PT pulse is 1+. Assign Risk Category  No deformity present  No loss of protective sensation  No weak pulses  Risk: 0       Assessment and Plan:     Problem List Items Addressed This Visit        Endocrine    Acquired hypothyroidism (Chronic)     Stable, continue current regimen         Type 2 diabetes mellitus with stage 3a chronic kidney disease, without long-term current use of insulin ([address]) - Primary     Showing adequate control. Continue to watch starch in diet and try to push daily walking activity. Lab Results   Component Value Date    HGBA1C 7.0 (A) 04/03/2023               Cardiovascular and Mediastinum    Coronary artery disease involving coronary bypass graft of native heart without angina pectoris (Chronic)     Seems to be doing well. Continue current regimen and follow-up. Push activity as tolerated         Essential hypertension (Chronic)     Overall stable, continue current regimen            Musculoskeletal and Integument    Primary osteoarthritis involving multiple joints (Chronic)     Remains symptomatic but is very physically active. Continue as needed meds        Other Visit Diagnoses     Type 2 diabetes mellitus without complication, without long-term current use of insulin ([address])        Encounter for immunization        Medicare annual wellness visit, subsequent            BMI Counseling: Body mass index is 30.73 kg/m². The BMI is above normal. Nutrition recommendations include moderation in carbohydrate intake. Exercise recommendations include moderate physical activity 150 minutes/week. No pharmacotherapy was ordered. Rationale for BMI follow-up plan is due to patient being overweight or obese. Depression Screening and Follow-up Plan: Patient was screened for depression during today's encounter. They screened negative with a PHQ-2 score of 0. Preventive health issues were discussed with patient, and age appropriate screening tests were ordered as noted in patient's After Visit Summary. Personalized health advice and appropriate referrals for health education or preventive services given if needed, as noted in patient's After Visit Summary. History of Present Illness:     Patient presents for a Medicare Wellness Visit    Patient has history of hypertension, hypothyroidism, coronary artery disease, diabetes mellitus adult and degenerative arthritis. Continues to have his multiple joint pain particularly back and right knee but is very physically active at work     Patient Care Team:  Zahraa Nelson MD as PCP - General (Family Medicine)     Review of Systems:     Review of Systems   Constitutional: Negative for activity change, appetite change, chills, fatigue, fever and unexpected weight change. HENT: Positive for hearing loss. Negative for congestion, dental problem, rhinorrhea and trouble swallowing. Eyes: Negative for visual disturbance. Respiratory: Negative for apnea, cough, chest tightness and shortness of breath. Cardiovascular: Negative for chest pain, palpitations and leg swelling. Gastrointestinal: Negative for abdominal distention, abdominal pain, constipation and diarrhea. Endocrine: Negative for polyuria (Nocturia x1). Genitourinary: Negative for difficulty urinating and enuresis. Musculoskeletal: Positive for arthralgias. Negative for myalgias. Skin: Negative for rash. Allergic/Immunologic: Negative for environmental allergies.    Neurological: Positive for dizziness (Occasional) and light-headedness (Sometimes when bending over and standing up). Negative for weakness, numbness and headaches. Hematological: Negative for adenopathy. Psychiatric/Behavioral: Negative for agitation and sleep disturbance.         Problem List:     Patient Active Problem List   Diagnosis   • Essential hypertension   • Coronary artery disease involving coronary bypass graft of native heart without angina pectoris   • Acquired hypothyroidism   • Primary osteoarthritis involving multiple joints   • RIYA (acute kidney injury) ([address])   • Dizzinesses   • Bradycardia   • Acute left-sided low back pain with left-sided sciatica   • H/O aortic valve replacement   • Stage 3b chronic kidney disease ([address])   • Type 2 diabetes mellitus with stage 3a chronic kidney disease, without long-term current use of insulin ([address])   • Ganglion cyst of flexor tendon sheath of finger of left hand   • Tinea cruris      Past Medical and Surgical History:     Past Medical History:   Diagnosis Date   • Chronic ischemic heart disease    • Coronary artery disease     hx cabg x4 2007   • Hearing loss    • Hyperlipidemia    • Hypertension    • Hypokalemia    • Hypothyroid    • OA (osteoarthritis)     left knee   • Prediabetes    • S/P AVR     2007   • Type 2 diabetes mellitus ([address])    • Wears dentures     full upper   • Wears glasses      Past Surgical History:   Procedure Laterality Date   • AORTIC VALVE REPLACEMENT     • BACK SURGERY      lumbar    • CATARACT EXTRACTION Bilateral    • CORONARY ARTERY BYPASS GRAFT      x4    2007   • KNEE ARTHROSCOPY Bilateral    • MA ARTHRP KNE CONDYLE&PLATU MEDIAL&LAT COMPARTMENTS Left 1/17/2018    Procedure: ARTHROPLASTY KNEE TOTAL;  Surgeon: Marissa Singletary MD;  Location: [facility];  Service: Orthopedics   • SHOULDER ARTHROSCOPY Right       Family History:     Family History   Problem Relation Age of Onset   • Cancer Mother       Social History:     Social History     Socioeconomic History   • Marital status: /Civil Union     Spouse name: None   • Number of children: None   • Years of education: None   • Highest education level: None   Occupational History   • Occupation: Repairs fire trucks    Tobacco Use   • Smoking status: Never   • Smokeless tobacco: Never   Vaping Use   • Vaping Use: Never used   Substance and Sexual Activity   • Alcohol use: No   • Drug use: No   • Sexual activity: Not Currently   Other Topics Concern   • None   Social History Narrative   • None     Social Determinants of Health     Financial Resource Strain: Low Risk  (8/2/2023)    Overall Financial Resource Strain (CARDIA)    • Difficulty of Paying Living Expenses: Not hard at all   Food Insecurity: Not on file   Transportation Needs: No Transportation Needs (8/2/2023)    PRAPARE - Transportation    • Lack of Transportation (Medical): No    • Lack of Transportation (Non-Medical):  No   Physical Activity: Not on file   Stress: Not on file   Social Connections: Not on file   Intimate Partner Violence: Not on file   Housing Stability: Not on file      Medications and Allergies:     Current Outpatient Medications   Medication Sig Dispense Refill   • amLODIPine (NORVASC) 5 mg tablet Take 1 tablet (5 mg total) by mouth daily 90 tablet 1   • aspirin (ECOTRIN LOW STRENGTH) 81 mg EC tablet Take 324 mg by mouth daily      • clopidogrel (PLAVIX) 75 mg tablet Take 75 mg by mouth daily     • levothyroxine 25 mcg tablet Take 1 tablet (25 mcg total) by mouth daily 90 tablet 3   • meloxicam (Mobic) 15 mg tablet Take 1 tablet (15 mg total) by mouth daily 15 tablet 3   • Multiple Vitamin (MULTIVITAMIN) capsule Take 1 capsule by mouth daily     • Omega-3 Fatty Acids (FISH OIL) 1200 MG CAPS Take 4 each by mouth daily      • simvastatin (ZOCOR) 40 mg tablet TAKE 1 TABLET BY MOUTH ONCE DAILY AT BEDTIME 90 tablet 3   • ketoconazole (NIZORAL) 2 % cream Apply topically daily 30 g 1   • silver sulfadiazine (SILVADENE,SSD) 1 % cream Apply topically daily 50 g 0     No current facility-administered medications for this visit. No Known Allergies   Immunizations:     Immunization History   Administered Date(s) Administered   • COVID-19 MODERNA VACC 0.5 ML IM 01/28/2021, 02/25/2021   • INFLUENZA 12/18/2007, 09/11/2008, 09/08/2009, 09/08/2010, 09/26/2011, 10/10/2012, 11/12/2013, 12/10/2014, 09/22/2015, 10/04/2016, 11/01/2017, 11/06/2018   • Influenza, high dose seasonal 0.7 mL 10/28/2019, 10/27/2020, 11/23/2021, 10/10/2022   • Pneumococcal Conjugate 13-Valent 05/02/2018   • Pneumococcal Polysaccharide PPV23 12/18/2007, 05/02/2018   • Tdap 07/08/2021      Health Maintenance: There are no preventive care reminders to display for this patient. Topic Date Due   • COVID-19 Vaccine (3 - Moderna series) 04/22/2021   • Influenza Vaccine (1) 09/01/2023      Medicare Screening Tests and Risk Assessments:     Courtney Stack is here for his Subsequent Wellness visit. Last Medicare Wellness visit information reviewed, patient interviewed and updates made to the record today. Health Risk Assessment:   Patient rates overall health as very good. Patient feels that their physical health rating is same. Patient is very satisfied with their life. Eyesight was rated as same. Hearing was rated as same. Patient feels that their emotional and mental health rating is much better. Patients states they are never, rarely angry. Patient states they are sometimes unusually tired/fatigued. Pain experienced in the last 7 days has been some. Patient's pain rating has been 5/10. Patient states that he has experienced no weight loss or gain in last 6 months. His multiple joint pain but is very physically active    Depression Screening:   PHQ-2 Score: 0      Fall Risk Screening: In the past year, patient has experienced: no history of falling in past year      Home Safety:  Patient does not have trouble with stairs inside or outside of their home.  Patient has working smoke alarms and has working carbon monoxide detector. Home safety hazards include: none. Does have grab bar for the bathroom but has not installed it yet    Nutrition:   Current diet is Regular and No Added Salt. Watch starch in diet    Medications:   Patient is currently taking over-the-counter supplements. OTC medications include: see medication list. Patient is able to manage medications. No issues    Activities of Daily Living (ADLs)/Instrumental Activities of Daily Living (IADLs):   Walk and transfer into and out of bed and chair?: Yes  Dress and groom yourself?: Yes    Bathe or shower yourself?: Yes    Feed yourself? Yes  Do your laundry/housekeeping?: Yes  Manage your money, pay your bills and track your expenses?: Yes  Make your own meals?: No    Do your own shopping?: No    ADL comments: Overall functions well around the house.   No issues    Previous Hospitalizations:   Any hospitalizations or ED visits within the last 12 months?: No      Hospitalization Comments: Patient follows up with cardiology through [address]:   Living will: Yes    Durable POA for healthcare: No    Advanced directive: Yes    Advanced directive counseling given: Yes    Five wishes given: No    End of Life Decisions reviewed with patient: No      Comments: Should bring into office to have scanned into the chart    Cognitive Screening:   Provider or family/friend/caregiver concerned regarding cognition?: No    PREVENTIVE SCREENINGS      Cardiovascular Screening:    General: Screening Not Indicated and History Lipid Disorder      Diabetes Screening:     General: Screening Not Indicated and History Diabetes      Colorectal Cancer Screening:     General: Screening Not Indicated      Prostate Cancer Screening:    General: Screening Not Indicated      Osteoporosis Screening:    General: Screening Not Indicated      Abdominal Aortic Aneurysm (AAA) Screening:        General: Screening Not Indicated      Lung Cancer Screening: General: Screening Not Indicated      Hepatitis C Screening:    General: Screening Not Indicated      Preventive Screening Comments: Continue follow-up with routine eye care and have reports forwarded to office    Screening, Brief Intervention, and Referral to Treatment (SBIRT)    Screening  Typical number of drinks in a day: 0  Typical number of drinks in a week: 0  Interpretation: Low risk drinking behavior. Single Item Drug Screening:  How often have you used an illegal drug (including marijuana) or a prescription medication for non-medical reasons in the past year? never    Single Item Drug Screen Score: 0  Interpretation: Negative screen for possible drug use disorder    Brief Intervention  Alcohol & drug use screenings were reviewed. No concerns regarding substance use disorder identified. No issues    Annual Depression Screening  Time spent screening and evaluating the patient for depression during today's encounter was 5 minutes. No results found. Physical Exam:     /72 (BP Location: Left arm, Patient Position: Sitting, Cuff Size: Standard)   Pulse (!) 53   Ht 5' 2" (1.575 m)   Wt 76.2 kg (168 lb)   SpO2 99%   BMI 30.73 kg/m²     Physical Exam  Vitals and nursing note reviewed. Constitutional:       Appearance: Normal appearance. HENT:      Head: Normocephalic. Right Ear: Tympanic membrane, ear canal and external ear normal. Decreased hearing (25 dB hearing screen is off. No difficulty with conversation) noted. Left Ear: Tympanic membrane, ear canal and external ear normal. Decreased hearing (25 dB hearing screen is off) noted. Nose: Nose normal.      Mouth/Throat:      Mouth: Mucous membranes are moist.      Dentition: Abnormal dentition (Multiple missing lower teeth). Has dentures (Upper dentures). Eyes:      Extraocular Movements: Extraocular movements intact.       Conjunctiva/sclera: Conjunctivae normal.      Pupils: Pupils are equal, round, and reactive to light.   Neck:      Vascular: No carotid bruit. Cardiovascular:      Rate and Rhythm: Normal rate and regular rhythm. Pulses: no weak pulses          Dorsalis pedis pulses are 2+ on the right side and 1+ on the left side. Posterior tibial pulses are 1+ on the right side and 1+ on the left side. Heart sounds: Murmur (Soft systolic murmur at left sternal border. Heart rate is 60) heard. Pulmonary:      Effort: Pulmonary effort is normal.      Breath sounds: Normal breath sounds. Abdominal:      General: Abdomen is flat. There is no distension. Palpations: Abdomen is soft. There is no mass. Tenderness: There is no abdominal tenderness. Musculoskeletal:         General: No swelling. Cervical back: Normal range of motion and neck supple. No tenderness. No muscular tenderness. Right lower leg: No edema. Left lower leg: No edema. Right foot: Deformity present. Left foot: Deformity present. Feet:    Feet:      Right foot:      Protective Sensation: 8 sites tested. 8 sites sensed. Skin integrity: Skin integrity normal.      Left foot:      Protective Sensation: 8 sites tested. 8 sites sensed. Skin integrity: Skin integrity normal.   Lymphadenopathy:      Cervical: No cervical adenopathy. Skin:     General: Skin is warm and dry. Capillary Refill: Capillary refill takes 2 to 3 seconds. Findings: No rash. Neurological:      General: No focal deficit present. Mental Status: He is alert and oriented to person, place, and time. Cranial Nerves: No cranial nerve deficit. Sensory: No sensory deficit. Motor: No weakness. Coordination: Coordination normal.      Gait: Gait normal.      Deep Tendon Reflexes: Reflexes abnormal (Reflexes diminished). Psychiatric:         Mood and Affect: Mood normal.         Behavior: Behavior normal.         Thought Content:  Thought content normal.         Judgment: Judgment normal. Jm Jones MD

## 2023-08-02 NOTE — ASSESSMENT & PLAN NOTE
Showing adequate control. Continue to watch starch in diet and try to push daily walking activity.   Lab Results   Component Value Date    HGBA1C 7.0 (A) 04/03/2023

## 2023-11-15 DIAGNOSIS — E78.00 HYPERCHOLESTEROLEMIA: ICD-10-CM

## 2023-11-15 RX ORDER — SIMVASTATIN 40 MG
TABLET ORAL
Qty: 90 TABLET | Refills: 1 | Status: SHIPPED | OUTPATIENT
Start: 2023-11-15

## 2023-11-28 ENCOUNTER — RA CDI HCC (OUTPATIENT)
Dept: OTHER | Facility: HOSPITAL | Age: 83
End: 2023-11-28

## 2023-12-04 ENCOUNTER — OFFICE VISIT (OUTPATIENT)
Dept: FAMILY MEDICINE CLINIC | Facility: CLINIC | Age: 83
End: 2023-12-04
Payer: MEDICARE

## 2023-12-04 VITALS
BODY MASS INDEX: 30.55 KG/M2 | SYSTOLIC BLOOD PRESSURE: 128 MMHG | WEIGHT: 166 LBS | DIASTOLIC BLOOD PRESSURE: 78 MMHG | HEIGHT: 62 IN

## 2023-12-04 DIAGNOSIS — I25.810 CORONARY ARTERY DISEASE INVOLVING CORONARY BYPASS GRAFT OF NATIVE HEART WITHOUT ANGINA PECTORIS: Chronic | ICD-10-CM

## 2023-12-04 DIAGNOSIS — N18.31 TYPE 2 DIABETES MELLITUS WITH STAGE 3A CHRONIC KIDNEY DISEASE, WITHOUT LONG-TERM CURRENT USE OF INSULIN (HCC): ICD-10-CM

## 2023-12-04 DIAGNOSIS — E11.9 TYPE 2 DIABETES MELLITUS WITHOUT COMPLICATION, WITHOUT LONG-TERM CURRENT USE OF INSULIN (HCC): Primary | ICD-10-CM

## 2023-12-04 DIAGNOSIS — E11.22 TYPE 2 DIABETES MELLITUS WITH STAGE 3A CHRONIC KIDNEY DISEASE, WITHOUT LONG-TERM CURRENT USE OF INSULIN (HCC): ICD-10-CM

## 2023-12-04 DIAGNOSIS — I10 ESSENTIAL HYPERTENSION: Chronic | ICD-10-CM

## 2023-12-04 DIAGNOSIS — E03.9 ACQUIRED HYPOTHYROIDISM: Chronic | ICD-10-CM

## 2023-12-04 DIAGNOSIS — Z23 ENCOUNTER FOR IMMUNIZATION: ICD-10-CM

## 2023-12-04 PROBLEM — M54.42 ACUTE LEFT-SIDED LOW BACK PAIN WITH LEFT-SIDED SCIATICA: Status: RESOLVED | Noted: 2021-12-01 | Resolved: 2023-12-04

## 2023-12-04 LAB — SL AMB POCT HEMOGLOBIN AIC: 7.1 (ref ?–6.5)

## 2023-12-04 PROCEDURE — 83036 HEMOGLOBIN GLYCOSYLATED A1C: CPT | Performed by: FAMILY MEDICINE

## 2023-12-04 PROCEDURE — G0008 ADMIN INFLUENZA VIRUS VAC: HCPCS | Performed by: FAMILY MEDICINE

## 2023-12-04 PROCEDURE — 90662 IIV NO PRSV INCREASED AG IM: CPT | Performed by: FAMILY MEDICINE

## 2023-12-04 PROCEDURE — 99214 OFFICE O/P EST MOD 30 MIN: CPT | Performed by: FAMILY MEDICINE

## 2023-12-04 NOTE — ASSESSMENT & PLAN NOTE
A1c slightly higher.   Needs to watch sweets and starch in diet  Lab Results   Component Value Date    HGBA1C 7.1 (A) 12/04/2023

## 2023-12-04 NOTE — PROGRESS NOTES
Name: Zonia Silva      : 1940      MRN: [id number]  Encounter Provider: Jarrett Rosario MD  Encounter Date: 2023   Encounter department: [facility]    Assessment & Plan     1. Type 2 diabetes mellitus without complication, without long-term current use of insulin (HCC)  -     POCT hemoglobin A1c  -     Basic metabolic panel; Future  -     Albumin / creatinine urine ratio; Future    2. Encounter for immunization  -     influenza vaccine, high-dose, PF 0.7 mL (FLUZONE HIGH-DOSE)    3. Type 2 diabetes mellitus with stage 3a chronic kidney disease, without long-term current use of insulin (HCC)  Assessment & Plan:  A1c slightly higher. Needs to watch sweets and starch in diet  Lab Results   Component Value Date    HGBA1C 7.1 (A) 2023         4. Essential hypertension  Assessment & Plan:  Overall stable, continue current regimen      5. Coronary artery disease involving coronary bypass graft of native heart without angina pectoris  Assessment & Plan:  Seems to be doing well. Continue current regimen and follow-up. Push activity as tolerated      6. Acquired hypothyroidism  Assessment & Plan:  Stable, continue current regimen             Subjective        Patient has history of hypertension, hypothyroidism, coronary artery disease, diabetes mellitus adult and degenerative arthritis. Continues to have his multiple joint pain particularly back and right knee but is very physically active at work      Review of Systems   Constitutional:  Negative for activity change, appetite change, chills, fatigue, fever and unexpected weight change. HENT:  Negative for congestion, rhinorrhea and trouble swallowing. Eyes:  Negative for visual disturbance. Respiratory:  Negative for apnea, cough, chest tightness and shortness of breath. Cardiovascular:  Negative for chest pain, palpitations and leg swelling.    Gastrointestinal:  Negative for abdominal distention, abdominal pain, constipation and diarrhea. Endocrine: Negative for polyuria (Nocturia x 1). Genitourinary:  Negative for difficulty urinating. Musculoskeletal:  Positive for arthralgias. Negative for myalgias. Skin:  Negative for rash. Allergic/Immunologic: Negative for environmental allergies. Neurological:  Positive for dizziness. Negative for weakness, light-headedness, numbness and headaches. Hematological:  Negative for adenopathy. Does not bruise/bleed easily. Psychiatric/Behavioral:  Negative for agitation and sleep disturbance. Current Outpatient Medications on File Prior to Visit   Medication Sig    amLODIPine (NORVASC) 5 mg tablet Take 1 tablet (5 mg total) by mouth daily    aspirin (ECOTRIN LOW STRENGTH) 81 mg EC tablet Take 324 mg by mouth daily     clopidogrel (PLAVIX) 75 mg tablet Take 75 mg by mouth daily    levothyroxine 25 mcg tablet Take 1 tablet (25 mcg total) by mouth daily    meloxicam (Mobic) 15 mg tablet Take 1 tablet (15 mg total) by mouth daily    Multiple Vitamin (MULTIVITAMIN) capsule Take 1 capsule by mouth daily    Omega-3 Fatty Acids (FISH OIL) 1200 MG CAPS Take 4 each by mouth daily     simvastatin (ZOCOR) 40 mg tablet TAKE 1 TABLET BY MOUTH ONCE DAILY AT BEDTIME    ketoconazole (NIZORAL) 2 % cream Apply topically daily    silver sulfadiazine (SILVADENE,SSD) 1 % cream Apply topically daily       Objective     Blood Pressure 128/78   Height 5' 2" (1.575 m)   Weight 75.3 kg (166 lb)   Body Mass Index 30.36 kg/m²     Physical Exam  Vitals and nursing note reviewed. Constitutional:       Appearance: Normal appearance. He is well-developed. HENT:      Head: Normocephalic. Nose: Nose normal.   Eyes:      Conjunctiva/sclera: Conjunctivae normal.   Neck:      Thyroid: No thyromegaly. Vascular: No carotid bruit. Cardiovascular:      Rate and Rhythm: Normal rate and regular rhythm.       Heart sounds: Murmur (Soft systolic murmur at left sternal border. Heart rate is 66) heard. Pulmonary:      Effort: Pulmonary effort is normal.      Breath sounds: Normal breath sounds. Abdominal:      General: Abdomen is flat. There is no distension. Palpations: Abdomen is soft. There is no mass. Tenderness: There is no abdominal tenderness. Musculoskeletal:         General: Normal range of motion. Cervical back: Normal range of motion and neck supple. No tenderness. Right lower leg: No edema. Left lower leg: No edema. Lymphadenopathy:      Cervical: No cervical adenopathy. Skin:     General: Skin is warm and dry. Findings: No rash. Neurological:      Mental Status: He is alert and oriented to person, place, and time. Motor: No weakness. Coordination: Coordination normal.      Gait: Gait normal.      Deep Tendon Reflexes: Reflexes abnormal (Reflexes diminished).    Psychiatric:         Mood and Affect: Mood normal.       Aarti Diamond MD

## 2023-12-04 NOTE — PATIENT INSTRUCTIONS
Overall seems to be doing well. Will continue all meds as is. A1c is 7.1 and slightly higher. Watch the sweets and starch in the diet.   Will get flu shot today and hopefully will not get a cold in the next 2 weeks

## 2023-12-20 LAB
CREAT ?TM UR-SCNC: 99.6 UMOL/L
EXT ALBUMIN URINE RANDOM: 9
MICROALBUMIN/CREAT UR: 0.09 MG/G{CREAT}

## 2024-01-06 ENCOUNTER — APPOINTMENT (EMERGENCY)
Dept: RADIOLOGY | Facility: HOSPITAL | Age: 84
End: 2024-01-06
Payer: MEDICARE

## 2024-01-06 ENCOUNTER — HOSPITAL ENCOUNTER (EMERGENCY)
Facility: HOSPITAL | Age: 84
Discharge: HOME | End: 2024-01-06
Attending: EMERGENCY MEDICINE
Payer: MEDICARE

## 2024-01-06 VITALS
SYSTOLIC BLOOD PRESSURE: 157 MMHG | BODY MASS INDEX: 32.25 KG/M2 | HEART RATE: 46 BPM | OXYGEN SATURATION: 90 % | TEMPERATURE: 97.3 F | DIASTOLIC BLOOD PRESSURE: 99 MMHG | HEIGHT: 62 IN | WEIGHT: 175.27 LBS | RESPIRATION RATE: 16 BRPM

## 2024-01-06 DIAGNOSIS — R00.1 BRADYCARDIA: Primary | ICD-10-CM

## 2024-01-06 DIAGNOSIS — R79.89 ELEVATED TSH: ICD-10-CM

## 2024-01-06 LAB
2HR DELTA HS TROPONIN: -4 NG/L
ALBUMIN SERPL BCP-MCNC: 4.3 G/DL (ref 3.5–5)
ALP SERPL-CCNC: 47 U/L (ref 34–104)
ALT SERPL W P-5'-P-CCNC: 42 U/L (ref 7–52)
ANION GAP SERPL CALCULATED.3IONS-SCNC: 7 MMOL/L
APTT PPP: 29 SECONDS (ref 23–37)
AST SERPL W P-5'-P-CCNC: 34 U/L (ref 13–39)
BASOPHILS # BLD AUTO: 0.03 THOUSANDS/ÂΜL (ref 0–0.1)
BASOPHILS NFR BLD AUTO: 0 % (ref 0–1)
BILIRUB SERPL-MCNC: 0.84 MG/DL (ref 0.2–1)
BNP SERPL-MCNC: 99 PG/ML (ref 0–100)
BUN SERPL-MCNC: 15 MG/DL (ref 5–25)
CALCIUM SERPL-MCNC: 9 MG/DL (ref 8.4–10.2)
CARDIAC TROPONIN I PNL SERPL HS: 18 NG/L
CARDIAC TROPONIN I PNL SERPL HS: 22 NG/L
CHLORIDE SERPL-SCNC: 103 MMOL/L (ref 96–108)
CO2 SERPL-SCNC: 27 MMOL/L (ref 21–32)
CREAT SERPL-MCNC: 1.07 MG/DL (ref 0.6–1.3)
D DIMER PPP FEU-MCNC: 0.43 UG/ML FEU
EOSINOPHIL # BLD AUTO: 0.09 THOUSAND/ÂΜL (ref 0–0.61)
EOSINOPHIL NFR BLD AUTO: 1 % (ref 0–6)
ERYTHROCYTE [DISTWIDTH] IN BLOOD BY AUTOMATED COUNT: 13.2 % (ref 11.6–15.1)
GFR SERPL CREATININE-BSD FRML MDRD: 64 ML/MIN/1.73SQ M
GLUCOSE SERPL-MCNC: 169 MG/DL (ref 65–140)
HCT VFR BLD AUTO: 43.1 % (ref 36.5–49.3)
HGB BLD-MCNC: 15.3 G/DL (ref 12–17)
IMM GRANULOCYTES # BLD AUTO: 0.05 THOUSAND/UL (ref 0–0.2)
IMM GRANULOCYTES NFR BLD AUTO: 1 % (ref 0–2)
INR PPP: 0.94 (ref 0.84–1.19)
LACTATE SERPL-SCNC: 1.3 MMOL/L (ref 0.5–2)
LIPASE SERPL-CCNC: 25 U/L (ref 11–82)
LYMPHOCYTES # BLD AUTO: 3.2 THOUSANDS/ÂΜL (ref 0.6–4.47)
LYMPHOCYTES NFR BLD AUTO: 40 % (ref 14–44)
MCH RBC QN AUTO: 30.7 PG (ref 26.8–34.3)
MCHC RBC AUTO-ENTMCNC: 35.5 G/DL (ref 31.4–37.4)
MCV RBC AUTO: 87 FL (ref 82–98)
MONOCYTES # BLD AUTO: 0.77 THOUSAND/ÂΜL (ref 0.17–1.22)
MONOCYTES NFR BLD AUTO: 10 % (ref 4–12)
NEUTROPHILS # BLD AUTO: 3.89 THOUSANDS/ÂΜL (ref 1.85–7.62)
NEUTS SEG NFR BLD AUTO: 48 % (ref 43–75)
NRBC BLD AUTO-RTO: 0 /100 WBCS
PLATELET # BLD AUTO: 167 THOUSANDS/UL (ref 149–390)
PMV BLD AUTO: 10.3 FL (ref 8.9–12.7)
POTASSIUM SERPL-SCNC: 4.2 MMOL/L (ref 3.5–5.3)
PROT SERPL-MCNC: 7.2 G/DL (ref 6.4–8.4)
PROTHROMBIN TIME: 12.9 SECONDS (ref 11.6–14.5)
RBC # BLD AUTO: 4.98 MILLION/UL (ref 3.88–5.62)
SODIUM SERPL-SCNC: 137 MMOL/L (ref 135–147)
T4 FREE SERPL-MCNC: 0.79 NG/DL (ref 0.61–1.12)
TSH SERPL DL<=0.05 MIU/L-ACNC: 5.83 UIU/ML (ref 0.45–4.5)
WBC # BLD AUTO: 8.03 THOUSAND/UL (ref 4.31–10.16)

## 2024-01-06 PROCEDURE — 85610 PROTHROMBIN TIME: CPT | Performed by: EMERGENCY MEDICINE

## 2024-01-06 PROCEDURE — 85730 THROMBOPLASTIN TIME PARTIAL: CPT | Performed by: EMERGENCY MEDICINE

## 2024-01-06 PROCEDURE — 84484 ASSAY OF TROPONIN QUANT: CPT | Performed by: EMERGENCY MEDICINE

## 2024-01-06 PROCEDURE — 85379 FIBRIN DEGRADATION QUANT: CPT | Performed by: EMERGENCY MEDICINE

## 2024-01-06 PROCEDURE — 71045 X-RAY EXAM CHEST 1 VIEW: CPT

## 2024-01-06 PROCEDURE — 99285 EMERGENCY DEPT VISIT HI MDM: CPT | Performed by: EMERGENCY MEDICINE

## 2024-01-06 PROCEDURE — 83880 ASSAY OF NATRIURETIC PEPTIDE: CPT | Performed by: EMERGENCY MEDICINE

## 2024-01-06 PROCEDURE — 84443 ASSAY THYROID STIM HORMONE: CPT | Performed by: EMERGENCY MEDICINE

## 2024-01-06 PROCEDURE — 83690 ASSAY OF LIPASE: CPT | Performed by: EMERGENCY MEDICINE

## 2024-01-06 PROCEDURE — 80053 COMPREHEN METABOLIC PANEL: CPT | Performed by: EMERGENCY MEDICINE

## 2024-01-06 PROCEDURE — 93005 ELECTROCARDIOGRAM TRACING: CPT

## 2024-01-06 PROCEDURE — 84439 ASSAY OF FREE THYROXINE: CPT | Performed by: EMERGENCY MEDICINE

## 2024-01-06 PROCEDURE — 36415 COLL VENOUS BLD VENIPUNCTURE: CPT | Performed by: EMERGENCY MEDICINE

## 2024-01-06 PROCEDURE — 99285 EMERGENCY DEPT VISIT HI MDM: CPT

## 2024-01-06 PROCEDURE — 85025 COMPLETE CBC W/AUTO DIFF WBC: CPT | Performed by: EMERGENCY MEDICINE

## 2024-01-06 PROCEDURE — 83605 ASSAY OF LACTIC ACID: CPT | Performed by: EMERGENCY MEDICINE

## 2024-01-06 NOTE — DISCHARGE INSTRUCTIONS
Your TSH level which measures thyroid function and slightly elevated.  He will need to follow-up with your family doctor for further testing and possible medication adjustment.

## 2024-01-06 NOTE — ED PROVIDER NOTES
History  Chief Complaint   Patient presents with    Slow Heart Rate     Patient reports this morning while lying in bed he felt that his heart was beating slower than normal. Granddaughter expressed that he seems more short of breath than normal.      Patient was lying in bed today when he felt his heart rate being slower than normal.  No headaches.  No chest pain.  No weakness or lightheadedness.  Per daughter she seemed to think that he was a little more short of breath and slower walking.  No recent cough or cold symptoms.  No rash.  Is on Plavix.      History provided by:  Patient   used: No    Medical Problem  Location:  Slow heart rate  Severity:  Mild  Onset quality:  Gradual  Duration: Started this morning.  Timing:  Constant  Progression:  Waxing and waning  Chronicity:  New  Context:  Felt his heart beating slow today.  Relieved by:  Nothing  Worsened by:  Nothing  Ineffective treatments:  None tried  Associated symptoms: shortness of breath    Associated symptoms: no abdominal pain, no chest pain, no congestion, no cough, no diarrhea, no ear pain, no fatigue, no fever, no headaches, no myalgias, no nausea, no rash, no sore throat, no vomiting and no wheezing        Prior to Admission Medications   Prescriptions Last Dose Informant Patient Reported? Taking?   Multiple Vitamin (MULTIVITAMIN) capsule  Self Yes Yes   Sig: Take 1 capsule by mouth daily   Omega-3 Fatty Acids (FISH OIL) 1200 MG CAPS   Yes Yes   Sig: Take 4 each by mouth daily    amLODIPine (NORVASC) 5 mg tablet   No Yes   Sig: Take 1 tablet (5 mg total) by mouth daily   aspirin (ECOTRIN LOW STRENGTH) 81 mg EC tablet   Yes Yes   Sig: Take 324 mg by mouth daily    clopidogrel (PLAVIX) 75 mg tablet   Yes Yes   Sig: Take 75 mg by mouth daily   ketoconazole (NIZORAL) 2 % cream   No Yes   Sig: Apply topically daily   levothyroxine 25 mcg tablet   No Yes   Sig: Take 1 tablet (25 mcg total) by mouth daily   meloxicam (Mobic) 15 mg  tablet   No Yes   Sig: Take 1 tablet (15 mg total) by mouth daily   silver sulfadiazine (SILVADENE,SSD) 1 % cream   No Yes   Sig: Apply topically daily   simvastatin (ZOCOR) 40 mg tablet   No Yes   Sig: TAKE 1 TABLET BY MOUTH ONCE DAILY AT BEDTIME      Facility-Administered Medications: None       Past Medical History:   Diagnosis Date    Chronic ischemic heart disease     Coronary artery disease     hx cabg x4 2007    Hearing loss     Hyperlipidemia     Hypertension     Hypokalemia     Hypothyroid     OA (osteoarthritis)     left knee    Prediabetes     S/P AVR     2007    Type 2 diabetes mellitus (HCC)     Wears dentures     full upper    Wears glasses        Past Surgical History:   Procedure Laterality Date    AORTIC VALVE REPLACEMENT      BACK SURGERY      lumbar     CATARACT EXTRACTION Bilateral     CORONARY ARTERY BYPASS GRAFT      x4    2007    KNEE ARTHROSCOPY Bilateral     LA ARTHRP KNE CONDYLE&PLATU MEDIAL&LAT COMPARTMENTS Left 1/17/2018    Procedure: ARTHROPLASTY KNEE TOTAL;  Surgeon: Ajit Smith MD;  Location: [facility] OR;  Service: Orthopedics    SHOULDER ARTHROSCOPY Right        Family History   Problem Relation Age of Onset    Cancer Mother      I have reviewed and agree with the history as documented.    E-Cigarette/Vaping    E-Cigarette Use Never User      E-Cigarette/Vaping Substances     Social History     Tobacco Use    Smoking status: Never    Smokeless tobacco: Never   Vaping Use    Vaping status: Never Used   Substance Use Topics    Alcohol use: No    Drug use: No       Review of Systems   Constitutional:  Negative for chills, fatigue and fever.   HENT:  Negative for congestion, ear pain, hearing loss, sore throat, trouble swallowing and voice change.    Eyes:  Negative for pain and discharge.   Respiratory:  Positive for shortness of breath. Negative for cough and wheezing.    Cardiovascular:  Negative for chest pain and palpitations.   Gastrointestinal:  Negative for abdominal pain, blood  in stool, constipation, diarrhea, nausea and vomiting.   Genitourinary:  Negative for dysuria, flank pain, frequency and hematuria.   Musculoskeletal:  Negative for joint swelling, myalgias, neck pain and neck stiffness.   Skin:  Negative for rash and wound.   Neurological:  Positive for weakness. Negative for dizziness, seizures, syncope, facial asymmetry and headaches.   Psychiatric/Behavioral:  Negative for hallucinations, self-injury and suicidal ideas.    All other systems reviewed and are negative.      Physical Exam  Physical Exam  Vitals and nursing note reviewed.   Constitutional:       General: He is not in acute distress.     Appearance: He is well-developed.   HENT:      Head: Normocephalic and atraumatic.      Right Ear: External ear normal.      Left Ear: External ear normal.   Eyes:      General: No scleral icterus.        Right eye: No discharge.         Left eye: No discharge.      Extraocular Movements: Extraocular movements intact.      Conjunctiva/sclera: Conjunctivae normal.   Cardiovascular:      Rate and Rhythm: Normal rate and regular rhythm.      Heart sounds: Normal heart sounds. No murmur heard.  Pulmonary:      Effort: Pulmonary effort is normal.      Breath sounds: Normal breath sounds. No wheezing or rales.   Abdominal:      General: Bowel sounds are normal. There is no distension.      Palpations: Abdomen is soft.      Tenderness: There is no abdominal tenderness. There is no guarding or rebound.   Musculoskeletal:         General: No deformity. Normal range of motion.      Cervical back: Normal range of motion and neck supple.   Skin:     General: Skin is warm and dry.      Findings: No rash.   Neurological:      General: No focal deficit present.      Mental Status: He is alert and oriented to person, place, and time.      Cranial Nerves: No cranial nerve deficit.   Psychiatric:         Mood and Affect: Mood normal.         Behavior: Behavior normal.         Thought Content: Thought  content normal.         Judgment: Judgment normal.         Vital Signs  ED Triage Vitals [01/06/24 0850]   Temperature Pulse Respirations Blood Pressure SpO2   (!) 97.3 °F (36.3 °C) (!) 53 16 (!) 187/100 97 %      Temp Source Heart Rate Source Patient Position - Orthostatic VS BP Location FiO2 (%)   Temporal Monitor Lying Left arm --      Pain Score       No Pain           Vitals:    01/06/24 0915 01/06/24 0930 01/06/24 0945 01/06/24 1000   BP: (!) 173/103 (!) 173/103 153/95 149/94   Pulse: (!) 49 (!) 47 (!) 46 (!) 46   Patient Position - Orthostatic VS:             Visual Acuity      ED Medications  Medications - No data to display    Diagnostic Studies  Results Reviewed       Procedure Component Value Units Date/Time    HS Troponin I 2hr [[id number]]  (Normal) Collected: 01/06/24 1013    Lab Status: Final result Specimen: Blood from Arm, Right Updated: 01/06/24 1045     hs TnI 2hr 18 ng/L      Delta 2hr hsTnI -4 ng/L     B-Type Natriuretic Peptide(BNP) [[id number]]  (Normal) Collected: 01/06/24 0858    Lab Status: Final result Specimen: Blood from Arm, Right Updated: 01/06/24 1008     BNP 99 pg/mL     TSH, 3rd generation with Free T4 reflex [[id number]]  (Abnormal) Collected: 01/06/24 0858    Lab Status: Final result Specimen: Blood from Arm, Right Updated: 01/06/24 1004     TSH 3RD GENERATON 5.832 uIU/mL     T4, free [[id number]] Collected: 01/06/24 0858    Lab Status: In process Specimen: Blood from Arm, Right Updated: 01/06/24 1004    Comprehensive metabolic panel [[id number]]  (Abnormal) Collected: 01/06/24 0858    Lab Status: Final result Specimen: Blood from Arm, Right Updated: 01/06/24 0935     Sodium 137 mmol/L      Potassium 4.2 mmol/L      Chloride 103 mmol/L      CO2 27 mmol/L      ANION GAP 7 mmol/L      BUN 15 mg/dL      Creatinine 1.07 mg/dL      Glucose 169 mg/dL      Calcium 9.0 mg/dL      AST 34 U/L      ALT 42 U/L      Alkaline Phosphatase 47 U/L      Total Protein 7.2 g/dL      Albumin 4.3 g/dL       Total Bilirubin 0.84 mg/dL      eGFR 64 ml/min/1.73sq m     Narrative:      [employer] guidelines for Chronic Kidney Disease (CKD):     Stage 1 with normal or high GFR (GFR > 90 mL/min/1.73 square meters)    Stage 2 Mild CKD (GFR = 60-89 mL/min/1.73 square meters)    Stage 3A Moderate CKD (GFR = 45-59 mL/min/1.73 square meters)    Stage 3B Moderate CKD (GFR = 30-44 mL/min/1.73 square meters)    Stage 4 Severe CKD (GFR = 15-29 mL/min/1.73 square meters)    Stage 5 End Stage CKD (GFR <15 mL/min/1.73 square meters)  Note: GFR calculation is accurate only with a steady state creatinine    Lipase [[id number]]  (Normal) Collected: 01/06/24 0858    Lab Status: Final result Specimen: Blood from Arm, Right Updated: 01/06/24 0935     Lipase 25 u/L     HS Troponin 0hr (reflex protocol) [[id number]]  (Normal) Collected: 01/06/24 0858    Lab Status: Final result Specimen: Blood from Arm, Right Updated: 01/06/24 0932     hs TnI 0hr 22 ng/L     Lactic acid, plasma (w/reflex if result > 2.0) [[id number]]  (Normal) Collected: 01/06/24 0858    Lab Status: Final result Specimen: Blood from Arm, Right Updated: 01/06/24 0924     LACTIC ACID 1.3 mmol/L     Narrative:      Result may be elevated if tourniquet was used during collection.    D-Dimer [[id number]]  (Normal) Collected: 01/06/24 0858    Lab Status: Final result Specimen: Blood from Arm, Right Updated: 01/06/24 0923     D-Dimer, Quant 0.43 ug/ml FEU     Narrative:      In the evaluation for possible pulmonary embolism, in the appropriate (Well's Score of 4 or less) patient, the age adjusted d-dimer cutoff for this patient can be calculated as:    Age x 0.01 (in ug/mL) for Age-adjusted D-dimer exclusion threshold for a patient over 50 years.    Protime-INR [[id number]]  (Normal) Collected: 01/06/24 0858    Lab Status: Final result Specimen: Blood from Arm, Right Updated: 01/06/24 0920     Protime 12.9 seconds      INR 0.94    APTT [[id number]]  (Normal)  Collected: 01/06/24 0858    Lab Status: Final result Specimen: Blood from Arm, Right Updated: 01/06/24 0920     PTT 29 seconds     CBC and differential [[id number]] Collected: 01/06/24 0858    Lab Status: Final result Specimen: Blood from Arm, Right Updated: 01/06/24 0903     WBC 8.03 Thousand/uL      RBC 4.98 Million/uL      Hemoglobin 15.3 g/dL      Hematocrit 43.1 %      MCV 87 fL      MCH 30.7 pg      MCHC 35.5 g/dL      RDW 13.2 %      MPV 10.3 fL      Platelets 167 Thousands/uL      nRBC 0 /100 WBCs      Neutrophils Relative 48 %      Immat GRANS % 1 %      Lymphocytes Relative 40 %      Monocytes Relative 10 %      Eosinophils Relative 1 %      Basophils Relative 0 %      Neutrophils Absolute 3.89 Thousands/µL      Immature Grans Absolute 0.05 Thousand/uL      Lymphocytes Absolute 3.20 Thousands/µL      Monocytes Absolute 0.77 Thousand/µL      Eosinophils Absolute 0.09 Thousand/µL      Basophils Absolute 0.03 Thousands/µL                    XR chest 1 view portable   ED Interpretation by Joaquim Stacy MD (01/06 0913)   NAD                 Procedures  ECG 12 Lead Documentation Only    Date/Time: 1/6/2024 9:02 AM    Performed by: Joaquim Stacy MD  Authorized by: Joaquim Stacy MD    ECG reviewed by me, the ED Provider: yes    Patient location:  ED  Rate:     ECG rate:  50  Rhythm:     Rhythm: sinus rhythm    Ectopy:     Ectopy: PAC    QRS:     QRS axis:  Normal  Comments:      No significant change from EKG from 2017.  Heart rate then was 46.           ED Course  ED Course as of 01/06/24 1050   Sat Jan 06, 2024   0857 Previous EKG showed a heart rate in the high 40s to low 50s.   0924 D-Dimer, Quant: 0.43   0926 LACTIC ACID: 1.3   0933 hs TnI 0hr: 22   1009 BNP: 99   1048 Discussed with patient and daughter about her lab results.  Will follow-up with PCP concerning his TSH level.   1050 Heart rate has consistently been in the high 40s low 50s.  Blood pressure is normal.  Discussed with patient and  daughter.  Daughter states this is not new.                                             Medical Decision Making  Amount and/or Complexity of Data Reviewed  Labs: ordered. Decision-making details documented in ED Course.  Radiology: ordered and independent interpretation performed. Decision-making details documented in ED Course.  ECG/medicine tests: ordered and independent interpretation performed. Decision-making details documented in ED Course.  Discussion of management or test interpretation with external provider(s): Differential diagnosis includes but not limited to STEMI, NSTEMI, PE, pneumonia, pneumothorax, musculoskeletal chest pain, costochondritis, gastritis, cholelithiasis, contusion, strain             Disposition  Final diagnoses:   Bradycardia   Elevated TSH     Time reflects when diagnosis was documented in both MDM as applicable and the Disposition within this note       Time User Action Codes Description Comment    1/6/2024 10:05 AM Joaquim Stacy Add [R00.1] Bradycardia     1/6/2024 10:05 AM Joaquim Stacy Add [R79.89] Elevated TSH           ED Disposition       ED Disposition   Discharge    Condition   Stable    Date/Time   Sat Jan 6, 2024 10:48 AM    Comment   Felix Ochoa discharge to home/self care.                   Follow-up Information    None         Patient's Medications   Discharge Prescriptions    No medications on file       No discharge procedures on file.    PDMP Review         Value Time User    PDMP Reviewed  Yes 7/10/2021 10:10 AM Shawn Cheng MD            ED Provider  Electronically Signed by             Joaquim Stacy MD  01/06/24 1048       Joaquim Stacy MD  01/06/24 1048       Joaquim Stacy MD  01/06/24 1057

## 2024-01-08 ENCOUNTER — OFFICE VISIT (OUTPATIENT)
Dept: FAMILY MEDICINE CLINIC | Facility: CLINIC | Age: 84
End: 2024-01-08
Payer: MEDICARE

## 2024-01-08 VITALS
DIASTOLIC BLOOD PRESSURE: 76 MMHG | SYSTOLIC BLOOD PRESSURE: 128 MMHG | WEIGHT: 180 LBS | BODY MASS INDEX: 33.13 KG/M2 | HEIGHT: 62 IN

## 2024-01-08 DIAGNOSIS — I10 ESSENTIAL HYPERTENSION: Chronic | ICD-10-CM

## 2024-01-08 DIAGNOSIS — I25.810 CORONARY ARTERY DISEASE INVOLVING CORONARY BYPASS GRAFT OF NATIVE HEART WITHOUT ANGINA PECTORIS: Chronic | ICD-10-CM

## 2024-01-08 DIAGNOSIS — N18.32 STAGE 3B CHRONIC KIDNEY DISEASE (HCC): ICD-10-CM

## 2024-01-08 DIAGNOSIS — R00.1 BRADYCARDIA: Primary | ICD-10-CM

## 2024-01-08 DIAGNOSIS — E03.9 ACQUIRED HYPOTHYROIDISM: ICD-10-CM

## 2024-01-08 DIAGNOSIS — N18.31 TYPE 2 DIABETES MELLITUS WITH STAGE 3A CHRONIC KIDNEY DISEASE, WITHOUT LONG-TERM CURRENT USE OF INSULIN (HCC): ICD-10-CM

## 2024-01-08 DIAGNOSIS — E11.22 TYPE 2 DIABETES MELLITUS WITH STAGE 3A CHRONIC KIDNEY DISEASE, WITHOUT LONG-TERM CURRENT USE OF INSULIN (HCC): ICD-10-CM

## 2024-01-08 LAB
ATRIAL RATE: 51 BPM
P AXIS: 21 DEGREES
PR INTERVAL: 198 MS
QRS AXIS: -18 DEGREES
QRSD INTERVAL: 120 MS
QT INTERVAL: 504 MS
QTC INTERVAL: 464 MS
T WAVE AXIS: 161 DEGREES
VENTRICULAR RATE: 51 BPM

## 2024-01-08 PROCEDURE — 99214 OFFICE O/P EST MOD 30 MIN: CPT | Performed by: FAMILY MEDICINE

## 2024-01-08 RX ORDER — LEVOTHYROXINE SODIUM 0.05 MG/1
25 TABLET ORAL DAILY
Qty: 90 TABLET | Refills: 3 | Status: SHIPPED | OUTPATIENT
Start: 2024-01-08

## 2024-01-08 NOTE — ASSESSMENT & PLAN NOTE
Overall control has been adequate.  Continue to watch starch in diet and push daily activity  Lab Results   Component Value Date    HGBA1C 7.1 (A) 12/04/2023

## 2024-01-08 NOTE — PROGRESS NOTES
Name: Felix Ochoa      : 1940      MRN: [id number]  Encounter Provider: Milad Payne MD  Encounter Date: 2024   Encounter department: [facility]    Assessment & Plan     1. Bradycardia  Assessment & Plan:  Patient asymptomatic with this.  There is may be helped some with the adjustment of the thyroid medication.  Would just follow at this time.  Reviewed ER report and reconciled medication      2. Acquired hypothyroidism  Comments:  Overall stable continue current regimen  Assessment & Plan:  Will increase thyroid supplement to 50 mcg taken daily    Orders:  -     levothyroxine 50 mcg tablet; Take 0.5 tablets (25 mcg total) by mouth daily    3. Type 2 diabetes mellitus with stage 3a chronic kidney disease, without long-term current use of insulin (HCC)  Assessment & Plan:  Overall control has been adequate.  Continue to watch starch in diet and push daily activity  Lab Results   Component Value Date    HGBA1C 7.1 (A) 2023         4. Stage 3b chronic kidney disease (HCC)    5. Essential hypertension  Assessment & Plan:  Overall stable, continue current regimen      6. Coronary artery disease involving coronary bypass graft of native heart without angina pectoris  Assessment & Plan:  Appears stable, continue current regimen and follow-up.  Push activity as tolerated             Subjective        Patient has history of hypertension, hypothyroidism, coronary artery disease, diabetes mellitus adult and degenerative arthritis.  Had gone to the ER for problem with bradycardia.  Patient reports she was not short of breath although has been tired recently.  No medication was changed and patient claims feels okay at this time.  Other workup was negative with exception of thyroid levels noted to be low      Review of Systems   Constitutional:  Positive for fatigue. Negative for activity change, appetite change, chills, fever and unexpected weight change.  "  HENT:  Negative for congestion, rhinorrhea and trouble swallowing.    Eyes:  Negative for visual disturbance.   Respiratory:  Negative for apnea, cough, chest tightness and shortness of breath.    Cardiovascular:  Negative for chest pain, palpitations and leg swelling.   Gastrointestinal:  Negative for abdominal distention, abdominal pain, constipation and diarrhea.   Endocrine: Negative for polyuria (Nocturia x 1).   Genitourinary:  Negative for difficulty urinating.   Musculoskeletal:  Positive for arthralgias and back pain. Negative for myalgias.   Skin:  Negative for rash.   Allergic/Immunologic: Negative for environmental allergies.   Neurological:  Negative for dizziness, weakness, light-headedness, numbness and headaches.   Hematological:  Negative for adenopathy.   Psychiatric/Behavioral:  Negative for agitation and sleep disturbance.        Current Outpatient Medications on File Prior to Visit   Medication Sig    amLODIPine (NORVASC) 5 mg tablet Take 1 tablet (5 mg total) by mouth daily    aspirin (ECOTRIN LOW STRENGTH) 81 mg EC tablet Take 324 mg by mouth daily     clopidogrel (PLAVIX) 75 mg tablet Take 75 mg by mouth daily    ketoconazole (NIZORAL) 2 % cream Apply topically daily    meloxicam (Mobic) 15 mg tablet Take 1 tablet (15 mg total) by mouth daily    Multiple Vitamin (MULTIVITAMIN) capsule Take 1 capsule by mouth daily    Omega-3 Fatty Acids (FISH OIL) 1200 MG CAPS Take 4 each by mouth daily     silver sulfadiazine (SILVADENE,SSD) 1 % cream Apply topically daily    simvastatin (ZOCOR) 40 mg tablet TAKE 1 TABLET BY MOUTH ONCE DAILY AT BEDTIME    [DISCONTINUED] levothyroxine 25 mcg tablet Take 1 tablet (25 mcg total) by mouth daily       Objective     Blood Pressure 128/76   Height 5' 2\" (1.575 m)   Weight 81.6 kg (180 lb)   Body Mass Index 32.92 kg/m²     Physical Exam  Vitals and nursing note reviewed.   Constitutional:       Appearance: Normal appearance. He is well-developed.   HENT:      " Head: Normocephalic.      Nose: Nose normal.      Mouth/Throat:      Mouth: Mucous membranes are moist.   Eyes:      Conjunctiva/sclera: Conjunctivae normal.   Neck:      Thyroid: No thyromegaly.      Vascular: No carotid bruit.   Cardiovascular:      Rate and Rhythm: Normal rate and regular rhythm.      Heart sounds: Murmur (Soft systolic murmur at left sternal border.  Heart rate is 54) heard.   Pulmonary:      Effort: Pulmonary effort is normal.      Breath sounds: Normal breath sounds.   Abdominal:      General: Abdomen is flat. There is no distension.      Palpations: Abdomen is soft. There is no mass.      Tenderness: There is no abdominal tenderness.   Musculoskeletal:         General: Normal range of motion.      Cervical back: Normal range of motion and neck supple. No tenderness.      Right lower leg: No edema.      Left lower leg: No edema.   Lymphadenopathy:      Cervical: No cervical adenopathy.   Skin:     General: Skin is warm and dry.      Findings: No rash.   Neurological:      Mental Status: He is alert and oriented to person, place, and time.      Coordination: Coordination normal.      Gait: Gait normal.      Deep Tendon Reflexes: Reflexes abnormal (Reflexes diminished).   Psychiatric:         Mood and Affect: Mood normal.       Milad Payne MD

## 2024-01-08 NOTE — ASSESSMENT & PLAN NOTE
Patient asymptomatic with this.  There is may be helped some with the adjustment of the thyroid medication.  Would just follow at this time.  Reviewed ER report and reconciled medication

## 2024-01-08 NOTE — PATIENT INSTRUCTIONS
Reviewed ER report and overall seems to be doing well.  Heart rate today is 54 but is asymptomatic with this.  It was noted that the thyroid levels were low and will increase the thyroid supplement to 50 mcg which may help a little bit with the fatigue.  Continue other meds as is

## 2024-01-24 ENCOUNTER — TELEPHONE (OUTPATIENT)
Dept: ADMINISTRATIVE | Facility: OTHER | Age: 84
End: 2024-01-24

## 2024-01-24 NOTE — TELEPHONE ENCOUNTER
----- Message from Urszula Salinas sent at 1/24/2024  8:12 AM EST -----  Regarding: quality metric update  01/24/24 8:12 AM    Hello, our patient above has had Urine Albumin/Creatinine Ratio completed/performed. Please assist in updating the patient chart by pulling the Care Everywhere (CE) document. The date of service is 12/20/23.     Thank you,  Urszula Salinas  [facility]

## 2024-01-25 NOTE — TELEPHONE ENCOUNTER
Upon review of the In Basket request we were able to locate, review, and update the patient chart as requested for Microalbumin Creatinine Urine Ratio OR Albumin Creatinine Urine Ratio .    Any additional questions or concerns should be emailed to the Practice Liaisons via the appropriate education email address, please do not reply via In Basket.    Thank you  Alana Avalos

## 2024-02-10 ENCOUNTER — HOSPITAL ENCOUNTER (EMERGENCY)
Facility: HOSPITAL | Age: 84
Discharge: HOME | End: 2024-02-10
Payer: MEDICARE

## 2024-02-10 VITALS
RESPIRATION RATE: 18 BRPM | SYSTOLIC BLOOD PRESSURE: 151 MMHG | DIASTOLIC BLOOD PRESSURE: 93 MMHG | OXYGEN SATURATION: 98 % | WEIGHT: 180.78 LBS | TEMPERATURE: 98.2 F | HEIGHT: 62 IN | HEART RATE: 65 BPM | BODY MASS INDEX: 33.27 KG/M2

## 2024-02-10 DIAGNOSIS — H61.21 IMPACTED CERUMEN OF RIGHT EAR: Primary | ICD-10-CM

## 2024-02-10 DIAGNOSIS — H60.90 OTITIS EXTERNA: ICD-10-CM

## 2024-02-10 PROCEDURE — 99282 EMERGENCY DEPT VISIT SF MDM: CPT

## 2024-02-10 PROCEDURE — 99284 EMERGENCY DEPT VISIT MOD MDM: CPT | Performed by: EMERGENCY MEDICINE

## 2024-02-10 RX ORDER — CIPROFLOXACIN AND DEXAMETHASONE 3; 1 MG/ML; MG/ML
4 SUSPENSION/ DROPS AURICULAR (OTIC) 2 TIMES DAILY
Qty: 7.5 ML | Refills: 0 | Status: SHIPPED | OUTPATIENT
Start: 2024-02-10 | End: 2024-02-10 | Stop reason: HOSPADM

## 2024-02-10 RX ORDER — NEOMYCIN SULFATE, POLYMYXIN B SULFATE AND HYDROCORTISONE 10; 3.5; 1 MG/ML; MG/ML; [USP'U]/ML
4 SUSPENSION/ DROPS AURICULAR (OTIC) 3 TIMES DAILY
Qty: 10 ML | Refills: 0 | Status: SHIPPED | OUTPATIENT
Start: 2024-02-10 | End: 2024-02-17

## 2024-02-10 RX ORDER — AMOXICILLIN AND CLAVULANATE POTASSIUM 875; 125 MG/1; MG/1
1 TABLET, FILM COATED ORAL EVERY 12 HOURS
Qty: 14 TABLET | Refills: 0 | Status: SHIPPED | OUTPATIENT
Start: 2024-02-10 | End: 2024-02-17

## 2024-02-10 NOTE — ED ATTENDING ATTESTATION
2/10/2024  I, Nimisha Gaona DO, discussed the patient with the PA and agree with the findings, Plan of Care, and MDM as documented in the PA's note, except where noted. All available labs and Radiology studies were reviewed.  I was present for key portions of any procedure(s) performed by the resident/non-physician practitioner and I was immediately available to provide assistance.       At this point I agree with the current assessment done in the Emergency Department.      Critical Care Time  Procedures

## 2024-02-10 NOTE — ED PROVIDER NOTES
History  Chief Complaint   Patient presents with    Earache     Pt reports he got water in ear 4 days ago in right side and now cannot hear out of that ear.      Patient is an 83-year-old male who presents with right ear complaint.  States that his right ear feels blocked and uncomfortable.  States it has been going on for a few days.  States that he did get water in his ear while taking a shower and believes this attributed to it.  Use Q-tips at home.  Has also tried to clean his ear out with Q-tips and over-the-counter eardrop solutions.  Denies any fevers chills, cough congestion, sore throat.      Earache  Location:  Right  Behind ear:  No abnormality  Quality:  Sore  Severity:  Mild  Duration:  4 days  Timing:  Constant  Progression:  Worsening  Chronicity:  New  Context: water in ear    Relieved by:  Nothing  Worsened by:  Nothing  Ineffective treatments:  OTC medications  Associated symptoms: no congestion, no cough, no ear discharge, no fever, no headaches, no neck pain and no sore throat        Prior to Admission Medications   Prescriptions Last Dose Informant Patient Reported? Taking?   Multiple Vitamin (MULTIVITAMIN) capsule  Self Yes No   Sig: Take 1 capsule by mouth daily   Omega-3 Fatty Acids (FISH OIL) 1200 MG CAPS   Yes No   Sig: Take 4 each by mouth daily    amLODIPine (NORVASC) 5 mg tablet   No No   Sig: Take 1 tablet (5 mg total) by mouth daily   aspirin (ECOTRIN LOW STRENGTH) 81 mg EC tablet   Yes No   Sig: Take 324 mg by mouth daily    clopidogrel (PLAVIX) 75 mg tablet   Yes No   Sig: Take 75 mg by mouth daily   ketoconazole (NIZORAL) 2 % cream   No No   Sig: Apply topically daily   levothyroxine 50 mcg tablet   No No   Sig: Take 0.5 tablets (25 mcg total) by mouth daily   meloxicam (Mobic) 15 mg tablet   No No   Sig: Take 1 tablet (15 mg total) by mouth daily   silver sulfadiazine (SILVADENE,SSD) 1 % cream   No No   Sig: Apply topically daily   simvastatin (ZOCOR) 40 mg tablet   No No   Sig:  TAKE 1 TABLET BY MOUTH ONCE DAILY AT BEDTIME      Facility-Administered Medications: None       Past Medical History:   Diagnosis Date    Chronic ischemic heart disease     Coronary artery disease     hx cabg x4 2007    Hearing loss     Hyperlipidemia     Hypertension     Hypokalemia     Hypothyroid     OA (osteoarthritis)     left knee    Prediabetes     S/P AVR     2007    Type 2 diabetes mellitus (HCC)     Wears dentures     full upper    Wears glasses        Past Surgical History:   Procedure Laterality Date    AORTIC VALVE REPLACEMENT      BACK SURGERY      lumbar     CATARACT EXTRACTION Bilateral     CORONARY ARTERY BYPASS GRAFT      x4    2007    KNEE ARTHROSCOPY Bilateral     IL ARTHRP KNE CONDYLE&PLATU MEDIAL&LAT COMPARTMENTS Left 1/17/2018    Procedure: ARTHROPLASTY KNEE TOTAL;  Surgeon: Ajit Smith MD;  Location: [facility] OR;  Service: Orthopedics    SHOULDER ARTHROSCOPY Right        Family History   Problem Relation Age of Onset    Cancer Mother      I have reviewed and agree with the history as documented.    E-Cigarette/Vaping    E-Cigarette Use Never User      E-Cigarette/Vaping Substances    Nicotine No     THC No     CBD No     Flavoring No     Other No     Unknown No      Social History     Tobacco Use    Smoking status: Never    Smokeless tobacco: Never   Vaping Use    Vaping status: Never Used   Substance Use Topics    Alcohol use: No    Drug use: No       Review of Systems   Constitutional:  Negative for fever.   HENT:  Positive for ear pain. Negative for congestion, ear discharge and sore throat.    Respiratory:  Negative for cough.    Musculoskeletal:  Negative for neck pain.   Neurological:  Negative for headaches.   All other systems reviewed and are negative.      Physical Exam  Physical Exam  Vitals and nursing note reviewed.   Constitutional:       General: He is not in acute distress.     Appearance: He is well-developed.   HENT:      Head: Normocephalic and atraumatic.      Right Ear:  External ear normal. Swelling present. No tenderness. There is impacted cerumen. No mastoid tenderness. No hemotympanum. Tympanic membrane is erythematous and bulging.      Left Ear: Tympanic membrane, ear canal and external ear normal.      Ears:      Comments: The patient's right ear canal was obstructed with cerumen however after being cleansed the ear canal was erythematous slightly swollen by the TM, and is bulging and slightly erythematous.  Eyes:      Extraocular Movements: Extraocular movements intact.      Pupils: Pupils are equal, round, and reactive to light.   Cardiovascular:      Rate and Rhythm: Normal rate and regular rhythm.   Pulmonary:      Effort: Pulmonary effort is normal. No respiratory distress.   Musculoskeletal:      Cervical back: Normal range of motion.   Skin:     General: Skin is warm and dry.      Capillary Refill: Capillary refill takes less than 2 seconds.   Neurological:      General: No focal deficit present.      Mental Status: He is alert and oriented to person, place, and time.      Gait: Gait is intact.   Psychiatric:         Behavior: Behavior normal.         Vital Signs  ED Triage Vitals [02/10/24 0911]   Temperature Pulse Respirations Blood Pressure SpO2   98.2 °F (36.8 °C) 65 18 (!) 187/108 98 %      Temp src Heart Rate Source Patient Position - Orthostatic VS BP Location FiO2 (%)   -- Monitor Sitting Left arm --      Pain Score       No Pain           Vitals:    02/10/24 0911   BP: (!) 187/108   Pulse: 65   Patient Position - Orthostatic VS: Sitting         Visual Acuity      ED Medications  Medications - No data to display    Diagnostic Studies  Results Reviewed       None                   No orders to display              Procedures  Ear cerumen removal    Date/Time: 2/10/2024 9:50 AM    Performed by: Delano Rodríguez PA-C  Authorized by: Delano Rodríguez PA-C  Universal Protocol:  Consent: Verbal consent obtained.  Risks and benefits: risks, benefits and  alternatives were discussed  Consent given by: patient  Patient identity confirmed: verbally with patient    Patient location:  ED  Indications / Diagnosis:  Impaction  Procedure details:     Location:  R ear    Procedure type: curette      Procedure type comment:  And irrigation    Approach:  External  Post-procedure details:     Complication:  None    Hearing quality:  Improved    Patient tolerance of procedure:  Tolerated well, no immediate complications  Comments:      Ear wick placed            ED Course                                             Medical Decision Making  Patient is an 83-year-old male who presents with right ear complaint.  States that his right ear feels blocked and uncomfortable.  States it has been going on for a few days.  States that he did get water in his ear while taking a shower and believes this attributed to it.  Use Q-tips at home.  Has also tried to clean his ear out with Q-tips and over-the-counter eardrop solutions.  Denies any fevers chills, cough congestion, sore throat.  The patient's right ear canal was obstructed with cerumen however after being cleansed the ear canal was erythematous slightly swollen by the TM, and is bulging and slightly erythematous.  Ear wick placed    Patient will be placed on oral antibiotics due to the erythema and bulging of the TM on the right side and the partially obstructed view.  Placed on eardrops.  Expressed understanding was in agreement treatment plan    Risk  Prescription drug management.             Disposition  Final diagnoses:   Impacted cerumen of right ear   Otitis externa     Time reflects when diagnosis was documented in both MDM as applicable and the Disposition within this note       Time User Action Codes Description Comment    2/10/2024  9:46 AM Delano Rodríguez [H61.21] Impacted cerumen of right ear     2/10/2024  9:46 AM Delano Rodríguez [H60.90] Otitis externa           ED Disposition       ED Disposition   Discharge     Condition   Stable    Date/Time   Sat Feb 10, 2024  9:46 AM    Comment   Felix DE PAZ Don discharge to home/self care.                   Follow-up Information    None         Patient's Medications   Discharge Prescriptions    AMOXICILLIN-CLAVULANATE (AUGMENTIN) 875-125 MG PER TABLET    Take 1 tablet by mouth every 12 (twelve) hours for 7 days       Start Date: 2/10/2024 End Date: 2/17/2024       Order Dose: 1 tablet       Quantity: 14 tablet    Refills: 0    CIPROFLOXACIN-DEXAMETHASONE (CIPRODEX) OTIC SUSPENSION    Administer 4 drops to the right ear 2 (two) times a day for 10 days       Start Date: 2/10/2024 End Date: 2/20/2024       Order Dose: 4 drops       Quantity: 7.5 mL    Refills: 0       No discharge procedures on file.    PDMP Review         Value Time User    PDMP Reviewed  Yes 7/10/2021 10:10 AM Shawn Cheng MD            ED Provider  Electronically Signed by             Delano Rodríguez PA-C  02/10/24 0951

## 2024-02-20 DIAGNOSIS — E78.00 HYPERCHOLESTEROLEMIA: ICD-10-CM

## 2024-02-20 RX ORDER — SIMVASTATIN 40 MG
40 TABLET ORAL
Qty: 90 TABLET | Refills: 1 | Status: SHIPPED | OUTPATIENT
Start: 2024-02-20

## 2024-02-20 NOTE — TELEPHONE ENCOUNTER
Pt called - lost his bottle of simavastin can we please reorder    Medication: simvastatin    Dose/Frequency: 40mg once a day    Quantity: 90    Pharmacy: [facility]    Office:   [x] PCP/Provider -   [] Speciality/Provider -     Does the patient have enough for 3 days?   [x] Yes   [] No - Send as HP to POD     Pt has exactly 3 days left

## 2024-03-20 DIAGNOSIS — R74.8 ABNORMAL LIVER ENZYMES: Primary | ICD-10-CM

## 2024-04-01 ENCOUNTER — RA CDI HCC (OUTPATIENT)
Dept: OTHER | Facility: HOSPITAL | Age: 84
End: 2024-04-01

## 2024-04-17 DIAGNOSIS — E03.9 ACQUIRED HYPOTHYROIDISM: ICD-10-CM

## 2024-04-17 RX ORDER — LEVOTHYROXINE SODIUM 0.05 MG/1
25 TABLET ORAL DAILY
Qty: 90 TABLET | Refills: 3 | Status: SHIPPED | OUTPATIENT
Start: 2024-04-17 | End: 2024-04-25 | Stop reason: SDUPTHER

## 2024-04-25 ENCOUNTER — OFFICE VISIT (OUTPATIENT)
Dept: FAMILY MEDICINE CLINIC | Facility: CLINIC | Age: 84
End: 2024-04-25
Payer: MEDICARE

## 2024-04-25 VITALS
SYSTOLIC BLOOD PRESSURE: 122 MMHG | OXYGEN SATURATION: 93 % | WEIGHT: 167.8 LBS | HEIGHT: 62 IN | BODY MASS INDEX: 30.88 KG/M2 | DIASTOLIC BLOOD PRESSURE: 82 MMHG | HEART RATE: 42 BPM

## 2024-04-25 DIAGNOSIS — E03.9 ACQUIRED HYPOTHYROIDISM: ICD-10-CM

## 2024-04-25 DIAGNOSIS — I10 ESSENTIAL HYPERTENSION: Primary | Chronic | ICD-10-CM

## 2024-04-25 DIAGNOSIS — N18.31 TYPE 2 DIABETES MELLITUS WITH STAGE 3A CHRONIC KIDNEY DISEASE, WITHOUT LONG-TERM CURRENT USE OF INSULIN (HCC): ICD-10-CM

## 2024-04-25 DIAGNOSIS — I25.810 CORONARY ARTERY DISEASE INVOLVING CORONARY BYPASS GRAFT OF NATIVE HEART WITHOUT ANGINA PECTORIS: Chronic | ICD-10-CM

## 2024-04-25 DIAGNOSIS — E11.22 TYPE 2 DIABETES MELLITUS WITH STAGE 3A CHRONIC KIDNEY DISEASE, WITHOUT LONG-TERM CURRENT USE OF INSULIN (HCC): ICD-10-CM

## 2024-04-25 LAB — SL AMB POCT HEMOGLOBIN AIC: 7.1 (ref ?–6.5)

## 2024-04-25 PROCEDURE — 99214 OFFICE O/P EST MOD 30 MIN: CPT | Performed by: FAMILY MEDICINE

## 2024-04-25 PROCEDURE — 83036 HEMOGLOBIN GLYCOSYLATED A1C: CPT | Performed by: FAMILY MEDICINE

## 2024-04-25 RX ORDER — LEVOTHYROXINE SODIUM 0.05 MG/1
50 TABLET ORAL DAILY
Qty: 90 TABLET | Refills: 3 | Status: SHIPPED | OUTPATIENT
Start: 2024-04-25

## 2024-04-25 RX ORDER — LEVOTHYROXINE SODIUM 0.05 MG/1
25 TABLET ORAL DAILY
Qty: 90 TABLET | Refills: 3 | Status: SHIPPED | OUTPATIENT
Start: 2024-04-25 | End: 2024-04-25 | Stop reason: SDUPTHER

## 2024-04-25 NOTE — PATIENT INSTRUCTIONS
Overall doing well but there has been some confusion with the levothyroxine.  I resent the levothyroxine and 50 mcg to take 1 tablet daily.  Continue all meds as is and push activity as tolerated within reason.  A1c doing okay at 7.1 and watch the sweets and starch in the diet

## 2024-04-25 NOTE — ASSESSMENT & PLAN NOTE
Is not on medication.  Watch sweets and starch in diet and push daily walking activity  Lab Results   Component Value Date    HGBA1C 7.1 (A) 04/25/2024

## 2024-04-25 NOTE — PROGRESS NOTES
Name: Felix Ochoa      : 1940      MRN: [id number]  Encounter Provider: Milad Payne MD  Encounter Date: 2024   Encounter department: [facility] PRIMARY [facility]    Assessment & Plan     1. Essential hypertension  Assessment & Plan:  Overall stable, continue current regimen      2. Type 2 diabetes mellitus with stage 3a chronic kidney disease, without long-term current use of insulin (HCC)  Assessment & Plan:  Is not on medication.  Watch sweets and starch in diet and push daily walking activity  Lab Results   Component Value Date    HGBA1C 7.1 (A) 2024       Orders:  -     POCT hemoglobin A1c    3. Acquired hypothyroidism  Comments:  Overall stable continue current regimen  Assessment & Plan:  Should be on the 50 mcg dose and I resent this prescription    Orders:  -     levothyroxine 50 mcg tablet; Take 1 tablet (50 mcg total) by mouth daily    4. Coronary artery disease involving coronary bypass graft of native heart without angina pectoris  Assessment & Plan:  Appears stable, continue current regimen and follow-up.  Push activity as tolerated             Subjective        Patient has history of hypertension, hypothyroidism, coronary artery disease, diabetes mellitus adult and degenerative arthritis.  Continues to have his multiple joint pain particularly back and right knee but is very physically active at work.  Had been confused about the dosage of his levothyroxine and has not been taking it for about 3 weeks      Review of Systems   Constitutional:  Negative for activity change, appetite change, chills, fatigue, fever and unexpected weight change.   HENT:  Negative for congestion, rhinorrhea and trouble swallowing.    Eyes:  Negative for visual disturbance.   Respiratory:  Negative for apnea, cough, chest tightness and shortness of breath.    Cardiovascular:  Negative for chest pain, palpitations and leg swelling.   Gastrointestinal:  Negative for abdominal  "distention, abdominal pain, constipation and diarrhea.   Endocrine: Negative for polyuria.   Genitourinary:  Positive for difficulty urinating (Stream is slower).   Musculoskeletal:  Positive for arthralgias and back pain. Negative for myalgias.   Skin:  Negative for rash.   Allergic/Immunologic: Negative for environmental allergies.   Neurological:  Negative for dizziness, weakness, light-headedness, numbness and headaches.   Hematological:  Negative for adenopathy.   Psychiatric/Behavioral:  Negative for agitation.        Current Outpatient Medications on File Prior to Visit   Medication Sig    amLODIPine (NORVASC) 5 mg tablet Take 1 tablet (5 mg total) by mouth daily    aspirin (ECOTRIN LOW STRENGTH) 81 mg EC tablet Take 324 mg by mouth daily     clopidogrel (PLAVIX) 75 mg tablet Take 75 mg by mouth daily    ketoconazole (NIZORAL) 2 % cream Apply topically daily    meloxicam (Mobic) 15 mg tablet Take 1 tablet (15 mg total) by mouth daily    Multiple Vitamin (MULTIVITAMIN) capsule Take 1 capsule by mouth daily    Omega-3 Fatty Acids (FISH OIL) 1200 MG CAPS Take 4 each by mouth daily     silver sulfadiazine (SILVADENE,SSD) 1 % cream Apply topically daily    simvastatin (ZOCOR) 40 mg tablet Take 1 tablet (40 mg total) by mouth daily at bedtime    [DISCONTINUED] levothyroxine 50 mcg tablet Take 0.5 tablets (25 mcg total) by mouth daily    neomycin-polymyxin-hydrocortisone (CORTISPORIN) 0.35%-10,000 units/mL-1% otic suspension Administer 4 drops into ears 3 (three) times a day for 7 days       Objective     Blood Pressure 122/82 (BP Location: Left arm, Patient Position: Sitting, Cuff Size: Standard)   Pulse (Abnormal) 42   Height 5' 2\" (1.575 m)   Weight 76.1 kg (167 lb 12.8 oz)   Oxygen Saturation 93%   Body Mass Index 30.69 kg/m²     Physical Exam  Vitals and nursing note reviewed.   Constitutional:       Appearance: Normal appearance. He is well-developed.   HENT:      Head: Normocephalic.      Nose: Nose " normal.   Eyes:      Conjunctiva/sclera: Conjunctivae normal.   Neck:      Thyroid: No thyromegaly.      Vascular: No carotid bruit.   Cardiovascular:      Rate and Rhythm: Normal rate and regular rhythm.      Heart sounds: Murmur (Soft systolic murmur at left sternal border.  Heart rate is 66) heard.   Pulmonary:      Effort: Pulmonary effort is normal.      Breath sounds: Normal breath sounds.   Abdominal:      General: Abdomen is flat. There is no distension.      Palpations: Abdomen is soft. There is no mass.      Tenderness: There is no abdominal tenderness.   Musculoskeletal:         General: Normal range of motion.      Cervical back: Normal range of motion and neck supple. No tenderness.      Right lower leg: No edema.      Left lower leg: No edema.   Lymphadenopathy:      Cervical: No cervical adenopathy.   Skin:     General: Skin is warm and dry.      Findings: No rash.   Neurological:      Mental Status: He is alert and oriented to person, place, and time.      Motor: No weakness.      Coordination: Coordination normal.      Gait: Gait normal.      Deep Tendon Reflexes: Reflexes normal.   Psychiatric:         Mood and Affect: Mood normal.       Milad Payne MD

## 2024-08-22 ENCOUNTER — RA CDI HCC (OUTPATIENT)
Dept: OTHER | Facility: HOSPITAL | Age: 84
End: 2024-08-22

## 2024-08-29 ENCOUNTER — OFFICE VISIT (OUTPATIENT)
Dept: FAMILY MEDICINE CLINIC | Facility: CLINIC | Age: 84
End: 2024-08-29
Payer: MEDICARE

## 2024-08-29 VITALS
DIASTOLIC BLOOD PRESSURE: 80 MMHG | SYSTOLIC BLOOD PRESSURE: 118 MMHG | BODY MASS INDEX: 30.62 KG/M2 | WEIGHT: 166.4 LBS | HEIGHT: 62 IN

## 2024-08-29 DIAGNOSIS — E11.22 TYPE 2 DIABETES MELLITUS WITH STAGE 3A CHRONIC KIDNEY DISEASE, WITHOUT LONG-TERM CURRENT USE OF INSULIN (HCC): Primary | ICD-10-CM

## 2024-08-29 DIAGNOSIS — N18.31 TYPE 2 DIABETES MELLITUS WITH STAGE 3A CHRONIC KIDNEY DISEASE, WITHOUT LONG-TERM CURRENT USE OF INSULIN (HCC): Primary | ICD-10-CM

## 2024-08-29 DIAGNOSIS — Z00.00 MEDICARE ANNUAL WELLNESS VISIT, SUBSEQUENT: ICD-10-CM

## 2024-08-29 PROCEDURE — G0444 DEPRESSION SCREEN ANNUAL: HCPCS | Performed by: FAMILY MEDICINE

## 2024-08-29 PROCEDURE — 99214 OFFICE O/P EST MOD 30 MIN: CPT | Performed by: FAMILY MEDICINE

## 2024-08-29 PROCEDURE — G0439 PPPS, SUBSEQ VISIT: HCPCS | Performed by: FAMILY MEDICINE

## 2024-08-29 NOTE — PATIENT INSTRUCTIONS
Medicare Preventive Visit Patient Instructions  Thank you for completing your Welcome to Medicare Visit or Medicare Annual Wellness Visit today. Your next wellness visit will be due in one year (8/30/2025).  The screening/preventive services that you may require over the next 5-10 years are detailed below. Some tests may not apply to you based off risk factors and/or age. Screening tests ordered at today's visit but not completed yet may show as past due. Also, please note that scanned in results may not display below.  Preventive Screenings:  Service Recommendations Previous Testing/Comments   Colorectal Cancer Screening  Colonoscopy    Fecal Occult Blood Test (FOBT)/Fecal Immunochemical Test (FIT)  Fecal DNA/Cologuard Test  Flexible Sigmoidoscopy Age: 45-75 years old   Colonoscopy: every 10 years (May be performed more frequently if at higher risk)  OR  FOBT/FIT: every 1 year  OR  Cologuard: every 3 years  OR  Sigmoidoscopy: every 5 years  Screening may be recommended earlier than age 45 if at higher risk for colorectal cancer. Also, an individualized decision between you and your healthcare provider will decide whether screening between the ages of 76-85 would be appropriate. Colonoscopy: Not on file  FOBT/FIT: Not on file  Cologuard: Not on file  Sigmoidoscopy: Not on file          Prostate Cancer Screening Individualized decision between patient and health care provider in men between ages of 55-69   Medicare will cover every 12 months beginning on the day after your 50th birthday PSA: No results in last 5 years     Screening Not Indicated     Hepatitis C Screening Once for adults born between 1945 and 1965  More frequently in patients at high risk for Hepatitis C Hep C Antibody: Not on file        Diabetes Screening 1-2 times per year if you're at risk for diabetes or have pre-diabetes Fasting glucose: 125 mg/dL (7/9/2021)  A1C: 7.1 (4/25/2024)  Screening Not Indicated  History Diabetes   Cholesterol Screening  Once every 5 years if you don't have a lipid disorder. May order more often based on risk factors. Lipid panel: Not on file  Screening Not Indicated  History Lipid Disorder      Other Preventive Screenings Covered by Medicare:  Abdominal Aortic Aneurysm (AAA) Screening: covered once if your at risk. You're considered to be at risk if you have a family history of AAA or a male between the age of 65-75 who smoking at least 100 cigarettes in your lifetime.  Lung Cancer Screening: covers low dose CT scan once per year if you meet all of the following conditions: (1) Age 55-77; (2) No signs or symptoms of lung cancer; (3) Current smoker or have quit smoking within the last 15 years; (4) You have a tobacco smoking history of at least 20 pack years (packs per day x number of years you smoked); (5) You get a written order from a healthcare provider.  Glaucoma Screening: covered annually if you're considered high risk: (1) You have diabetes OR (2) Family history of glaucoma OR (3)  aged 50 and older OR (4)  American aged 65 and older  Osteoporosis Screening: covered every 2 years if you meet one of the following conditions: (1) Have a vertebral abnormality; (2) On glucocorticoid therapy for more than 3 months; (3) Have primary hyperparathyroidism; (4) On osteoporosis medications and need to assess response to drug therapy.  HIV Screening: covered annually if you're between the age of 15-65. Also covered annually if you are younger than 15 and older than 65 with risk factors for HIV infection. For pregnant patients, it is covered up to 3 times per pregnancy.    Immunizations:  Immunization Recommendations   Influenza Vaccine Annual influenza vaccination during flu season is recommended for all persons aged >= 6 months who do not have contraindications   Pneumococcal Vaccine   * Pneumococcal conjugate vaccine = PCV13 (Prevnar 13), PCV15 (Vaxneuvance), PCV20 (Prevnar 20)  * Pneumococcal polysaccharide  vaccine = PPSV23 (Pneumovax) Adults 19-65 yo with certain risk factors or if 65+ yo  If never received any pneumonia vaccine: recommend Prevnar 20 (PCV20)  Give PCV20 if previously received 1 dose of PCV13 or PPSV23   Hepatitis B Vaccine 3 dose series if at intermediate or high risk (ex: diabetes, end stage renal disease, liver disease)   Respiratory syncytial virus (RSV) Vaccine - COVERED BY MEDICARE PART D  * RSVPreF3 (Arexvy) CDC recommends that adults 60 years of age and older may receive a single dose of RSV vaccine using shared clinical decision-making (SCDM)   Tetanus (Td) Vaccine - COST NOT COVERED BY MEDICARE PART B Following completion of primary series, a booster dose should be given every 10 years to maintain immunity against tetanus. Td may also be given as tetanus wound prophylaxis.   Tdap Vaccine - COST NOT COVERED BY MEDICARE PART B Recommended at least once for all adults. For pregnant patients, recommended with each pregnancy.   Shingles Vaccine (Shingrix) - COST NOT COVERED BY MEDICARE PART B  2 shot series recommended in those 19 years and older who have or will have weakened immune systems or those 50 years and older     Health Maintenance Due:  There are no preventive care reminders to display for this patient.  Immunizations Due:      Topic Date Due    COVID-19 Vaccine (4 - 2023-24 season) 09/01/2023    Influenza Vaccine (1) 09/01/2024     Advance Directives   What are advance directives?  Advance directives are legal documents that state your wishes and plans for medical care. These plans are made ahead of time in case you lose your ability to make decisions for yourself. Advance directives can apply to any medical decision, such as the treatments you want, and if you want to donate organs.   What are the types of advance directives?  There are many types of advance directives, and each state has rules about how to use them. You may choose a combination of any of the following:  Living will:   This is a written record of the treatment you want. You can also choose which treatments you do not want, which to limit, and which to stop at a certain time. This includes surgery, medicine, IV fluid, and tube feedings.   Durable power of  for healthcare (DPAHC):  This is a written record that states who you want to make healthcare choices for you when you are unable to make them for yourself. This person, called a proxy, is usually a family member or a friend. You may choose more than 1 proxy.  Do not resuscitate (DNR) order:  A DNR order is used in case your heart stops beating or you stop breathing. It is a request not to have certain forms of treatment, such as CPR. A DNR order may be included in other types of advance directives.  Medical directive:  This covers the care that you want if you are in a coma, near death, or unable to make decisions for yourself. You can list the treatments you want for each condition. Treatment may include pain medicine, surgery, blood transfusions, dialysis, IV or tube feedings, and a ventilator (breathing machine).  Values history:  This document has questions about your views, beliefs, and how you feel and think about life. This information can help others choose the care that you would choose.  Why are advance directives important?  An advance directive helps you control your care. Although spoken wishes may be used, it is better to have your wishes written down. Spoken wishes can be misunderstood, or not followed. Treatments may be given even if you do not want them. An advance directive may make it easier for your family to make difficult choices about your care.   Fall Prevention    Fall prevention  includes ways to make your home and other areas safer. It also includes ways you can move more carefully to prevent a fall. Health conditions that cause changes in your blood pressure, vision, or muscle strength and coordination may increase your risk for falls.  Medicines may also increase your risk for falls if they make you dizzy, weak, or sleepy.   Fall prevention tips:   Stand or sit up slowly.    Use assistive devices as directed.    Wear shoes that fit well and have soles that .    Wear a personal alarm.    Stay active.    Manage your medical conditions.    Home Safety Tips:  Add items to prevent falls in the bathroom.    Keep paths clear.    Install bright lights in your home.    Keep items you use often on shelves within reach.    Paint or place reflective tape on the edges of your stairs.    Weight Management   Why it is important to manage your weight:  Being overweight increases your risk of health conditions such as heart disease, high blood pressure, type 2 diabetes, and certain types of cancer. It can also increase your risk for osteoarthritis, sleep apnea, and other respiratory problems. Aim for a slow, steady weight loss. Even a small amount of weight loss can lower your risk of health problems.  How to lose weight safely:  A safe and healthy way to lose weight is to eat fewer calories and get regular exercise. You can lose up about 1 pound a week by decreasing the number of calories you eat by 500 calories each day.   Healthy meal plan for weight management:  A healthy meal plan includes a variety of foods, contains fewer calories, and helps you stay healthy. A healthy meal plan includes the following:  Eat whole-grain foods more often.  A healthy meal plan should contain fiber. Fiber is the part of grains, fruits, and vegetables that is not broken down by your body. Whole-grain foods are healthy and provide extra fiber in your diet. Some examples of whole-grain foods are whole-wheat breads and pastas, oatmeal, brown rice, and bulgur.  Eat a variety of vegetables every day.  Include dark, leafy greens such as spinach, kale, kyler greens, and mustard greens. Eat yellow and orange vegetables such as carrots, sweet potatoes, and winter squash.   Eat a  variety of fruits every day.  Choose fresh or canned fruit (canned in its own juice or light syrup) instead of juice. Fruit juice has very little or no fiber.  Eat low-fat dairy foods.  Drink fat-free (skim) milk or 1% milk. Eat fat-free yogurt and low-fat cottage cheese. Try low-fat cheeses such as mozzarella and other reduced-fat cheeses.  Choose meat and other protein foods that are low in fat.  Choose beans or other legumes such as split peas or lentils. Choose fish, skinless poultry (chicken or turkey), or lean cuts of red meat (beef or pork). Before you cook meat or poultry, cut off any visible fat.   Use less fat and oil.  Try baking foods instead of frying them. Add less fat, such as margarine, sour cream, regular salad dressing and mayonnaise to foods. Eat fewer high-fat foods. Some examples of high-fat foods include french fries, doughnuts, ice cream, and cakes.  Eat fewer sweets.  Limit foods and drinks that are high in sugar. This includes candy, cookies, regular soda, and sweetened drinks.  Exercise:  Exercise at least 30 minutes per day on most days of the week. Some examples of exercise include walking, biking, dancing, and swimming. You can also fit in more physical activity by taking the stairs instead of the elevator or parking farther away from stores. Ask your healthcare provider about the best exercise plan for you.      © Copyright [employer] 2018 Information is for End User's use only and may not be sold, redistributed or otherwise used for commercial purposes. All illustrations and images included in CareNotes® are the copyrighted property of A.D.A.M., Inc. or [employer].  Overall patient is very functional but has an awful lot of problems with muscle and joint pain because of certain types of activity that might be better avoided.  Should get flu shot in the fall.  Recent labs were okay and cholesterol level was low.  May consider taking the simvastatin 1/2 tablet a day.   Continue overall follow-up

## 2024-08-29 NOTE — PROGRESS NOTES
Diabetic Foot Exam    Patient's shoes and socks removed.    Right Foot/Ankle   Right Foot Inspection  Skin Exam: skin normal and skin intact. No dry skin, no warmth, no callus, no erythema, no maceration, no abnormal color, no pre-ulcer, no ulcer and no callus.     Toe Exam: ROM and strength within normal limits and right toe deformity (Hammertoes and bunion at the base of the great toe).     Sensory   Monofilament testing: intact    Vascular  Capillary refills: < 3 seconds  The right DP pulse is 2+. The right PT pulse is 1+.     Left Foot/Ankle  Left Foot Inspection  Skin Exam: skin normal and skin intact. No dry skin, no warmth, no erythema, no maceration, normal color, no pre-ulcer, no ulcer and no callus.     Toe Exam: ROM and strength within normal limits and left toe deformity (Hammertoes).     Sensory   Monofilament testing: intact    Vascular  Capillary refills: < 3 seconds  The left DP pulse is 1+. The left PT pulse is 1+.     Assign Risk Category  Deformity present  No loss of protective sensation  No weak pulses  Risk: 1      Ambulatory Visit  Name: Felix Ochoa      : 1940      MRN: [id number]  Encounter Provider: Milad Payne MD  Encounter Date: 2024   Encounter department: [facility] PRIMARY [facility]    Assessment & Plan   1. Type 2 diabetes mellitus with stage 3a chronic kidney disease, without long-term current use of insulin (HCC)  2. Medicare annual wellness visit, subsequent      Depression Screening and Follow-up Plan: Patient was screened for depression during today's encounter. They screened negative with a PHQ-2 score of 2.    Falls Plan of Care: balance, strength, and gait training instructions were provided. Should try carrying cane or walking stick      Preventive health issues were discussed with patient, and age appropriate screening tests were ordered as noted in patient's After Visit Summary. Personalized health advice and appropriate  referrals for health education or preventive services given if needed, as noted in patient's After Visit Summary.    History of Present Illness       Patient has history of hypertension, hypothyroidism, coronary artery disease, diabetes mellitus adult and degenerative arthritis.  Continues to have his multiple joint pain particularly back and right knee but is very physically active at work       Patient Care Team:  Milad Payne MD as PCP - General (Family Medicine)    Review of Systems   Constitutional:  Negative for activity change, appetite change, chills, fatigue, fever and unexpected weight change.   HENT:  Positive for hearing loss (But does not have a problem hearing). Negative for congestion, dental problem, rhinorrhea and trouble swallowing.    Eyes: Negative.  Negative for visual disturbance.   Respiratory:  Negative for apnea, cough, chest tightness and shortness of breath.    Cardiovascular:  Negative for chest pain, palpitations and leg swelling.   Gastrointestinal:  Negative for abdominal distention, abdominal pain, constipation and diarrhea.   Endocrine: Negative for polyuria (Nocturia x 1).   Genitourinary:  Negative for difficulty urinating.   Musculoskeletal:  Positive for arthralgias, back pain and gait problem. Negative for myalgias.   Skin:  Negative for color change and rash.   Allergic/Immunologic: Negative for environmental allergies.   Neurological:  Positive for dizziness and light-headedness. Negative for weakness, numbness and headaches.   Hematological:  Negative for adenopathy.   Psychiatric/Behavioral:  Negative for agitation and sleep disturbance.    All other systems reviewed and are negative.    Medical History Reviewed by provider this encounter:  Tobacco  Allergies  Meds  Problems  Med Hx  Surg Hx  Fam Hx       Annual Wellness Visit Questionnaire   Felix is here for his Subsequent Wellness visit. Last Medicare Wellness visit information reviewed, patient  interviewed and updates made to the record today.      Health Risk Assessment:   Patient rates overall health as fair. Patient feels that their physical health rating is slightly worse. Patient is dissatisfied with their life. Eyesight was rated as same. Hearing was rated as same. Patient feels that their emotional and mental health rating is slightly worse. Patients states they are sometimes angry. Patient states they are sometimes unusually tired/fatigued. Pain experienced in the last 7 days has been a lot. Patient's pain rating has been 6/10. Patient states that he has experienced no weight loss or gain in last 6 months. Has his multiple joint pain from excessive working activity    Depression Screening:   PHQ-2 Score: 2      Fall Risk Screening:   In the past year, patient has experienced: history of falling in past year    Number of falls: 2 or more  Injured during fall?: No    Feels unsteady when standing or walking?: Yes    Worried about falling?: Yes      Home Safety:  Patient has trouble with stairs inside or outside of their home. Patient has working smoke alarms and has working carbon monoxide detector. Home safety hazards include: none. I did advise to consider using cane or walking stick    Nutrition:   Current diet is Regular. Watch starch in diet    Medications:   Patient is not currently taking any over-the-counter supplements. Patient is not able to manage medications. No issues    Activities of Daily Living (ADLs)/Instrumental Activities of Daily Living (IADLs):   Walk and transfer into and out of bed and chair?: Yes  Dress and groom yourself?: Yes    Bathe or shower yourself?: Yes    Feed yourself? Yes  Do your laundry/housekeeping?: Yes  Manage your money, pay your bills and track your expenses?: Yes  Make your own meals?: Yes    Do your own shopping?: Yes    ADL comments: No issues    Previous Hospitalizations:   Any hospitalizations or ED visits within the last 12 months?: No       Hospitalization Comments: Follows up with [facility] cardiology    Advance Care Planning:   Living will: Yes    Durable POA for healthcare: Yes    Advanced directive: Yes    Advanced directive counseling given: Yes    Five wishes given: No    End of Life Decisions reviewed with patient: No      Comments: Should bring this into office to have scanned into the chart    Cognitive Screening:   Provider or family/friend/caregiver concerned regarding cognition?: No    PREVENTIVE SCREENINGS      Cardiovascular Screening:    General: Screening Not Indicated and History Lipid Disorder      Diabetes Screening:     General: Screening Not Indicated and History Diabetes      Colorectal Cancer Screening:     General: Screening Not Indicated      Prostate Cancer Screening:    General: Screening Not Indicated      Osteoporosis Screening:    General: Screening Not Indicated      Abdominal Aortic Aneurysm (AAA) Screening:        General: Screening Not Indicated      Lung Cancer Screening:     General: Screening Not Indicated      Hepatitis C Screening:    General: Screening Not Indicated      Preventive Screening Comments: Follow-up with routine eye care and have reports forwarded to office    Screening, Brief Intervention, and Referral to Treatment (SBIRT)    Screening  Typical number of drinks in a day: 0  Typical number of drinks in a week: 0  Interpretation: Low risk drinking behavior.    Single Item Drug Screening:  How often have you used an illegal drug (including marijuana) or a prescription medication for non-medical reasons in the past year? never    Single Item Drug Screen Score: 0  Interpretation: Negative screen for possible drug use disorder    Brief Intervention  Alcohol & drug use screenings were reviewed. No concerns regarding substance use disorder identified.     Annual Depression Screening  Time spent screening and evaluating the patient for depression during today's encounter was 5  "minutes.    Social Determinants of Health     Financial Resource Strain: Low Risk  (8/2/2023)    Overall Financial Resource Strain (CARDIA)     Difficulty of Paying Living Expenses: Not hard at all   Food Insecurity: No Food Insecurity (8/29/2024)    Hunger Vital Sign     Worried About Running Out of Food in the Last Year: Never true     Ran Out of Food in the Last Year: Never true   Transportation Needs: No Transportation Needs (8/29/2024)    PRAPARE - Transportation     Lack of Transportation (Medical): No     Lack of Transportation (Non-Medical): No   Housing Stability: Unknown (8/29/2024)    Housing Stability Vital Sign     Unable to Pay for Housing in the Last Year: No     Homeless in the Last Year: No   Utilities: Not At Risk (8/29/2024)    [facility] Utilities     Threatened with loss of utilities: No     No results found.    Objective     Blood Pressure 118/80 (BP Location: Left arm, Patient Position: Sitting, Cuff Size: Large)   Height 5' 2\" (1.575 m)   Weight 75.5 kg (166 lb 6.4 oz)   Body Mass Index 30.43 kg/m²     Physical Exam  Vitals and nursing note reviewed.   Constitutional:       Appearance: Normal appearance.   HENT:      Head: Normocephalic.      Right Ear: Tympanic membrane, ear canal and external ear normal. Decreased hearing (25 dB hearing screen off except at 500 Hz.  No difficulty with conversation) noted.      Left Ear: Tympanic membrane, ear canal and external ear normal. Decreased hearing (25 dB hearing screen off except at 500 Hz) noted.      Nose: Nose normal.      Mouth/Throat:      Mouth: Mucous membranes are moist.      Dentition: Abnormal dentition (Multiple missing lower teeth). Has dentures (Upper dentures).   Eyes:      Extraocular Movements: Extraocular movements intact.      Conjunctiva/sclera: Conjunctivae normal.      Pupils: Pupils are equal, round, and reactive to light.   Neck:      Vascular: No carotid bruit.   Cardiovascular:      Rate and Rhythm: Normal rate and regular " rhythm.      Pulses: no weak pulses.           Dorsalis pedis pulses are 2+ on the right side and 1+ on the left side.        Posterior tibial pulses are 1+ on the right side and 1+ on the left side.      Heart sounds: Normal heart sounds. No murmur (Rate is 66) heard.  Pulmonary:      Effort: Pulmonary effort is normal.      Breath sounds: Normal breath sounds.   Abdominal:      General: Abdomen is flat. There is no distension.      Palpations: Abdomen is soft. There is no mass.      Tenderness: There is no abdominal tenderness.   Musculoskeletal:         General: No swelling.      Cervical back: Normal range of motion and neck supple. No tenderness. No muscular tenderness.      Right lower leg: No edema.      Left lower leg: No edema.      Right foot: Deformity (Hammertoes and bunion at the great toe) present.      Left foot: Deformity (Hammertoes) present.        Feet:    Feet:      Right foot:      Protective Sensation: 8 sites tested.  8 sites sensed.      Skin integrity: Skin integrity normal. No ulcer, skin breakdown, erythema, warmth, callus or dry skin.      Left foot:      Protective Sensation: 8 sites tested.  8 sites sensed.      Skin integrity: Skin integrity normal. No ulcer, skin breakdown, erythema, warmth, callus or dry skin.   Lymphadenopathy:      Cervical: No cervical adenopathy.   Skin:     General: Skin is warm and dry.      Capillary Refill: Capillary refill takes 2 to 3 seconds.      Findings: No rash.   Neurological:      General: No focal deficit present.      Mental Status: He is alert and oriented to person, place, and time.      Cranial Nerves: No cranial nerve deficit.      Sensory: No sensory deficit.      Motor: No weakness.      Coordination: Coordination normal.      Gait: Gait abnormal.      Deep Tendon Reflexes: Reflexes abnormal (Reflexes diminished).   Psychiatric:         Mood and Affect: Mood normal.         Behavior: Behavior normal.         Thought Content: Thought content  normal.         Judgment: Judgment normal.

## 2024-09-17 ENCOUNTER — APPOINTMENT (OUTPATIENT)
Dept: LAB | Facility: CLINIC | Age: 84
End: 2024-09-17
Payer: MEDICARE

## 2024-09-17 DIAGNOSIS — E78.5 HYPERLIPIDEMIA, UNSPECIFIED HYPERLIPIDEMIA TYPE: ICD-10-CM

## 2024-09-17 LAB
ALT SERPL W P-5'-P-CCNC: 34 U/L (ref 7–52)
AST SERPL W P-5'-P-CCNC: 36 U/L (ref 13–39)
CHOLEST SERPL-MCNC: 166 MG/DL
HDLC SERPL-MCNC: 41 MG/DL
LDLC SERPL CALC-MCNC: 71 MG/DL (ref 0–100)
NONHDLC SERPL-MCNC: 125 MG/DL
TRIGL SERPL-MCNC: 268 MG/DL

## 2024-09-17 PROCEDURE — 84460 ALANINE AMINO (ALT) (SGPT): CPT

## 2024-09-17 PROCEDURE — 36415 COLL VENOUS BLD VENIPUNCTURE: CPT

## 2024-09-17 PROCEDURE — 80061 LIPID PANEL: CPT

## 2024-09-17 PROCEDURE — 84450 TRANSFERASE (AST) (SGOT): CPT

## 2024-10-16 ENCOUNTER — IMMUNIZATIONS (OUTPATIENT)
Dept: FAMILY MEDICINE CLINIC | Facility: CLINIC | Age: 84
End: 2024-10-16
Payer: MEDICARE

## 2024-10-16 DIAGNOSIS — Z23 ENCOUNTER FOR IMMUNIZATION: Primary | ICD-10-CM

## 2024-10-16 PROCEDURE — 90662 IIV NO PRSV INCREASED AG IM: CPT

## 2024-10-16 PROCEDURE — G0008 ADMIN INFLUENZA VIRUS VAC: HCPCS

## 2024-12-18 ENCOUNTER — RA CDI HCC (OUTPATIENT)
Dept: OTHER | Facility: HOSPITAL | Age: 84
End: 2024-12-18

## 2024-12-27 ENCOUNTER — OFFICE VISIT (OUTPATIENT)
Dept: FAMILY MEDICINE CLINIC | Facility: CLINIC | Age: 84
End: 2024-12-27
Payer: MEDICARE

## 2024-12-27 VITALS
HEART RATE: 50 BPM | DIASTOLIC BLOOD PRESSURE: 80 MMHG | SYSTOLIC BLOOD PRESSURE: 128 MMHG | OXYGEN SATURATION: 95 % | WEIGHT: 166 LBS | HEIGHT: 62 IN | BODY MASS INDEX: 30.55 KG/M2

## 2024-12-27 DIAGNOSIS — M15.0 PRIMARY OSTEOARTHRITIS INVOLVING MULTIPLE JOINTS: Chronic | ICD-10-CM

## 2024-12-27 DIAGNOSIS — N18.31 TYPE 2 DIABETES MELLITUS WITH STAGE 3A CHRONIC KIDNEY DISEASE, WITHOUT LONG-TERM CURRENT USE OF INSULIN (HCC): Primary | ICD-10-CM

## 2024-12-27 DIAGNOSIS — I10 ESSENTIAL HYPERTENSION: Chronic | ICD-10-CM

## 2024-12-27 DIAGNOSIS — E03.9 ACQUIRED HYPOTHYROIDISM: Chronic | ICD-10-CM

## 2024-12-27 DIAGNOSIS — I25.810 CORONARY ARTERY DISEASE INVOLVING CORONARY BYPASS GRAFT OF NATIVE HEART WITHOUT ANGINA PECTORIS: Chronic | ICD-10-CM

## 2024-12-27 DIAGNOSIS — E11.22 TYPE 2 DIABETES MELLITUS WITH STAGE 3A CHRONIC KIDNEY DISEASE, WITHOUT LONG-TERM CURRENT USE OF INSULIN (HCC): Primary | ICD-10-CM

## 2024-12-27 LAB — SL AMB POCT HEMOGLOBIN AIC: 7.6 (ref ?–6.5)

## 2024-12-27 PROCEDURE — 99214 OFFICE O/P EST MOD 30 MIN: CPT | Performed by: FAMILY MEDICINE

## 2024-12-27 PROCEDURE — 83036 HEMOGLOBIN GLYCOSYLATED A1C: CPT | Performed by: FAMILY MEDICINE

## 2024-12-27 NOTE — ASSESSMENT & PLAN NOTE
A1c is higher.  Needs to be more careful with the starch in the diet.   Lab Results   Component Value Date    HGBA1C 7.6 (A) 12/27/2024       Orders:    POCT hemoglobin A1c

## 2024-12-27 NOTE — ASSESSMENT & PLAN NOTE
Continues to have problems but is very active and again advised to be practical with his activity

## 2024-12-27 NOTE — PROGRESS NOTES
Name: Felix Ochoa      : 1940      MRN: [id number]  Encounter Provider: Milad Payne MD  Encounter Date: 2024   Encounter department: [facility] PRIMARY CARE    Assessment & Plan  Type 2 diabetes mellitus with stage 3a chronic kidney disease, without long-term current use of insulin (HCC)  A1c is higher.  Needs to be more careful with the starch in the diet.   Lab Results   Component Value Date    HGBA1C 7.6 (A) 2024       Orders:    POCT hemoglobin A1c    Essential hypertension  Overall stable, continue current regimen       Acquired hypothyroidism  Stable, continue baseline meds       Coronary artery disease involving coronary bypass graft of native heart without angina pectoris  Seems to be doing well, continue current meds and follow-up.  Push activity as tolerated       Primary osteoarthritis involving multiple joints  Continues to have problems but is very active and again advised to be practical with his activity            History of Present Illness         Patient has history of hypertension, hypothyroidism, coronary artery disease, diabetes mellitus adult and degenerative arthritis.  Continues to have his multiple joint pain particularly back and right knee but is very physically active at work      Review of Systems   Constitutional:  Negative for activity change, appetite change, chills, fatigue, fever and unexpected weight change.   HENT:  Negative for congestion and trouble swallowing.    Eyes: Negative.  Negative for visual disturbance.   Respiratory:  Positive for shortness of breath. Negative for apnea, cough and chest tightness.    Cardiovascular:  Negative for chest pain, palpitations and leg swelling.   Gastrointestinal:  Negative for abdominal distention, abdominal pain, constipation and diarrhea.   Endocrine: Negative for polyuria.   Genitourinary:  Positive for difficulty urinating.   Musculoskeletal:  Positive for arthralgias. Negative  for myalgias.   Skin:  Negative for color change and rash.   Allergic/Immunologic: Negative for environmental allergies.   Neurological:  Positive for dizziness and light-headedness. Negative for weakness and headaches.   Hematological:  Negative for adenopathy.   Psychiatric/Behavioral:  Negative for agitation and sleep disturbance.    All other systems reviewed and are negative.    Past Medical History:   Diagnosis Date    Chronic ischemic heart disease     Coronary artery disease     hx cabg x4 2007    Hearing loss     Hyperlipidemia     Hypertension     Hypokalemia     Hypothyroid     OA (osteoarthritis)     left knee    Prediabetes     S/P AVR     2007    Type 2 diabetes mellitus (HCC)     Wears dentures     full upper    Wears glasses      Past Surgical History:   Procedure Laterality Date    AORTIC VALVE REPLACEMENT      BACK SURGERY      lumbar     CATARACT EXTRACTION Bilateral     CORONARY ARTERY BYPASS GRAFT      x4    2007    KNEE ARTHROSCOPY Bilateral     [place] ARTHRP KNE CONDYLE&PLATU MEDIAL&LAT COMPARTMENTS Left 1/17/2018    Procedure: ARTHROPLASTY KNEE TOTAL;  Surgeon: Ajit Smith MD;  Location: [facility] OR;  Service: Orthopedics    SHOULDER ARTHROSCOPY Right      Family History   Problem Relation Age of Onset    Cancer Mother      Social History     Tobacco Use    Smoking status: Never    Smokeless tobacco: Never   Vaping Use    Vaping status: Never Used   Substance and Sexual Activity    Alcohol use: No    Drug use: No    Sexual activity: Not Currently     Current Outpatient Medications on File Prior to Visit   Medication Sig    amLODIPine (NORVASC) 5 mg tablet Take 1 tablet (5 mg total) by mouth daily    aspirin (ECOTRIN LOW STRENGTH) 81 mg EC tablet Take 324 mg by mouth daily     clopidogrel (PLAVIX) 75 mg tablet Take 75 mg by mouth daily    ketoconazole (NIZORAL) 2 % cream Apply topically daily    levothyroxine 50 mcg tablet Take 1 tablet (50 mcg total) by mouth daily    meloxicam (Mobic) 15 mg  "tablet Take 1 tablet (15 mg total) by mouth daily    Multiple Vitamin (MULTIVITAMIN) capsule Take 1 capsule by mouth daily    Omega-3 Fatty Acids (FISH OIL) 1200 MG CAPS Take 4 each by mouth daily     silver sulfadiazine (SILVADENE,SSD) 1 % cream Apply topically daily    simvastatin (ZOCOR) 40 mg tablet Take 1 tablet (40 mg total) by mouth daily at bedtime    neomycin-polymyxin-hydrocortisone (CORTISPORIN) 0.35%-10,000 units/mL-1% otic suspension Administer 4 drops into ears 3 (three) times a day for 7 days     No Known Allergies  Immunization History   Administered Date(s) Administered    COVID-19 MODERNA VACC 0.5 ML IM 01/28/2021, 02/25/2021, 11/26/2021    INFLUENZA 12/18/2007, 09/11/2008, 09/08/2009, 09/08/2010, 09/26/2011, 10/10/2012, 11/12/2013, 12/10/2014, 09/22/2015, 10/04/2016, 11/01/2017, 11/06/2018    Influenza Split High Dose Preservative Free IM 10/16/2024    Influenza, high dose seasonal 0.7 mL 10/28/2019, 10/27/2020, 11/23/2021, 10/10/2022, 12/04/2023    Pneumococcal Conjugate 13-Valent 05/02/2018    Pneumococcal Polysaccharide PPV23 12/18/2007, 05/02/2018    Tdap 07/08/2021     Objective   Blood Pressure 128/80 (BP Location: Left arm, Patient Position: Sitting, Cuff Size: Standard)   Pulse (Abnormal) 50   Height 5' 2\" (1.575 m)   Weight 75.3 kg (166 lb)   Oxygen Saturation 95%   Body Mass Index 30.36 kg/m²     Physical Exam  Vitals and nursing note reviewed.   Constitutional:       General: He is not in acute distress.     Appearance: He is well-developed.   HENT:      Head: Normocephalic and atraumatic.   Eyes:      Conjunctiva/sclera: Conjunctivae normal.   Cardiovascular:      Rate and Rhythm: Normal rate and regular rhythm.      Heart sounds: Murmur heard.   Pulmonary:      Effort: Pulmonary effort is normal. No respiratory distress.      Breath sounds: Normal breath sounds.   Abdominal:      Palpations: Abdomen is soft.      Tenderness: There is no abdominal tenderness.   Musculoskeletal:    "      General: No swelling.      Cervical back: Neck supple.   Skin:     General: Skin is warm and dry.      Capillary Refill: Capillary refill takes less than 2 seconds.   Neurological:      Mental Status: He is alert.      Deep Tendon Reflexes: Reflexes abnormal.   Psychiatric:         Mood and Affect: Mood normal.

## 2024-12-27 NOTE — PATIENT INSTRUCTIONS
Overall seems to be doing well but does continue to have a lot of trouble with the joint pain.  Try to watch overall activity.  Try to watch the sweets and starch in the diet as the A1c is higher at 7.6.  Continue baseline meds

## 2025-01-21 ENCOUNTER — TELEPHONE (OUTPATIENT)
Age: 85
End: 2025-01-21

## 2025-02-28 ENCOUNTER — TELEPHONE (OUTPATIENT)
Age: 85
End: 2025-02-28

## 2025-02-28 DIAGNOSIS — N17.9 AKI (ACUTE KIDNEY INJURY) (HCC): ICD-10-CM

## 2025-02-28 DIAGNOSIS — E03.9 ACQUIRED HYPOTHYROIDISM: Chronic | ICD-10-CM

## 2025-02-28 DIAGNOSIS — I10 ESSENTIAL HYPERTENSION: Primary | Chronic | ICD-10-CM

## 2025-02-28 DIAGNOSIS — N18.32 STAGE 3B CHRONIC KIDNEY DISEASE (HCC): ICD-10-CM

## 2025-03-03 ENCOUNTER — OFFICE VISIT (OUTPATIENT)
Dept: NEPHROLOGY | Facility: CLINIC | Age: 85
End: 2025-03-03
Payer: MEDICARE

## 2025-03-03 VITALS
BODY MASS INDEX: 30.33 KG/M2 | WEIGHT: 164.8 LBS | DIASTOLIC BLOOD PRESSURE: 85 MMHG | OXYGEN SATURATION: 96 % | SYSTOLIC BLOOD PRESSURE: 127 MMHG | RESPIRATION RATE: 16 BRPM | HEART RATE: 59 BPM | TEMPERATURE: 98.1 F | HEIGHT: 62 IN

## 2025-03-03 DIAGNOSIS — E78.2 MIXED HYPERLIPIDEMIA: ICD-10-CM

## 2025-03-03 DIAGNOSIS — R73.01 IMPAIRED FASTING BLOOD SUGAR: ICD-10-CM

## 2025-03-03 DIAGNOSIS — I10 ESSENTIAL HYPERTENSION: Chronic | ICD-10-CM

## 2025-03-03 DIAGNOSIS — N18.2 STAGE 2 CHRONIC KIDNEY DISEASE: Primary | ICD-10-CM

## 2025-03-03 PROCEDURE — G2211 COMPLEX E/M VISIT ADD ON: HCPCS | Performed by: INTERNAL MEDICINE

## 2025-03-03 PROCEDURE — 99204 OFFICE O/P NEW MOD 45 MIN: CPT | Performed by: INTERNAL MEDICINE

## 2025-03-03 NOTE — PROGRESS NOTES
[facility]  Bj Pedersen DO    Name: Felix Ochoa  YOB: 1940      Assessment/Plan:    Stage 2 chronic kidney disease  Lab Results   Component Value Date    EGFR 64 01/06/2024    EGFR 61.4 12/19/2023    EGFR 58.67 12/19/2023    CREATININE 1.07 01/06/2024    CREATININE 1.19 12/19/2023    CREATININE 1.16 12/03/2022     Patient baseline creatinine likely right around 1.1 mg/dL, estimate GFR is greater than 60 mL/min.  Although the patient is smaller stature than typical man in GFR potentially overestimating true kidney function, he also has increased muscle mass compared to average 82-year-old male, which will also underestimate true kidney function.  At this time given overall clinical circumstances, I do not believe a 24-hour urine for creatinine clearance is necessary or indicated, we will simply monitor creatinine and go from there.    For completeness sake, we will check a kidney ultrasound to confirm appropriate renal anatomy.    Impaired fasting blood sugar  Patient has significantly reduced carbohydrate intake, will be due for a repeat A1c in the near future which we will defer to primary care provider.  Check urine studies to confirm no significant proteinuria.  If present, additional studies will be needed.    Essential hypertension  Continue to encourage low-sodium diet, continue with amlodipine 5 mg daily.  Patient's blood pressures are acceptable at this time.  Although the patient's diastolic blood pressure of 85 mmHg is slightly above goal, we will simply continue to monitor for now.         Problem List Items Addressed This Visit          Cardiovascular and Mediastinum    Essential hypertension (Chronic)    Continue to encourage low-sodium diet, continue with amlodipine 5 mg daily.  Patient's blood pressures are acceptable at this time.  Although the patient's diastolic blood pressure of 85 mmHg is slightly above goal, we will simply  continue to monitor for now.            Endocrine    Impaired fasting blood sugar    Patient has significantly reduced carbohydrate intake, will be due for a repeat A1c in the near future which we will defer to primary care provider.  Check urine studies to confirm no significant proteinuria.  If present, additional studies will be needed.            Genitourinary    Stage 2 chronic kidney disease - Primary    Lab Results   Component Value Date    EGFR 64 01/06/2024    EGFR 61.4 12/19/2023    EGFR 58.67 12/19/2023    CREATININE 1.07 01/06/2024    CREATININE 1.19 12/19/2023    CREATININE 1.16 12/03/2022     Patient baseline creatinine likely right around 1.1 mg/dL, estimate GFR is greater than 60 mL/min.  Although the patient is smaller stature than typical man in GFR potentially overestimating true kidney function, he also has increased muscle mass compared to average 82-year-old male, which will also underestimate true kidney function.  At this time given overall clinical circumstances, I do not believe a 24-hour urine for creatinine clearance is necessary or indicated, we will simply monitor creatinine and go from there.    For completeness sake, we will check a kidney ultrasound to confirm appropriate renal anatomy.         Relevant Orders    US kidney and bladder     Other Visit Diagnoses         Mixed hyperlipidemia        Relevant Orders    Lipid panel            Thank you for allowing us to participate in the care of your patient.  We confirmed that although estimate GFR is slightly reduced, it is nearly at his expected levels given age and gender.  Will check a kidney ultrasound to confirm appropriate anatomy, and look to see him back in September or October 2025 to review labs and imaging.  Expect to monitor the patient on a yearly basis after that.    Subjective:      Patient ID: Felix Ochoa is a 84 y.o. male.    Patient presents for follow up.    We reviewed labs in detail, most recent creatinine  noted to be 1.07 mg/dL with an eGFR 64 ml/min.    There were no significant electrolyte abnormalities noted.  Patient is taking all medications as prescribed with no specific side effects and denies use of nonsteroid anti-inflammatory medications.              The following portions of the patient's history were reviewed and updated as appropriate: allergies, current medications, past family history, past medical history, past social history, past surgical history and problem list.    Review of Systems   All other systems reviewed and are negative.        Social History     Socioeconomic History    Marital status: /Civil Union     Spouse name: None    Number of children: None    Years of education: None    Highest education level: None   Occupational History    Occupation: Repairs fire trucks    Tobacco Use    Smoking status: Never    Smokeless tobacco: Never   Vaping Use    Vaping status: Never Used   Substance and Sexual Activity    Alcohol use: No    Drug use: No    Sexual activity: Not Currently   Other Topics Concern    None   Social History Narrative    None     Social Drivers of Health     Financial Resource Strain: Low Risk  (8/2/2023)    Overall Financial Resource Strain (CARDIA)     Difficulty of Paying Living Expenses: Not hard at all   Food Insecurity: No Food Insecurity (8/29/2024)    Nursing - Inadequate Food Risk Classification     Worried About Running Out of Food in the Last Year: Never true     Ran Out of Food in the Last Year: Never true     Ran Out of Food in the Last Year: Not on file   Transportation Needs: No Transportation Needs (8/29/2024)    PRAPARE - Transportation     Lack of Transportation (Medical): No     Lack of Transportation (Non-Medical): No   Physical Activity: Not on file   Stress: Not on file   Social Connections: Unknown (6/18/2024)    Received from [facility]    Social Connections     How often do you feel lonely or isolated from those around you? (Adult - for ages 18  years and over): Not on file   Intimate Partner Violence: Not on file   Housing Stability: Unknown (8/29/2024)    Housing Stability Vital Sign     Unable to Pay for Housing in the Last Year: No     Number of Times Moved in the Last Year: Not on file     Homeless in the Last Year: No     Past Medical History:   Diagnosis Date    Chronic ischemic heart disease     Coronary artery disease     hx cabg x4 2007    Hearing loss     Hyperlipidemia     Hypertension     Hypokalemia     Hypothyroid     OA (osteoarthritis)     left knee    Prediabetes     S/P AVR     2007    Type 2 diabetes mellitus (HCC)     Wears dentures     full upper    Wears glasses      Past Surgical History:   Procedure Laterality Date    AORTIC VALVE REPLACEMENT      BACK SURGERY      lumbar     CATARACT EXTRACTION Bilateral     CORONARY ARTERY BYPASS GRAFT      x4    2007    KNEE ARTHROSCOPY Bilateral     [place] ARTHRP KNE CONDYLE&PLATU MEDIAL&LAT COMPARTMENTS Left 1/17/2018    Procedure: ARTHROPLASTY KNEE TOTAL;  Surgeon: Ajit Smith MD;  Location: [facility];  Service: Orthopedics    SHOULDER ARTHROSCOPY Right        Current Outpatient Medications:     amLODIPine (NORVASC) 5 mg tablet, Take 1 tablet (5 mg total) by mouth daily, Disp: 90 tablet, Rfl: 1    aspirin (ECOTRIN LOW STRENGTH) 81 mg EC tablet, Take 324 mg by mouth daily , Disp: , Rfl:     clopidogrel (PLAVIX) 75 mg tablet, Take 75 mg by mouth daily, Disp: , Rfl:     levothyroxine 50 mcg tablet, Take 1 tablet (50 mcg total) by mouth daily, Disp: 90 tablet, Rfl: 3    meloxicam (Mobic) 15 mg tablet, Take 1 tablet (15 mg total) by mouth daily, Disp: 15 tablet, Rfl: 3    Multiple Vitamin (MULTIVITAMIN) capsule, Take 1 capsule by mouth daily, Disp: , Rfl:     simvastatin (ZOCOR) 40 mg tablet, Take 1 tablet (40 mg total) by mouth daily at bedtime, Disp: 90 tablet, Rfl: 1    neomycin-polymyxin-hydrocortisone (CORTISPORIN) 0.35%-10,000 units/mL-1% otic suspension, Administer 4 drops into ears 3 (three)  "times a day for 7 days, Disp: 10 mL, Rfl: 0    Lab Results   Component Value Date    SODIUM 137 01/06/2024    K 4.2 01/06/2024     01/06/2024    CO2 27 01/06/2024    AGAP 7 01/06/2024    BUN 15 01/06/2024    CREATININE 1.07 01/06/2024    GLUC 169 (H) 01/06/2024    GLUF 125 (H) 07/09/2021    CALCIUM 9.0 01/06/2024    AST 36 09/17/2024    ALT 34 09/17/2024    ALKPHOS 47 01/06/2024    TP 7.2 01/06/2024    TBILI 0.84 01/06/2024    EGFR 64 01/06/2024     Lab Results   Component Value Date    WBC 8.03 01/06/2024    HGB 15.3 01/06/2024    HCT 43.1 01/06/2024    MCV 87 01/06/2024     01/06/2024     Lab Results   Component Value Date    CHOLESTEROL 166 09/17/2024     Lab Results   Component Value Date    HDL 41 09/17/2024     Lab Results   Component Value Date    LDLCALC 71 09/17/2024     Lab Results   Component Value Date    TRIG 268 (H) 09/17/2024     No results found for: \"CHOLHDL\"  Lab Results   Component Value Date    GIZ3VNTYUCND 5.832 (H) 01/06/2024     Lab Results   Component Value Date    CALCIUM 9.0 01/06/2024    PHOS 2.6 12/03/2022     No results found for: \"SPEP\", \"UPEP\"  No results found for: \"MICROALBUR\", \"BHRN06VOC\"        Objective:      /85 (Patient Position: Sitting, Cuff Size: Standard)   Pulse 59   Temp 98.1 °F (36.7 °C) (Temporal)   Resp 16   Ht 5' 2\" (1.575 m)   Wt 74.8 kg (164 lb 12.8 oz)   SpO2 96%   BMI 30.14 kg/m²          Physical Exam  Vitals reviewed.   Constitutional:       General: He is not in acute distress.     Appearance: Normal appearance.   HENT:      Head: Normocephalic and atraumatic.      Right Ear: External ear normal.      Left Ear: External ear normal.   Eyes:      Conjunctiva/sclera: Conjunctivae normal.   Cardiovascular:      Rate and Rhythm: Normal rate and regular rhythm.      Heart sounds: Normal heart sounds.      Comments: Soft systolic murmur  Pulmonary:      Effort: No respiratory distress.      Breath sounds: No wheezing.   Abdominal:      " Palpations: Abdomen is soft.   Skin:     General: Skin is warm and dry.   Neurological:      General: No focal deficit present.      Mental Status: He is alert and oriented to person, place, and time.   Psychiatric:         Mood and Affect: Mood normal.         Behavior: Behavior normal.

## 2025-03-03 NOTE — ASSESSMENT & PLAN NOTE
Continue to encourage low-sodium diet, continue with amlodipine 5 mg daily.  Patient's blood pressures are acceptable at this time.  Although the patient's diastolic blood pressure of 85 mmHg is slightly above goal, we will simply continue to monitor for now.

## 2025-03-03 NOTE — ASSESSMENT & PLAN NOTE
Patient has significantly reduced carbohydrate intake, will be due for a repeat A1c in the near future which we will defer to primary care provider.  Check urine studies to confirm no significant proteinuria.  If present, additional studies will be needed.

## 2025-03-03 NOTE — ASSESSMENT & PLAN NOTE
Lab Results   Component Value Date    EGFR 64 01/06/2024    EGFR 61.4 12/19/2023    EGFR 58.67 12/19/2023    CREATININE 1.07 01/06/2024    CREATININE 1.19 12/19/2023    CREATININE 1.16 12/03/2022     Patient baseline creatinine likely right around 1.1 mg/dL, estimate GFR is greater than 60 mL/min.  Although the patient is smaller stature than typical man in GFR potentially overestimating true kidney function, he also has increased muscle mass compared to average 82-year-old male, which will also underestimate true kidney function.  At this time given overall clinical circumstances, I do not believe a 24-hour urine for creatinine clearance is necessary or indicated, we will simply monitor creatinine and go from there.    For completeness sake, we will check a kidney ultrasound to confirm appropriate renal anatomy.

## 2025-03-07 ENCOUNTER — RESULTS FOLLOW-UP (OUTPATIENT)
Age: 85
End: 2025-03-07

## 2025-03-07 ENCOUNTER — HOSPITAL ENCOUNTER (OUTPATIENT)
Dept: ULTRASOUND IMAGING | Facility: HOSPITAL | Age: 85
End: 2025-03-07
Attending: INTERNAL MEDICINE
Payer: MEDICARE

## 2025-03-07 ENCOUNTER — APPOINTMENT (OUTPATIENT)
Dept: LAB | Facility: HOSPITAL | Age: 85
End: 2025-03-07
Payer: MEDICARE

## 2025-03-07 DIAGNOSIS — N18.2 STAGE 2 CHRONIC KIDNEY DISEASE: ICD-10-CM

## 2025-03-07 DIAGNOSIS — I10 ESSENTIAL HYPERTENSION: Chronic | ICD-10-CM

## 2025-03-07 DIAGNOSIS — N17.9 AKI (ACUTE KIDNEY INJURY) (HCC): ICD-10-CM

## 2025-03-07 DIAGNOSIS — N18.32 STAGE 3B CHRONIC KIDNEY DISEASE (HCC): ICD-10-CM

## 2025-03-07 DIAGNOSIS — E03.9 ACQUIRED HYPOTHYROIDISM: Chronic | ICD-10-CM

## 2025-03-07 DIAGNOSIS — E78.2 MIXED HYPERLIPIDEMIA: ICD-10-CM

## 2025-03-07 LAB
ANION GAP SERPL CALCULATED.3IONS-SCNC: 8 MMOL/L (ref 4–13)
BACTERIA UR QL AUTO: ABNORMAL /HPF
BASOPHILS # BLD AUTO: 0.03 THOUSANDS/ÂΜL (ref 0–0.1)
BASOPHILS NFR BLD AUTO: 0 % (ref 0–1)
BILIRUB UR QL STRIP: NEGATIVE
BUN SERPL-MCNC: 20 MG/DL (ref 5–25)
CALCIUM SERPL-MCNC: 9.3 MG/DL (ref 8.4–10.2)
CHLORIDE SERPL-SCNC: 101 MMOL/L (ref 96–108)
CHOLEST SERPL-MCNC: 163 MG/DL (ref ?–200)
CLARITY UR: CLEAR
CO2 SERPL-SCNC: 26 MMOL/L (ref 21–32)
COLOR UR: YELLOW
CREAT SERPL-MCNC: 1.3 MG/DL (ref 0.6–1.3)
CREAT UR-MCNC: 226.8 MG/DL
CREAT UR-MCNC: 230.8 MG/DL
EOSINOPHIL # BLD AUTO: 0.09 THOUSAND/ÂΜL (ref 0–0.61)
EOSINOPHIL NFR BLD AUTO: 1 % (ref 0–6)
ERYTHROCYTE [DISTWIDTH] IN BLOOD BY AUTOMATED COUNT: 13.2 % (ref 11.6–15.1)
GFR SERPL CREATININE-BSD FRML MDRD: 50 ML/MIN/1.73SQ M
GLUCOSE P FAST SERPL-MCNC: 134 MG/DL (ref 65–99)
GLUCOSE UR STRIP-MCNC: ABNORMAL MG/DL
HCT VFR BLD AUTO: 46 % (ref 36.5–49.3)
HDLC SERPL-MCNC: 42 MG/DL
HGB BLD-MCNC: 16 G/DL (ref 12–17)
HGB UR QL STRIP.AUTO: NEGATIVE
HYALINE CASTS #/AREA URNS LPF: ABNORMAL /LPF
IMM GRANULOCYTES # BLD AUTO: 0.04 THOUSAND/UL (ref 0–0.2)
IMM GRANULOCYTES NFR BLD AUTO: 1 % (ref 0–2)
KETONES UR STRIP-MCNC: NEGATIVE MG/DL
LDLC SERPL CALC-MCNC: 62 MG/DL (ref 0–100)
LEUKOCYTE ESTERASE UR QL STRIP: NEGATIVE
LYMPHOCYTES # BLD AUTO: 2.93 THOUSANDS/ÂΜL (ref 0.6–4.47)
LYMPHOCYTES NFR BLD AUTO: 35 % (ref 14–44)
MAGNESIUM SERPL-MCNC: 1.5 MG/DL (ref 1.9–2.7)
MCH RBC QN AUTO: 30.1 PG (ref 26.8–34.3)
MCHC RBC AUTO-ENTMCNC: 34.8 G/DL (ref 31.4–37.4)
MCV RBC AUTO: 87 FL (ref 82–98)
MICROALBUMIN UR-MCNC: 43.6 MG/L
MICROALBUMIN/CREAT 24H UR: 19 MG/G CREATININE (ref 0–30)
MONOCYTES # BLD AUTO: 0.8 THOUSAND/ÂΜL (ref 0.17–1.22)
MONOCYTES NFR BLD AUTO: 10 % (ref 4–12)
MUCOUS THREADS UR QL AUTO: ABNORMAL
NEUTROPHILS # BLD AUTO: 4.4 THOUSANDS/ÂΜL (ref 1.85–7.62)
NEUTS SEG NFR BLD AUTO: 53 % (ref 43–75)
NITRITE UR QL STRIP: NEGATIVE
NON-SQ EPI CELLS URNS QL MICRO: ABNORMAL /HPF
NONHDLC SERPL-MCNC: 121 MG/DL
NRBC BLD AUTO-RTO: 0 /100 WBCS
PH UR STRIP.AUTO: 6 [PH]
PHOSPHATE SERPL-MCNC: 3.4 MG/DL (ref 2.3–4.1)
PLATELET # BLD AUTO: 172 THOUSANDS/UL (ref 149–390)
PMV BLD AUTO: 10.8 FL (ref 8.9–12.7)
POTASSIUM SERPL-SCNC: 4.3 MMOL/L (ref 3.5–5.3)
PROT UR STRIP-MCNC: NEGATIVE MG/DL
PROT UR-MCNC: 18.4 MG/DL
PROT/CREAT UR: 0.1 MG/G{CREAT} (ref 0–0.1)
PTH-INTACT SERPL-MCNC: 57 PG/ML (ref 12–88)
RBC # BLD AUTO: 5.31 MILLION/UL (ref 3.88–5.62)
RBC #/AREA URNS AUTO: ABNORMAL /HPF
SODIUM SERPL-SCNC: 135 MMOL/L (ref 135–147)
SP GR UR STRIP.AUTO: >=1.03 (ref 1–1.03)
TRIGL SERPL-MCNC: 294 MG/DL (ref ?–150)
UROBILINOGEN UR QL STRIP.AUTO: 0.2 E.U./DL
WBC # BLD AUTO: 8.29 THOUSAND/UL (ref 4.31–10.16)
WBC #/AREA URNS AUTO: ABNORMAL /HPF

## 2025-03-07 PROCEDURE — 82043 UR ALBUMIN QUANTITATIVE: CPT

## 2025-03-07 PROCEDURE — 84100 ASSAY OF PHOSPHORUS: CPT

## 2025-03-07 PROCEDURE — 81001 URINALYSIS AUTO W/SCOPE: CPT

## 2025-03-07 PROCEDURE — 76775 US EXAM ABDO BACK WALL LIM: CPT

## 2025-03-07 PROCEDURE — 85025 COMPLETE CBC W/AUTO DIFF WBC: CPT

## 2025-03-07 PROCEDURE — 82570 ASSAY OF URINE CREATININE: CPT

## 2025-03-07 PROCEDURE — 84156 ASSAY OF PROTEIN URINE: CPT

## 2025-03-07 PROCEDURE — 80048 BASIC METABOLIC PNL TOTAL CA: CPT

## 2025-03-07 PROCEDURE — 80061 LIPID PANEL: CPT

## 2025-03-07 PROCEDURE — 83735 ASSAY OF MAGNESIUM: CPT

## 2025-03-07 PROCEDURE — 83970 ASSAY OF PARATHORMONE: CPT

## 2025-03-07 PROCEDURE — 36415 COLL VENOUS BLD VENIPUNCTURE: CPT

## 2025-03-07 NOTE — TELEPHONE ENCOUNTER
I called and left a VM for Felix to call the office back to discuss the following:      ----- Message from Bj Pedersen, DO sent at 3/7/2025 10:49 AM EST -----  Labs reviewed, kidney function is a little lower than usual, and his magnesium level is reduced.  Recommend starting magnesium supplements such as magnesium glycinate 200 mg or magnesium chloride 64 mg 1 tablet daily for now.  Main side effect can be   upset stomach/loose stools, but hopefully should be out of tolerate this.    From the kidney standpoint, do not recommend any labs at this moment, would simply continue to monitor

## 2025-03-10 NOTE — RESULT ENCOUNTER NOTE
I called and left a VM for Felix regarding contacting the office to discuss recent lab results and provider recommendations.

## 2025-03-10 NOTE — TELEPHONE ENCOUNTER
Patients wife called back, relayed the above message and she expressed understanding.  No further action needed

## 2025-04-30 RX ORDER — HYDROCODONE BITARTRATE AND ACETAMINOPHEN 5; 325 MG/1; MG/1
TABLET ORAL
COMMUNITY
Start: 2025-04-24

## 2025-05-02 ENCOUNTER — OFFICE VISIT (OUTPATIENT)
Dept: FAMILY MEDICINE CLINIC | Facility: CLINIC | Age: 85
End: 2025-05-02
Payer: MEDICARE

## 2025-05-02 VITALS
DIASTOLIC BLOOD PRESSURE: 70 MMHG | WEIGHT: 164.4 LBS | SYSTOLIC BLOOD PRESSURE: 126 MMHG | BODY MASS INDEX: 30.25 KG/M2 | HEIGHT: 62 IN

## 2025-05-02 DIAGNOSIS — E03.9 ACQUIRED HYPOTHYROIDISM: ICD-10-CM

## 2025-05-02 DIAGNOSIS — E11.9 TYPE 2 DIABETES MELLITUS WITHOUT COMPLICATION, WITHOUT LONG-TERM CURRENT USE OF INSULIN (HCC): Primary | ICD-10-CM

## 2025-05-02 DIAGNOSIS — I10 ESSENTIAL HYPERTENSION: Chronic | ICD-10-CM

## 2025-05-02 DIAGNOSIS — E78.00 HYPERCHOLESTEROLEMIA: ICD-10-CM

## 2025-05-02 DIAGNOSIS — M15.0 PRIMARY OSTEOARTHRITIS INVOLVING MULTIPLE JOINTS: Chronic | ICD-10-CM

## 2025-05-02 DIAGNOSIS — I25.810 CORONARY ARTERY DISEASE INVOLVING CORONARY BYPASS GRAFT OF NATIVE HEART WITHOUT ANGINA PECTORIS: Chronic | ICD-10-CM

## 2025-05-02 PROCEDURE — 99214 OFFICE O/P EST MOD 30 MIN: CPT | Performed by: FAMILY MEDICINE

## 2025-05-02 PROCEDURE — G2211 COMPLEX E/M VISIT ADD ON: HCPCS | Performed by: FAMILY MEDICINE

## 2025-05-02 RX ORDER — LEVOTHYROXINE SODIUM 50 UG/1
50 TABLET ORAL DAILY
Qty: 90 TABLET | Refills: 3 | Status: SHIPPED | OUTPATIENT
Start: 2025-05-02

## 2025-05-02 RX ORDER — SIMVASTATIN 40 MG
40 TABLET ORAL
Qty: 90 TABLET | Refills: 1 | Status: SHIPPED | OUTPATIENT
Start: 2025-05-02

## 2025-05-02 NOTE — ASSESSMENT & PLAN NOTE
Showing better control.  Continue to watch sweets in diet and try to push daily walking activity  Lab Results   Component Value Date    HGBA1C 7.6 (A) 12/27/2024

## 2025-05-02 NOTE — PATIENT INSTRUCTIONS
Overall seems to be doing well but is recovering from the right forearm surgery and still has cast on.  A1c is much better at 6.7.  Continue to watch the sweets in the diet and push daily walking activity.  Continue current meds and follow-up

## 2025-05-02 NOTE — ASSESSMENT & PLAN NOTE
Is still recovering from the right wrist surgery should continue follow-up.  Continue current meds and push activity as tolerated

## 2025-05-02 NOTE — PROGRESS NOTES
Name: Felix Ochoa      : 1940      MRN: [id number]  Encounter Provider: Milad Payne MD  Encounter Date: 2025   Encounter department: [facility]    Assessment & Plan  Acquired hypothyroidism  Overall stable, continue current regimen  Orders:  •  levothyroxine 50 mcg tablet; Take 1 tablet (50 mcg total) by mouth daily    Hypercholesterolemia  Stable, continue current regimen  Orders:  •  simvastatin (ZOCOR) 40 mg tablet; Take 1 tablet (40 mg total) by mouth daily at bedtime    Type 2 diabetes mellitus without complication, without long-term current use of insulin (HCC)  Showing better control.  Continue to watch sweets in diet and try to push daily walking activity  Lab Results   Component Value Date    HGBA1C 7.6 (A) 2024            Coronary artery disease involving coronary bypass graft of native heart without angina pectoris  Overall seems stable.  Continue current regimen and follow-up.  Push activity as tolerated       Essential hypertension  Stable, continue current regimen       Primary osteoarthritis involving multiple joints  Is still recovering from the right wrist surgery should continue follow-up.  Continue current meds and push activity as tolerated         Falls Plan of Care: balance, strength, and gait training instructions were provided. Overall balance is good.  Continue to push walking activity to maintain leg strength        History of Present Illness       Patient has history of hypertension, hypothyroidism, coronary artery disease, diabetes mellitus adult and degenerative arthritis.  Was having a lot of trouble with right wrist pain and had surgery to fix the right wrist.  Is in cast at this time      Review of Systems   Constitutional:  Positive for activity change (Has been less active). Negative for appetite change, chills, fatigue, fever and unexpected weight change.   HENT:  Negative for congestion and trouble swallowing.     Eyes: Negative.  Negative for visual disturbance.   Respiratory:  Negative for apnea, cough, chest tightness and shortness of breath.    Cardiovascular:  Negative for chest pain, palpitations and leg swelling.   Gastrointestinal:  Positive for constipation. Negative for abdominal pain and diarrhea.   Endocrine: Negative for polyuria (Nocturia x 1).   Genitourinary:  Positive for difficulty urinating (Does have slow stream). Negative for hematuria.   Musculoskeletal:  Positive for arthralgias (Right wrist area still sore postsurgery), back pain and gait problem (Because of knee pain). Negative for myalgias.   Skin:  Negative for color change and rash.   Allergic/Immunologic: Negative for environmental allergies.   Neurological:  Positive for dizziness (Occasional) and light-headedness (Occasional). Negative for weakness, numbness and headaches.   Hematological:  Negative for adenopathy.   Psychiatric/Behavioral:  Positive for sleep disturbance (Because of the right wrist pain). Negative for agitation.    All other systems reviewed and are negative.    Past Medical History:   Diagnosis Date   • Chronic ischemic heart disease    • Coronary artery disease     hx cabg x4 2007   • Hearing loss    • Hyperlipidemia    • Hypertension    • Hypokalemia    • Hypothyroid    • OA (osteoarthritis)     left knee   • Prediabetes    • S/P AVR     2007   • Type 2 diabetes mellitus (HCC)    • Wears dentures     full upper   • Wears glasses      Past Surgical History:   Procedure Laterality Date   • AORTIC VALVE REPLACEMENT     • BACK SURGERY      lumbar    • CATARACT EXTRACTION Bilateral    • CORONARY ARTERY BYPASS GRAFT      x4    2007   • KNEE ARTHROSCOPY Bilateral    • GA ARTHRP KNE CONDYLE&PLATU MEDIAL&LAT COMPARTMENTS Left 1/17/2018    Procedure: ARTHROPLASTY KNEE TOTAL;  Surgeon: Ajit Smith MD;  Location: [facility];  Service: Orthopedics   • SHOULDER ARTHROSCOPY Right      Family History   Problem Relation Age of Onset   •  "Cancer Mother      Social History     Tobacco Use   • Smoking status: Never   • Smokeless tobacco: Never   Vaping Use   • Vaping status: Never Used   Substance and Sexual Activity   • Alcohol use: No   • Drug use: No   • Sexual activity: Not Currently     Current Outpatient Medications on File Prior to Visit   Medication Sig   • amLODIPine (NORVASC) 5 mg tablet Take 1 tablet (5 mg total) by mouth daily   • aspirin (ECOTRIN LOW STRENGTH) 81 mg EC tablet Take 324 mg by mouth daily    • HYDROcodone-acetaminophen (NORCO) 5-325 mg per tablet 1 tablets every 4-6 hours prn pain   • meloxicam (Mobic) 15 mg tablet Take 1 tablet (15 mg total) by mouth daily   • Multiple Vitamin (MULTIVITAMIN) capsule Take 1 capsule by mouth daily   • [DISCONTINUED] clopidogrel (PLAVIX) 75 mg tablet Take 75 mg by mouth daily   • [DISCONTINUED] levothyroxine 50 mcg tablet Take 1 tablet (50 mcg total) by mouth daily   • [DISCONTINUED] simvastatin (ZOCOR) 40 mg tablet Take 1 tablet (40 mg total) by mouth daily at bedtime   • neomycin-polymyxin-hydrocortisone (CORTISPORIN) 0.35%-10,000 units/mL-1% otic suspension Administer 4 drops into ears 3 (three) times a day for 7 days     No Known Allergies  Immunization History   Administered Date(s) Administered   • COVID-19 MODERNA VACC 0.5 ML IM 01/28/2021, 02/25/2021, 11/26/2021   • INFLUENZA 12/18/2007, 09/11/2008, 09/08/2009, 09/08/2010, 09/26/2011, 10/10/2012, 11/12/2013, 12/10/2014, 09/22/2015, 10/04/2016, 11/01/2017, 11/06/2018   • Influenza Split High Dose Preservative Free IM 10/16/2024   • Influenza, high dose seasonal 0.7 mL 10/28/2019, 10/27/2020, 11/23/2021, 10/10/2022, 12/04/2023   • Influenza, seasonal, injectable 12/18/2007   • Pneumococcal Conjugate 13-Valent 05/02/2018   • Pneumococcal Polysaccharide PPV23 12/18/2007, 05/02/2018   • Tdap 07/08/2021     Objective   Blood Pressure 126/70 (BP Location: Left arm, Patient Position: Sitting, Cuff Size: Standard)   Height 5' 2\" (1.575 m)   " Weight 74.6 kg (164 lb 6.4 oz)   Body Mass Index 30.07 kg/m²     Physical Exam  Vitals and nursing note reviewed.   Constitutional:       General: He is not in acute distress.     Appearance: Normal appearance. He is well-developed.   HENT:      Head: Normocephalic and atraumatic.      Nose: Nose normal.   Eyes:      Conjunctiva/sclera: Conjunctivae normal.   Neck:      Vascular: No carotid bruit.   Cardiovascular:      Rate and Rhythm: Normal rate and regular rhythm.      Heart sounds: Murmur (Soft systolic murmur at left sternal border.  Heart rate is 66) heard.   Pulmonary:      Effort: Pulmonary effort is normal. No respiratory distress.      Breath sounds: Normal breath sounds.   Abdominal:      General: Abdomen is flat.      Palpations: Abdomen is soft. There is no mass.      Tenderness: There is no abdominal tenderness.   Musculoskeletal:         General: No swelling.      Cervical back: Neck supple. No tenderness.   Lymphadenopathy:      Cervical: No cervical adenopathy.   Skin:     General: Skin is warm and dry.      Capillary Refill: Capillary refill takes less than 2 seconds.      Findings: No rash.   Neurological:      Mental Status: He is alert.      Motor: No weakness.      Coordination: Coordination normal.      Gait: Gait abnormal.      Deep Tendon Reflexes: Reflexes abnormal (Reflexes diminished).   Psychiatric:         Mood and Affect: Mood normal.

## 2025-05-02 NOTE — ASSESSMENT & PLAN NOTE
Overall stable, continue current regimen  Orders:  •  levothyroxine 50 mcg tablet; Take 1 tablet (50 mcg total) by mouth daily

## (undated) DEVICE — INTENDED FOR TISSUE SEPARATION, AND OTHER PROCEDURES THAT REQUIRE A SHARP SURGICAL BLADE TO PUNCTURE OR CUT.: Brand: BARD-PARKER ® CARBON RIB-BACK BLADES

## (undated) DEVICE — FRAZIER SUCTION INSTRUMENT 18 FR W/OBTURATOR, NO CONTROL VENT: Brand: FRAZIER

## (undated) DEVICE — PREP SURGICAL PURPREP 26ML

## (undated) DEVICE — SAW BLADE OSCILLATING BRAZOL 167

## (undated) DEVICE — CAPIT KNEE ATTUNE RP CEMENT - DEPUY

## (undated) DEVICE — PACK TOTAL KNEE PBDS

## (undated) DEVICE — HEX-LOCKING BLADE ELECTRODE: Brand: EDGE

## (undated) DEVICE — 3M™ STERI-DRAPE™ U-DRAPE 1015: Brand: STERI-DRAPE™

## (undated) DEVICE — SUT VICRYL 2-0 CT-1 36 IN J945H

## (undated) DEVICE — SUT VICRYL 1 CTX 36 IN J977H

## (undated) DEVICE — COOL TEMP PAD

## (undated) DEVICE — THE SIMPULSE SOLO SYSTEM WITH ULTREX RETRACTABLE SPLASH SHIELD TIP: Brand: SIMPULSE SOLO

## (undated) DEVICE — CEMENT MIXING BOWL W/SPATULA

## (undated) DEVICE — GLOVE SRG BIOGEL 6.5

## (undated) DEVICE — SUT MONOCRYL 3-0 PS-2 27 IN Y427H

## (undated) DEVICE — HOOD: Brand: FLYTE

## (undated) DEVICE — CURETTE QUIK-USE F/ BN PREP

## (undated) DEVICE — 3M™ STERI-STRIP™ REINFORCED ADHESIVE SKIN CLOSURES, R1547, 1/2 IN X 4 IN (12 MM X 100 MM), 6 STRIPS/ENVELOPE: Brand: 3M™ STERI-STRIP™

## (undated) DEVICE — Device

## (undated) DEVICE — SCD SEQUENTIAL COMPRESSION COMFORT SLEEVE MEDIUM KNEE LENGTH: Brand: KENDALL SCD

## (undated) DEVICE — 3M™ IOBAN™ 2 ANTIMICROBIAL INCISE DRAPE 6648EZ: Brand: IOBAN™ 2

## (undated) DEVICE — WEBRIL 6 IN UNSTERILE

## (undated) DEVICE — GLOVE INDICATOR PI UNDERGLOVE SZ 7 BLUE

## (undated) DEVICE — SAW BLADE RECIPROCATING 179

## (undated) DEVICE — CHLORAPREP HI-LITE 26ML ORANGE

## (undated) DEVICE — 3000CC GUARDIAN II: Brand: GUARDIAN

## (undated) DEVICE — COBAN 6 IN STERILE

## (undated) DEVICE — IMPERVIOUS STOCKINETTE: Brand: DEROYAL

## (undated) DEVICE — GLOVE INDICATOR PI UNDERGLOVE SZ 8 BLUE

## (undated) DEVICE — CUFF TOURNIQUET 30 X 4 IN QUICK CONNECT DISP 1BLA

## (undated) DEVICE — SURETRANS AUTOTRANSFUSION SYSTEM FOR ORTHOPAEDICS WITH PVC DRAIN AND 2 TROCARS: Brand: SURETRANS AUTOTRANSFUSION SYSTEM FOR ORTHOPAEDICS

## (undated) DEVICE — GLOVE SRG BIOGEL 8

## (undated) DEVICE — PADDING CAST 6IN COTTON STRL

## (undated) DEVICE — SKIN MARKER DUAL TIP WITH RULER CAP, FLEXIBLE RULER AND LABELS: Brand: DEVON

## (undated) DEVICE — OCCLUSIVE GAUZE STRIP,3% BISMUTH TRIBROMOPHENATE IN PETROLATUM BLEND: Brand: XEROFORM

## (undated) DEVICE — 3M™ DURAPORE™ SURGICAL TAPE 1538-3, 3 INCH X 10 YARD (7,5CM X 9,1M), 4 ROLLS/BOX: Brand: 3M™ DURAPORE™